# Patient Record
Sex: MALE | Race: OTHER | HISPANIC OR LATINO | URBAN - METROPOLITAN AREA
[De-identification: names, ages, dates, MRNs, and addresses within clinical notes are randomized per-mention and may not be internally consistent; named-entity substitution may affect disease eponyms.]

---

## 2019-12-20 ENCOUNTER — INPATIENT (INPATIENT)
Facility: HOSPITAL | Age: 69
LOS: 3 days | Discharge: HOME HEALTH | End: 2019-12-24
Attending: INTERNAL MEDICINE | Admitting: INTERNAL MEDICINE
Payer: MEDICARE

## 2019-12-20 VITALS
DIASTOLIC BLOOD PRESSURE: 71 MMHG | SYSTOLIC BLOOD PRESSURE: 121 MMHG | RESPIRATION RATE: 20 BRPM | TEMPERATURE: 98 F | OXYGEN SATURATION: 97 % | HEART RATE: 93 BPM

## 2019-12-20 LAB
ALBUMIN SERPL ELPH-MCNC: 4.1 G/DL — SIGNIFICANT CHANGE UP (ref 3.5–5.2)
ALP SERPL-CCNC: 714 U/L — HIGH (ref 30–115)
ALT FLD-CCNC: 134 U/L — HIGH (ref 0–41)
ANION GAP SERPL CALC-SCNC: 22 MMOL/L — HIGH (ref 7–14)
APPEARANCE UR: CLEAR — SIGNIFICANT CHANGE UP
APTT BLD: 31.1 SEC — SIGNIFICANT CHANGE UP (ref 27–39.2)
AST SERPL-CCNC: 83 U/L — HIGH (ref 0–41)
BACTERIA # UR AUTO: NEGATIVE — SIGNIFICANT CHANGE UP
BASE EXCESS BLDV CALC-SCNC: 1.3 MMOL/L — SIGNIFICANT CHANGE UP (ref -2–2)
BILIRUB SERPL-MCNC: 7 MG/DL — HIGH (ref 0.2–1.2)
BILIRUB UR-MCNC: ABNORMAL
BUN SERPL-MCNC: 92 MG/DL — CRITICAL HIGH (ref 10–20)
CA-I SERPL-SCNC: 1.05 MMOL/L — LOW (ref 1.12–1.3)
CALCIUM SERPL-MCNC: 8.8 MG/DL — SIGNIFICANT CHANGE UP (ref 8.5–10.1)
CHLORIDE SERPL-SCNC: 84 MMOL/L — LOW (ref 98–110)
CO2 SERPL-SCNC: 24 MMOL/L — SIGNIFICANT CHANGE UP (ref 17–32)
COLOR SPEC: ABNORMAL
CREAT SERPL-MCNC: 9.5 MG/DL — CRITICAL HIGH (ref 0.7–1.5)
DIFF PNL FLD: SIGNIFICANT CHANGE UP
EPI CELLS # UR: 2 /HPF — SIGNIFICANT CHANGE UP (ref 0–5)
FLU A RESULT: NEGATIVE — SIGNIFICANT CHANGE UP
FLU A RESULT: NEGATIVE — SIGNIFICANT CHANGE UP
FLUAV AG NPH QL: NEGATIVE — SIGNIFICANT CHANGE UP
FLUBV AG NPH QL: NEGATIVE — SIGNIFICANT CHANGE UP
GAS PNL BLDV: 130 MMOL/L — LOW (ref 136–145)
GAS PNL BLDV: SIGNIFICANT CHANGE UP
GLUCOSE SERPL-MCNC: 125 MG/DL — HIGH (ref 70–99)
GLUCOSE UR QL: NEGATIVE — SIGNIFICANT CHANGE UP
HCO3 BLDV-SCNC: 28 MMOL/L — SIGNIFICANT CHANGE UP (ref 22–29)
HCT VFR BLD CALC: 33.9 % — LOW (ref 42–52)
HCT VFR BLDA CALC: 36.1 % — SIGNIFICANT CHANGE UP (ref 34–44)
HGB BLD CALC-MCNC: 11.8 G/DL — LOW (ref 14–18)
HGB BLD-MCNC: 10.9 G/DL — LOW (ref 14–18)
HYALINE CASTS # UR AUTO: 3 /LPF — SIGNIFICANT CHANGE UP (ref 0–7)
INR BLD: 1.46 RATIO — HIGH (ref 0.65–1.3)
KETONES UR-MCNC: NEGATIVE — SIGNIFICANT CHANGE UP
LACTATE BLDV-MCNC: 1.9 MMOL/L — HIGH (ref 0.5–1.6)
LEUKOCYTE ESTERASE UR-ACNC: ABNORMAL
MAGNESIUM SERPL-MCNC: 2.3 MG/DL — SIGNIFICANT CHANGE UP (ref 1.8–2.4)
MCHC RBC-ENTMCNC: 28 PG — SIGNIFICANT CHANGE UP (ref 27–31)
MCHC RBC-ENTMCNC: 32.2 G/DL — SIGNIFICANT CHANGE UP (ref 32–37)
MCV RBC AUTO: 87.1 FL — SIGNIFICANT CHANGE UP (ref 80–94)
NITRITE UR-MCNC: NEGATIVE — SIGNIFICANT CHANGE UP
NRBC # BLD: 0 /100 WBCS — SIGNIFICANT CHANGE UP (ref 0–0)
NT-PROBNP SERPL-SCNC: HIGH PG/ML (ref 0–300)
PCO2 BLDV: 50 MMHG — SIGNIFICANT CHANGE UP (ref 41–51)
PH BLDV: 7.35 — SIGNIFICANT CHANGE UP (ref 7.26–7.43)
PH UR: 5.5 — SIGNIFICANT CHANGE UP (ref 5–8)
PLATELET # BLD AUTO: 127 K/UL — LOW (ref 130–400)
PO2 BLDV: 38 MMHG — SIGNIFICANT CHANGE UP (ref 20–40)
POTASSIUM BLDV-SCNC: 5.4 MMOL/L — SIGNIFICANT CHANGE UP (ref 3.3–5.6)
POTASSIUM SERPL-MCNC: 5.4 MMOL/L — HIGH (ref 3.5–5)
POTASSIUM SERPL-SCNC: 5.4 MMOL/L — HIGH (ref 3.5–5)
PROT SERPL-MCNC: 7.4 G/DL — SIGNIFICANT CHANGE UP (ref 6–8)
PROT UR-MCNC: ABNORMAL
PROTHROM AB SERPL-ACNC: 16.7 SEC — HIGH (ref 9.95–12.87)
RBC # BLD: 3.89 M/UL — LOW (ref 4.7–6.1)
RBC # FLD: 16.3 % — HIGH (ref 11.5–14.5)
RBC CASTS # UR COMP ASSIST: 4 /HPF — SIGNIFICANT CHANGE UP (ref 0–4)
RSV RESULT: NEGATIVE — SIGNIFICANT CHANGE UP
RSV RNA RESP QL NAA+PROBE: NEGATIVE — SIGNIFICANT CHANGE UP
SAO2 % BLDV: 63 % — SIGNIFICANT CHANGE UP
SODIUM SERPL-SCNC: 130 MMOL/L — LOW (ref 135–146)
SP GR SPEC: 1.02 — SIGNIFICANT CHANGE UP (ref 1.01–1.02)
TROPONIN T SERPL-MCNC: 0.14 NG/ML — CRITICAL HIGH
UROBILINOGEN FLD QL: ABNORMAL
WBC # BLD: 7.57 K/UL — SIGNIFICANT CHANGE UP (ref 4.8–10.8)
WBC # FLD AUTO: 7.57 K/UL — SIGNIFICANT CHANGE UP (ref 4.8–10.8)
WBC UR QL: 16 /HPF — HIGH (ref 0–5)

## 2019-12-20 PROCEDURE — 99285 EMERGENCY DEPT VISIT HI MDM: CPT

## 2019-12-20 PROCEDURE — 71045 X-RAY EXAM CHEST 1 VIEW: CPT | Mod: 26

## 2019-12-20 PROCEDURE — 93010 ELECTROCARDIOGRAM REPORT: CPT

## 2019-12-20 RX ORDER — MEROPENEM 1 G/30ML
500 INJECTION INTRAVENOUS ONCE
Refills: 0 | Status: COMPLETED | OUTPATIENT
Start: 2019-12-20 | End: 2019-12-20

## 2019-12-20 RX ORDER — FUROSEMIDE 40 MG
40 TABLET ORAL ONCE
Refills: 0 | Status: DISCONTINUED | OUTPATIENT
Start: 2019-12-20 | End: 2019-12-20

## 2019-12-20 RX ORDER — ASPIRIN/CALCIUM CARB/MAGNESIUM 324 MG
324 TABLET ORAL ONCE
Refills: 0 | Status: DISCONTINUED | OUTPATIENT
Start: 2019-12-20 | End: 2019-12-20

## 2019-12-20 NOTE — ED PROVIDER NOTE - PROGRESS NOTE DETAILS
Hyperkalemia - 5.4 without acute EKG changes. patient can wait till Sat for HD. Noted elevated LFTs and jaundice. CT ordered to evaluate intraabdominal pathology. patient also had low grade temp on arrival 99.7 so abx was also initiated.

## 2019-12-20 NOTE — ED PROVIDER NOTE - OBJECTIVE STATEMENT
68 yo male hx of HTN/A.fib/ DM (controlled)/ ESRD on HD (Tue, Thur, Sat last HD Tue) / ? rheumatic heart disease s/p Aortic and Mitral valve replacement present c/o fever and generalized weakness today. as per patient, he had abdominal pain and nausea yesterday so he didn't go to his HD yesterday. Started feeling fever/chill and weakness so he went to outside urgent who sent him to ED for evaluation. daughter also reported patient has been having worsening legs swelling over the past few months and sometimes MERCADO. patient denies chest pain/abd pain/n/v/d and urinary sxs (made small amount of urine daily).

## 2019-12-20 NOTE — ED ADULT TRIAGE NOTE - CHIEF COMPLAINT QUOTE
Patient BIBEMS from urgent care. Patient complains of weakness/fatigue x 2 weeks. Patient skipped dialysis yesterday due to weakness. Patient felt SOB at urgent care. Patient on 2L O2 PTA.

## 2019-12-20 NOTE — ED PROVIDER NOTE - CARE PLAN
Principal Discharge DX:	Elevated LFTs  Secondary Diagnosis:	Elevated troponin  Secondary Diagnosis:	Weakness  Secondary Diagnosis:	ESRD on hemodialysis  Secondary Diagnosis:	Hyperkalemia

## 2019-12-20 NOTE — ED PROVIDER NOTE - PSH
AV fistula  right upper arm  H/O aortic valve replacement    H/O cardiac pacemaker    H/O mitral valve replacement

## 2019-12-20 NOTE — ED PROVIDER NOTE - PHYSICAL EXAMINATION
CONSTITUTIONAL: Well-appearing; well-nourished; in no apparent distress.   EYES: PERRL; EOM intact. icterus sclera   ENT: normal nose; no rhinorrhea; normal pharynx with no tonsillar hypertrophy.   NECK: Supple; non-tender;. No JVD.   CARDIOVASCULAR: Normal S1, S2; no murmurs, rubs, or gallops.   RESPIRATORY: Normal chest excursion with respiration; breath sounds clear and equal bilaterally; no wheezes, rhonchi, or rales.  GI/: obese round and soft abdomen. Normal bowel sounds; non-tender; no palpable organomegaly.   MS: No evidence of trauma or deformity to extremities. + thrill to right upper arm AV fistula  SKIN: Normal for age and race; warm; dry; good turgor; no apparent lesions or exudate. + jaundice  NEURO/PSYCH: A & O x 4; grossly unremarkable. CONSTITUTIONAL: Well-appearing; well-nourished; in no apparent distress.   EYES: PERRL; EOM intact. icterus sclera   ENT: normal nose; no rhinorrhea; normal pharynx with no tonsillar hypertrophy.   NECK: Supple; non-tender;. No JVD.   CARDIOVASCULAR: Normal S1, S2; no murmurs, rubs, or gallops.   RESPIRATORY: Normal chest excursion with respiration; breath sounds clear and equal bilaterally; no wheezes, rhonchi, or rales.  GI/: obese round and soft abdomen. Normal bowel sounds; non-tender; no palpable organomegaly.   MS: No evidence of trauma or deformity to extremities. + thrill to right upper arm AV fistula 1+ pitting edema to b/l LE   SKIN: Normal for age and race; warm; dry; good turgor; no apparent lesions or exudate. + jaundice  NEURO/PSYCH: A & O x 4; grossly unremarkable.

## 2019-12-20 NOTE — ED PROVIDER NOTE - ATTENDING CONTRIBUTION TO CARE
I personally evaluated the patient. I reviewed the Resident’s or Physician Assistant’s note (as assigned above), and agree with the findings and plan except as documented in my note.    Pt is a 68 y/o male with hx of HTN, DM, afib, ESRD on HD (T/T/Sa, skipped one session), presents to ED for fever and weakness for 2 days, moderate, constant, worse since onset. + mild abd pain, nausea. No cough, chest pain.    Constitutional: Well developed, well nourished. NAD.  Head: Normocephalic, atraumatic.  Eyes: PERRL. EOMI.  ENT: No nasal discharge. Mucous membranes moist.  Neck: Supple. Painless ROM.  Cardiovascular: Normal S1, S2. Irregularly irregular.  Pulmonary: Normal respiratory rate and effort. Lungs clear to auscultation bilaterally. No wheezing, rales, or rhonchi.  Abdominal: Soft. Nondistended. Nontender. No rebound, guarding, rigidity.  Extremities. Pelvis stable. No lower extremity edema, symmetric calves.  Skin: No rashes, cyanosis. Mild jaundice.  Neuro: AAOx3. No focal neurological deficits.  Psych: Normal mood. Normal affect.

## 2019-12-20 NOTE — ED PROVIDER NOTE - NS ED ROS FT
Constitutional: + fever, chills and weakness, no recent weight loss, + decreased appetite  Eyes: no redness/discharge/pain/vision changes  ENT: no rhinorrhea/ear pain/sore throat  Cardiac: No chest pain, SOB or edema.  Respiratory: No cough or respiratory distress  GI: see HPI  : No dysuria, frequency, urgency or hematuria  MS: no pain to back or extremities, no loss of ROM, no weakness  Neuro: No headache or weakness. No LOC.  Skin: No skin rash.  Endocrine: No history of thyroid disease or diabetes.  Except as documented in the HPI, all other systems are negative.

## 2019-12-20 NOTE — ED PROVIDER NOTE - CLINICAL SUMMARY MEDICAL DECISION MAKING FREE TEXT BOX
Pt with multiple medical problems presented to ED for weakness, found to have elevated LFTs and bili, elevated troponin, hyperkalemia without EKG changes. Pt admitted to medicine for further eval.

## 2019-12-21 DIAGNOSIS — I77.0 ARTERIOVENOUS FISTULA, ACQUIRED: Chronic | ICD-10-CM

## 2019-12-21 DIAGNOSIS — Z95.2 PRESENCE OF PROSTHETIC HEART VALVE: Chronic | ICD-10-CM

## 2019-12-21 DIAGNOSIS — Z95.0 PRESENCE OF CARDIAC PACEMAKER: Chronic | ICD-10-CM

## 2019-12-21 LAB
-  K. PNEUMONIAE GROUP: SIGNIFICANT CHANGE UP
ALBUMIN SERPL ELPH-MCNC: 3.9 G/DL — SIGNIFICANT CHANGE UP (ref 3.5–5.2)
ALBUMIN SERPL ELPH-MCNC: 4 G/DL — SIGNIFICANT CHANGE UP (ref 3.5–5.2)
ALP SERPL-CCNC: 673 U/L — HIGH (ref 30–115)
ALP SERPL-CCNC: 708 U/L — HIGH (ref 30–115)
ALT FLD-CCNC: 105 U/L — HIGH (ref 0–41)
ALT FLD-CCNC: 106 U/L — HIGH (ref 0–41)
ANION GAP SERPL CALC-SCNC: 22 MMOL/L — HIGH (ref 7–14)
APTT BLD: 30.7 SEC — SIGNIFICANT CHANGE UP (ref 27–39.2)
AST SERPL-CCNC: 58 U/L — HIGH (ref 0–41)
AST SERPL-CCNC: 59 U/L — HIGH (ref 0–41)
BILIRUB DIRECT SERPL-MCNC: 5.4 MG/DL — HIGH (ref 0–0.2)
BILIRUB INDIRECT FLD-MCNC: 0.6 MG/DL — SIGNIFICANT CHANGE UP (ref 0.2–1.2)
BILIRUB SERPL-MCNC: 6 MG/DL — HIGH (ref 0.2–1.2)
BILIRUB SERPL-MCNC: 6.2 MG/DL — HIGH (ref 0.2–1.2)
BUN SERPL-MCNC: 97 MG/DL — CRITICAL HIGH (ref 10–20)
CALCIUM SERPL-MCNC: 8.9 MG/DL — SIGNIFICANT CHANGE UP (ref 8.5–10.1)
CERULOPLASMIN SERPL-MCNC: 40 MG/DL — HIGH (ref 15–30)
CHLORIDE SERPL-SCNC: 85 MMOL/L — LOW (ref 98–110)
CO2 SERPL-SCNC: 24 MMOL/L — SIGNIFICANT CHANGE UP (ref 17–32)
CREAT SERPL-MCNC: 10.1 MG/DL — CRITICAL HIGH (ref 0.7–1.5)
GLUCOSE BLDC GLUCOMTR-MCNC: 123 MG/DL — HIGH (ref 70–99)
GLUCOSE BLDC GLUCOMTR-MCNC: 128 MG/DL — HIGH (ref 70–99)
GLUCOSE SERPL-MCNC: 91 MG/DL — SIGNIFICANT CHANGE UP (ref 70–99)
GRAM STN FLD: SIGNIFICANT CHANGE UP
GRAM STN FLD: SIGNIFICANT CHANGE UP
HAV IGM SER-ACNC: SIGNIFICANT CHANGE UP
HBV CORE IGM SER-ACNC: SIGNIFICANT CHANGE UP
HBV SURFACE AG SER-ACNC: SIGNIFICANT CHANGE UP
HCT VFR BLD CALC: 33.1 % — LOW (ref 42–52)
HCV AB S/CO SERPL IA: 0.24 S/CO — SIGNIFICANT CHANGE UP (ref 0–0.99)
HCV AB S/CO SERPL IA: 0.25 S/CO — SIGNIFICANT CHANGE UP (ref 0–0.99)
HCV AB SERPL-IMP: SIGNIFICANT CHANGE UP
HCV AB SERPL-IMP: SIGNIFICANT CHANGE UP
HGB BLD-MCNC: 10.5 G/DL — LOW (ref 14–18)
INR BLD: 1.4 RATIO — HIGH (ref 0.65–1.3)
MAGNESIUM SERPL-MCNC: 2.3 MG/DL — SIGNIFICANT CHANGE UP (ref 1.8–2.4)
MCHC RBC-ENTMCNC: 27.8 PG — SIGNIFICANT CHANGE UP (ref 27–31)
MCHC RBC-ENTMCNC: 31.7 G/DL — LOW (ref 32–37)
MCV RBC AUTO: 87.6 FL — SIGNIFICANT CHANGE UP (ref 80–94)
METHOD TYPE: SIGNIFICANT CHANGE UP
NRBC # BLD: 0 /100 WBCS — SIGNIFICANT CHANGE UP (ref 0–0)
PLATELET # BLD AUTO: 130 K/UL — SIGNIFICANT CHANGE UP (ref 130–400)
POTASSIUM SERPL-MCNC: 5 MMOL/L — SIGNIFICANT CHANGE UP (ref 3.5–5)
POTASSIUM SERPL-SCNC: 5 MMOL/L — SIGNIFICANT CHANGE UP (ref 3.5–5)
PROT SERPL-MCNC: 7.1 G/DL — SIGNIFICANT CHANGE UP (ref 6–8)
PROT SERPL-MCNC: 7.1 G/DL — SIGNIFICANT CHANGE UP (ref 6–8)
PROTHROM AB SERPL-ACNC: 16 SEC — HIGH (ref 9.95–12.87)
RBC # BLD: 3.78 M/UL — LOW (ref 4.7–6.1)
RBC # FLD: 16.3 % — HIGH (ref 11.5–14.5)
SODIUM SERPL-SCNC: 131 MMOL/L — LOW (ref 135–146)
SPECIMEN SOURCE: SIGNIFICANT CHANGE UP
SPECIMEN SOURCE: SIGNIFICANT CHANGE UP
WBC # BLD: 7.01 K/UL — SIGNIFICANT CHANGE UP (ref 4.8–10.8)
WBC # FLD AUTO: 7.01 K/UL — SIGNIFICANT CHANGE UP (ref 4.8–10.8)

## 2019-12-21 PROCEDURE — 76705 ECHO EXAM OF ABDOMEN: CPT | Mod: 26

## 2019-12-21 PROCEDURE — 99222 1ST HOSP IP/OBS MODERATE 55: CPT

## 2019-12-21 PROCEDURE — 93306 TTE W/DOPPLER COMPLETE: CPT | Mod: 26

## 2019-12-21 PROCEDURE — 74177 CT ABD & PELVIS W/CONTRAST: CPT | Mod: 26

## 2019-12-21 RX ORDER — PANTOPRAZOLE SODIUM 20 MG/1
40 TABLET, DELAYED RELEASE ORAL
Refills: 0 | Status: DISCONTINUED | OUTPATIENT
Start: 2019-12-21 | End: 2019-12-24

## 2019-12-21 RX ORDER — INFLUENZA VIRUS VACCINE 15; 15; 15; 15 UG/.5ML; UG/.5ML; UG/.5ML; UG/.5ML
0.5 SUSPENSION INTRAMUSCULAR ONCE
Refills: 0 | Status: COMPLETED | OUTPATIENT
Start: 2019-12-21 | End: 2019-12-21

## 2019-12-21 RX ORDER — ACETAMINOPHEN 500 MG
325 TABLET ORAL ONCE
Refills: 0 | Status: COMPLETED | OUTPATIENT
Start: 2019-12-21 | End: 2019-12-21

## 2019-12-21 RX ORDER — CEFTRIAXONE 500 MG/1
1000 INJECTION, POWDER, FOR SOLUTION INTRAMUSCULAR; INTRAVENOUS EVERY 24 HOURS
Refills: 0 | Status: DISCONTINUED | OUTPATIENT
Start: 2019-12-21 | End: 2019-12-21

## 2019-12-21 RX ORDER — HEPARIN SODIUM 5000 [USP'U]/ML
5000 INJECTION INTRAVENOUS; SUBCUTANEOUS EVERY 12 HOURS
Refills: 0 | Status: DISCONTINUED | OUTPATIENT
Start: 2019-12-21 | End: 2019-12-24

## 2019-12-21 RX ORDER — FINASTERIDE 5 MG/1
5 TABLET, FILM COATED ORAL DAILY
Refills: 0 | Status: DISCONTINUED | OUTPATIENT
Start: 2019-12-21 | End: 2019-12-24

## 2019-12-21 RX ORDER — ASPIRIN/CALCIUM CARB/MAGNESIUM 324 MG
324 TABLET ORAL ONCE
Refills: 0 | Status: COMPLETED | OUTPATIENT
Start: 2019-12-21 | End: 2019-12-21

## 2019-12-21 RX ORDER — ATORVASTATIN CALCIUM 80 MG/1
20 TABLET, FILM COATED ORAL AT BEDTIME
Refills: 0 | Status: DISCONTINUED | OUTPATIENT
Start: 2019-12-21 | End: 2019-12-24

## 2019-12-21 RX ORDER — CALCIUM ACETATE 667 MG
667 TABLET ORAL
Refills: 0 | Status: DISCONTINUED | OUTPATIENT
Start: 2019-12-21 | End: 2019-12-24

## 2019-12-21 RX ORDER — MEROPENEM 1 G/30ML
500 INJECTION INTRAVENOUS EVERY 24 HOURS
Refills: 0 | Status: DISCONTINUED | OUTPATIENT
Start: 2019-12-21 | End: 2019-12-23

## 2019-12-21 RX ADMIN — PANTOPRAZOLE SODIUM 40 MILLIGRAM(S): 20 TABLET, DELAYED RELEASE ORAL at 07:28

## 2019-12-21 RX ADMIN — MEROPENEM 100 MILLIGRAM(S): 1 INJECTION INTRAVENOUS at 17:24

## 2019-12-21 RX ADMIN — ATORVASTATIN CALCIUM 20 MILLIGRAM(S): 80 TABLET, FILM COATED ORAL at 21:47

## 2019-12-21 RX ADMIN — Medication 324 MILLIGRAM(S): at 02:11

## 2019-12-21 RX ADMIN — Medication 667 MILLIGRAM(S): at 12:23

## 2019-12-21 RX ADMIN — Medication 667 MILLIGRAM(S): at 08:20

## 2019-12-21 RX ADMIN — FINASTERIDE 5 MILLIGRAM(S): 5 TABLET, FILM COATED ORAL at 12:23

## 2019-12-21 RX ADMIN — HEPARIN SODIUM 5000 UNIT(S): 5000 INJECTION INTRAVENOUS; SUBCUTANEOUS at 07:28

## 2019-12-21 RX ADMIN — CEFTRIAXONE 100 MILLIGRAM(S): 500 INJECTION, POWDER, FOR SOLUTION INTRAMUSCULAR; INTRAVENOUS at 12:23

## 2019-12-21 RX ADMIN — MEROPENEM 100 MILLIGRAM(S): 1 INJECTION INTRAVENOUS at 02:31

## 2019-12-21 RX ADMIN — Medication 325 MILLIGRAM(S): at 21:47

## 2019-12-21 RX ADMIN — Medication 667 MILLIGRAM(S): at 21:47

## 2019-12-21 NOTE — CONSULT NOTE ADULT - ASSESSMENT
ESRD on HD   - via RUE aneurysmal AVF   - TTS at [facility]   - missed last HD    - last HD on Tuesday  sepsis / fever due to bacteremia  GNR bacteremia   hyponatremia  hyperkalemia  afib not on AC due to GIB  rheumatic heart disease s/p AVR and MVR  DM  MARLI on home O2 and bipap  cholelithiasis  cirrhosis   splenomegaly / ascites  ex-etoh abuse  abnormal LFT's    plan:     HD today via AVF  ID eval  MRCP per GI  right arm precautions   renal dose meropenum  f/u GI  f/u surgery  low K+ renal diet  phoslo with meals  f/u cultures id and sensitivity  f/u urine cultures  d/w HD nursing

## 2019-12-21 NOTE — ED ADULT NURSE NOTE - CHPI ED NUR SYMPTOMS NEG
no decreased eating/drinking/no vomiting/no weakness/no chills/no pain/no tingling/no dizziness/no nausea

## 2019-12-21 NOTE — H&P ADULT - NSICDXPASTSURGICALHX_GEN_ALL_CORE_FT
PAST SURGICAL HISTORY:  AV fistula right upper arm    H/O aortic valve replacement     H/O cardiac pacemaker     H/O mitral valve replacement

## 2019-12-21 NOTE — H&P ADULT - HISTORY OF PRESENT ILLNESS
68 yo male hx of HTN/A.fib/ DM (controlled)/ ESRD on HD (Tue, Thur, Sat last HD Tue) / ? rheumatic heart disease s/p Aortic and Mitral valve replacement present c/o fever and generalized weakness today. as per patient, he had abdominal pain and nausea yesterday so he didn't go to his HD yesterday. Started feeling fever/chill and weakness so he went to outside urgent who sent him to ED for evaluation. daughter also reported patient has been having worsening legs swelling over the past few months and sometimes MERCADO. patient denies chest pain/abd pain/n/v/d and urinary sxs (made small amount of urine daily) Patient is a 70 yo Yakut speaking male with PMH of Hypertension, ESRD on HD T/Th/S, Atrial fibrillation not on AC due to GI bleed, DM, MARLI on O2 3l and BiPAP at night, rheumatic heart disease s/p Aortic and Mitral valve replacement present with chief complaint of fever and generalized weakness for 2 days. He was having abdominal pain in right upper quadrant region, intermittent, associated with severe nausea yesterday. Also c/o subjective fevers at home. He didn't go to his HD yesterday because of the symptoms. Patient also has shortness of breath on exertion at baseline and worsening lower extremity edema in the last few weeks. No c/o vomiting, diarrhea, constipation, hematemesis, hematochezia. No recent weight loss. No history of gall stones, no family history of malignancy. Patient was a smoker since age of 11, 1.5pc/day and stopped 4yrs ago. He was also a chronic alcoholic, stopped in 2015 after his valve replacement surgery. He had EGD and colonoscopy in 2015 due to GI bleeding which as per the daughter was normal. Patient was never diagnosed with Cirrhosis in the past.  In ED, BP  , HR  , CT abdomen showed Patient is a 68 yo Yoruba speaking male with PMH of Hypertension, ESRD on HD T/Th/S, Atrial fibrillation not on AC due to GI bleed, DM, MARLI on O2 3l and BiPAP at night, rheumatic heart disease s/p Aortic and Mitral valve replacement present with chief complaint of fever and generalized weakness for 2 days. He was having abdominal pain in right upper quadrant region, intermittent, associated with severe nausea yesterday. Also c/o subjective fevers at home. He didn't go to his HD yesterday because of the symptoms. Patient also has shortness of breath on exertion at baseline and worsening lower extremity edema in the last few weeks. No c/o vomiting, diarrhea, constipation, hematemesis, hematochezia. No recent weight loss. No history of gall stones, no family history of malignancy. Patient was a smoker since age of 11, 1.5pc/day and stopped 4yrs ago. He was also a chronic alcoholic, stopped in 2015 after his valve replacement surgery. He had EGD and colonoscopy in 2015 due to GI bleeding which as per the daughter was normal. Patient was never diagnosed with Cirrhosis in the past.  In ED, /68 , HR 86, Platelet 127, bilirubin 7, alkaline phosphatase 714, AST 83, , troponin 0.14, BNP 75031. CT abdomen showed Slightly nodular hepatic contour with associated splenomegaly and small volume abdominal pelvic ascites. US abdomen showed cholelithiasis with contracted gallbladder wall thickening, nodular liver.

## 2019-12-21 NOTE — H&P ADULT - ATTENDING COMMENTS
Patient seen and examined at bedside, agree with above. Volume overload: HD ASAP (missed HD due to abdominal pain). RUQ abdominal pain with elevated LFTs: Cirrhosis on CT scan with contracted gallbladder/wall thickening/cholelithiasis: GI consult/Surgical consult. Prognosis guarded

## 2019-12-21 NOTE — ED ADULT NURSE NOTE - OBJECTIVE STATEMENT
Ill appearing Cypriot speaking male accompanied by daughter presents today with complaints of weakness, fatigue, fever, shortness of breath , missed dialysis yesterday, alert and oriented x 4, respirations are even and unlabored, normal sinus rhythm with PVCs noted, abdomen is obese , soft, nontender, +2 pitting edema noted on bilateral lower extremities, right AV fistula with dialysis  on Tuesday, Thursday, Saturday, 20 gauge saline lock placed on left AC, Blood drawn and sent. Will follow up. Ill appearing, jaundiced,  Tur[place] Islander speaking male accompanied by daughter presents today with complaints of weakness, fatigue, fever, shortness of breath ,  right upper quadrant pain, missed dialysis yesterday, alert and oriented x 4, respirations are even and unlabored, normal sinus rhythm with PVCs noted, abdomen is obese , soft, nontender, +2 pitting edema noted on bilateral lower extremities, right AV fistula with dialysis  on Tuesday, Thursday, Saturday, 20 gauge saline lock placed on left AC, Blood drawn and sent. Will follow up. Ill appearing, jaundiced,  South African speaking male accompanied by daughter presents today with complaints of weakness, fatigue, fever, shortness of breath ,  right upper quadrant pain, missed dialysis yesterday, alert and oriented x 4, respirations are even and unlabored, paced rhythm, abdomen is obese , firm, nontender, +2 pitting edema noted on bilateral lower extremities, right AV fistula with dialysis  on Tuesday, Thursday, Saturday, 20 gauge saline lock placed on left AC, Blood drawn and sent. Will follow up.

## 2019-12-21 NOTE — CHART NOTE - NSCHARTNOTEFT_GEN_A_CORE
Examined the patient at bedside. Used [employer]  [id number] Tab Bangladeshi.   Patient came in for fevers, RUQ pain worse but generalized abd pain, nausea w/o vomiting. Doesn't no how much fever he has had. has SOB but is chronic from cardiac sugery a few years back. No longer drinking (quit 2015). Makes urine, denies hematuria/dysuria. Otherwise no cough, CP. Missed HD once because of malaise and not feeling well.     Doesn't think he was ever told anything wrong with his liver. Sees a GI for 'stomach problems'.   PROVIDERS: EP Dr. Donovan [phone], Cardio Dr. Walters [phone], Heart Surgeon Dr. Childress [phone], Pulm Dr. Kenrick Cox [phone], GI Dr. Davian Cox [phone]. Doesn't know his CPAP #. Hasn't seen GI in a 'while'. Most of these doctors are in [facility].     Physical exam showed morbidly obese M on O2, heart regular but body habitus made it difficult to appreciate any murmurs. Lung sounded clear to auscultation, no abd pain at this time. Pt sitting upright, abd exam limited but no RUQ tenderness noted on this exam. Mild oral/scleral jaundice. Dark urine noted at bedside. 1-2+ pitting edema of legs to knees noted.    PLAN:   - Echo  - ceftriaxone given +UA  - Nephro - HD given missed session  - consulted surgery (called in)  - consulted GI (worsening LFT and high BR)

## 2019-12-21 NOTE — CONSULT NOTE ADULT - SUBJECTIVE AND OBJECTIVE BOX
NEPHROLOGY CONSULTATION NOTE    Patient is a 69y Male whom presented to the hospital with     PAST MEDICAL & SURGICAL HISTORY:  ESRD (end stage renal disease) on dialysis  Diabetes mellitus  HTN (hypertension)  AV fistula: right upper arm  H/O cardiac pacemaker  H/O mitral valve replacement  H/O aortic valve replacement    Allergies:  No Known Allergies    Home Medications Reviewed    SOCIAL HISTORY:  Denies ETOH,Smoking,   FAMILY HISTORY:        REVIEW OF SYSTEMS:  CONSTITUTIONAL: No weakness, fevers or chills  EYES/ENT: No visual changes;  No vertigo or throat pain   NECK: No pain or stiffness  RESPIRATORY: No cough, wheezing, hemoptysis; No shortness of breath  CARDIOVASCULAR: No chest pain or palpitations.  GASTROINTESTINAL: No abdominal or epigastric pain. No nausea, vomiting, or hematemesis; No diarrhea or constipation. No melena or hematochezia.  GENITOURINARY: No dysuria, frequency, foamy urine, urinary urgency, incontinence or hematuria  NEUROLOGICAL: No numbness or weakness  SKIN: No itching, burning, rashes, or lesions   VASCULAR: No bilateral lower extremity edema.   All other review of systems is negative unless indicated above.    PHYSICAL EXAM:  Constitutional: NAD  HEENT: anicteric sclera, oropharynx clear, MMM  Neck: No JVD  Respiratory: CTAB, no wheezes, rales or rhonchi  Cardiovascular: S1, S2, RRR  Gastrointestinal: BS+, soft, NT/ND  Extremities: No cyanosis or clubbing. No peripheral edema  Neurological: A/O x 3, no focal deficits  Psychiatric: Normal mood, normal affect  : No CVA tenderness. No wolf.   Skin: No rashes  Vascular Access: NEPHROLOGY CONSULTATION NOTE    70 yo male with PMH of Hypertension, ESRD on HD T//S, Atrial fibrillation not on AC due to GI bleed, DM, MARLI on O2 3l and BiPAP at night, rheumatic heart disease s/p Aortic and Mitral valve replacement present with chief complaint of fever and generalized weakness for 2 days. He was having abdominal pain in right upper quadrant region, intermittent, associated with severe nausea yesterday. Also c/o subjective fevers at home. He didn't go to his HD yesterday because of the symptoms. Patient also has shortness of breath on exertion at baseline and worsening lower extremity edema in the last few weeks. No c/o vomiting, diarrhea, constipation, hematemesis, hematochezia. No recent weight loss. No history of gall stones, no family history of malignancy. Patient was a smoker since age of 11, 1.5pc/day and stopped 4yrs ago. He was also a chronic alcoholic, stopped in  after his valve replacement surgery. He had EGD and colonoscopy in 2015 due to GI bleeding which as per the daughter was normal. Patient was never diagnosed with Cirrhosis in the past.  In ED, /68 , HR 86, Platelet 127, bilirubin 7, alkaline phosphatase 714, AST 83, , troponin 0.14, BNP 91433. CT abdomen showed Slightly nodular hepatic contour with associated splenomegaly and small volume abdominal pelvic ascites. US abdomen showed cholelithiasis with contracted gallbladder wall thickening, nodular liver.      PAST MEDICAL & SURGICAL HISTORY:  ESRD (end stage renal disease) on dialysis  Diabetes mellitus  HTN (hypertension)  AV fistula: right upper arm  H/O cardiac pacemaker  H/O mitral valve replacement  H/O aortic valve replacement    Allergies:  No Known Allergies    Home Medications Reviewed    SOCIAL HISTORY:  no current smoking, drugs or etoh (ex- etoh abuse and ex-smoker)  lives home     FAMILY HISTORY:  no pertinent family history in relatives per patient      REVIEW OF SYSTEMS:  CONSTITUTIONAL: No weakness,  + fevers or chills  EYES/ENT: No visual changes;  No vertigo or throat pain   NECK: No pain or stiffness  RESPIRATORY: No cough, wheezing, hemoptysis; No shortness of breath  CARDIOVASCULAR: No chest pain or palpitations.  GASTROINTESTINAL: No abdominal or epigastric pain. No nausea, vomiting, or hematemesis; No diarrhea or constipation. No melena or hematochezia. + abd distention  GENITOURINARY: No dysuria, frequency, foamy urine, urinary urgency, incontinence or hematuria  NEUROLOGICAL: No numbness or weakness  SKIN: No itching, burning, rashes, or lesions   VASCULAR: No bilateral lower extremity edema.   All other review of systems is negative unless indicated above.    PHYSICAL EXAM:  Constitutional: NAD  HEENT: anicteric sclera, oropharynx clear, MMM  Neck: No JVD  Respiratory: decreased bs b/l, no wheezes, rales or rhonchi  Cardiovascular: S1, S2, irreg  Gastrointestinal: BS+, soft, NT/ + abd distention  Extremities: No cyanosis or clubbing. + peripheral edema  Neurological: A/O x 3, no focal deficits  Psychiatric: Normal mood, normal affect  : No CVA tenderness. No wolf.   Skin: No rashes  Vascular Access: [facility] Medications:   MEDICATIONS  (STANDING):  atorvastatin 20 milliGRAM(s) Oral at bedtime  calcium acetate 667 milliGRAM(s) Oral four times a day with meals  finasteride 5 milliGRAM(s) Oral daily  heparin  Injectable 5000 Unit(s) SubCutaneous every 12 hours  meropenem  IVPB 500 milliGRAM(s) IV Intermittent every 24 hours  pantoprazole    Tablet 40 milliGRAM(s) Oral before breakfast        VITALS:  T(F): 97.7 (19 @ 07:33), Max: 99.7 (19 @ 21:05)  HR: 84 (19 @ 07:33)  BP: 113/72 (19 @ 07:33)  RR: 16 (19 @ 07:33)  SpO2: 100% (19 @ 07:33)  Wt(kg): --        LABS:      131<L>  |  85<L>  |  97<HH>  ----------------------------<  91  5.0   |  24  |  10.1<HH>    Ca    8.9      21 Dec 2019 05:48  Mg     2.3         TPro  7.1  /  Alb  4.0  /  TBili  6.2<H>  /  DBili  5.4<H>  /  AST  59<H>  /  ALT  105<H>  /  AlkPhos  673<H>                            10.5   7.01  )-----------( 130      ( 21 Dec 2019 05:48 )             33.1       Urine Studies:  Urinalysis Basic - ( 20 Dec 2019 22:55 )    Color: Amy / Appearance: Clear / S.016 / pH:   Gluc:  / Ketone: Negative  / Bili: Small / Urobili: 3 mg/dL   Blood:  / Protein: 30 mg/dL / Nitrite: Negative   Leuk Esterase: Moderate / RBC: 4 /HPF / WBC 16 /HPF   Sq Epi:  / Non Sq Epi: 2 /HPF / Bacteria: Negative          RADIOLOGY & ADDITIONAL STUDIES:    abg note  blood cultures: GNR bacteremia  CT abd - reviewed  abd sono - noted  flu - neg

## 2019-12-21 NOTE — H&P ADULT - NSHPLABSRESULTS_GEN_ALL_CORE
Complete Blood Count (12.20.19 @ 21:00)    Nucleated RBC: 0 /100 WBCs    WBC Count: 7.57 K/uL    RBC Count: 3.89 M/uL    Hemoglobin: 10.9 g/dL    Hematocrit: 33.9 %    Mean Cell Volume: 87.1 fL    Mean Cell Hemoglobin: 28.0 pg    Mean Cell Hemoglobin Conc: 32.2 g/dL    Red Cell Distrib Width: 16.3 %    Platelet Count - Automated: 127 K/uL    Comprehensive Metabolic Panel (12.20.19 @ 21:00)    Sodium, Serum: 130 mmol/L    Potassium, Serum: 5.4 mmol/L    Chloride, Serum: 84 mmol/L    Carbon Dioxide, Serum: 24 mmol/L    Anion Gap, Serum: 22 mmol/L    Blood Urea Nitrogen, Serum: 92: Critical value:  called to/read back by jamison tobar @ 9:40pm 12/20/19 mg/dL    Creatinine, Serum: 9.5: Icteric. Interpret with caution  Critical value:  called to/read back by jamison tobar @ 9:40pm 12/20/19 mg/dL    Glucose, Serum: 125 mg/dL    Calcium, Total Serum: 8.8 mg/dL    Protein Total, Serum: 7.4 g/dL    Albumin, Serum: 4.1 g/dL    Bilirubin Total, Serum: 7.0 mg/dL    Alkaline Phosphatase, Serum: 714 U/L    Aspartate Aminotransferase (AST/SGOT): 83 U/L    Alanine Aminotransferase (ALT/SGPT): 134 U/L    eGFR if Non : 5: Interpretative comment  The units for eGFR are mL/min/1.73M2 (normalized body surface area). The  eGFR is calculated from a serum creatinine using the CKD-EPI equation.  Other variables required for calculation are race, age and sex. Among  patients with chronic kidney disease (CKD), the eGFR is useful in  determining the stage of disease according to KDOQI CKD classification.  All eGFR results are reported numerically with the following  interpretation.          < from: CT Abdomen and Pelvis w/ IV Cont (12.21.19 @ 01:15) >    IMPRESSION:    Slightly nodular hepatic contour. Given the associated splenomegaly and small volume abdominal pelvic ascites, cirrhosis and portal hypertension is favored.    Cholelithiasis.    Indeterminate 2 cm right upper pole hyperdensity. Given partially imaged pacer wires, recommend nonemergent renal protocol CT for further evaluation.      < end of copied text > Complete Blood Count (12.20.19 @ 21:00)    Nucleated RBC: 0 /100 WBCs    WBC Count: 7.57 K/uL    RBC Count: 3.89 M/uL    Hemoglobin: 10.9 g/dL    Hematocrit: 33.9 %    Mean Cell Volume: 87.1 fL    Mean Cell Hemoglobin: 28.0 pg    Mean Cell Hemoglobin Conc: 32.2 g/dL    Red Cell Distrib Width: 16.3 %    Platelet Count - Automated: 127 K/uL    Comprehensive Metabolic Panel (12.20.19 @ 21:00)    Sodium, Serum: 130 mmol/L    Potassium, Serum: 5.4 mmol/L    Chloride, Serum: 84 mmol/L    Carbon Dioxide, Serum: 24 mmol/L    Anion Gap, Serum: 22 mmol/L    Blood Urea Nitrogen, Serum: 92: Critical value:  called to/read back by jamison tobar @ 9:40pm 12/20/19 mg/dL    Creatinine, Serum: 9.5: Icteric. Interpret with caution  Critical value:  called to/read back by jamison tobar @ 9:40pm 12/20/19 mg/dL    Glucose, Serum: 125 mg/dL    Calcium, Total Serum: 8.8 mg/dL    Protein Total, Serum: 7.4 g/dL    Albumin, Serum: 4.1 g/dL    Bilirubin Total, Serum: 7.0 mg/dL    Alkaline Phosphatase, Serum: 714 U/L    Aspartate Aminotransferase (AST/SGOT): 83 U/L    Alanine Aminotransferase (ALT/SGPT): 134 U/L    eGFR if Non : 5: Interpretative comment  The units for eGFR are mL/min/1.73M2 (normalized body surface area). The  eGFR is calculated from a serum creatinine using the CKD-EPI equation.  Other variables required for calculation are race, age and sex. Among  patients with chronic kidney disease (CKD), the eGFR is useful in  determining the stage of disease according to KDOQI CKD classification.  All eGFR results are reported numerically with the following  interpretation.          < from: CT Abdomen and Pelvis w/ IV Cont (12.21.19 @ 01:15) >    IMPRESSION:    Slightly nodular hepatic contour. Given the associated splenomegaly and small volume abdominal pelvic ascites, cirrhosis and portal hypertension is favored.    Cholelithiasis.    Indeterminate 2 cm right upper pole hyperdensity. Given partially imaged pacer wires, recommend nonemergent renal protocol CT for further evaluation.      < end of copied text >    < from: US Abdomen Limited (12.21.19 @ 02:07) >    IMPRESSION:    1.  Hepatomegaly with a lobulated contour suggestive of cirrhosis.    2.  Cholelithiasis with contracted gallbladder wall thickening.    3.  Small volume ascites.    < end of copied text >

## 2019-12-21 NOTE — CONSULT NOTE ADULT - ASSESSMENT
Patient is a 68 yo Turkmen speaking male with PMH of Hypertension, ESRD on HD T/Th/S, Atrial fibrillation not on AC due to GI bleed, DM, MARLI on O2 3l and BiPAP at night, rheumatic heart disease s/p Aortic and Mitral valve replacement present with chief complaint of fever and generalized weakness for 2 days. Patient was found to have transaminitis . Patient presented with RUQ and epigastric pain , that is resolved now denies any abdominal pain , no nausea or vomiting, patient denies any melena, hematemesis , hematochezia . Patient has history of alcohol abuse for around 40 years , used to drink heavily over the weekends. Patient denies any history of liver disease before. Patient quit drinking in 2015. Patient denies  recent Tylenol intake , or herbal medication intake.     CT abdomen and pelvis :   Slightly nodular hepatic contour. Given the associated splenomegaly and small volume abdominal pelvic ascites, cirrhosis and portal hypertension is favored. Cholelithiasis.  US abdomen :   Hepatomegaly by length with a slightly lobulated contour suggestive of cirrhosis. Cholelithiasis with contracted gallbladder wall thickening. Small volume ascites  CBD 6 mm     #transaminitis ( mixed pattern) with imaging and laboratory workup suggestive of liver cirrhosis of indefinite etiology   ( prolonged INR, thrombocytopenia , splenomegaly , nodular liver, ascites )   No hepatic encephalopathy , no asterixis   r/o alcoholic cirrhosis ( given history ) versus other possible etiologies     REC:   MRCP   INR , LFTs twice daily  watch for signs and symptoms of encephalopathy   CLD work up : hepatitis A, B , and C serologies , HBS AG  , HBC AB , EBV, CMV serologies , BETZAIDA , ASMA,  ANTI LKM , AMA , ceruloplasmin , ferritin , transferrin saturation , [id number]   lactulose to ensure 3 bowel movements daily   check adalberto fetoprotein   abdominal paracentesis , sent fluid for cytology , cell count and differential , albumin , total protein   Avoid hepatotoxic drugs   patient will need EGD as outpatient dusty esophageal varices screening Patient is a 68 yo Yakut speaking male with PMH of Hypertension, ESRD on HD T/Th/S, Atrial fibrillation not on AC due to GI bleed, DM, MARLI on O2 3l and BiPAP at night, rheumatic heart disease s/p Aortic and Mitral valve replacement present with chief complaint of fever and generalized weakness for 2 days. Patient was found to have transaminitis . Patient presented with RUQ and epigastric pain , that is resolved now denies any abdominal pain , no nausea or vomiting, patient denies any melena, hematemesis , hematochezia . Patient has history of alcohol abuse for around 40 years , used to drink heavily over the weekends. Patient denies any history of liver disease before. Patient quit drinking in 2015. Patient denies  recent Tylenol intake , or herbal medication intake.     CT abdomen and pelvis :   Slightly nodular hepatic contour. Given the associated splenomegaly and small volume abdominal pelvic ascites, cirrhosis and portal hypertension is favored. Cholelithiasis.  US abdomen :   Hepatomegaly by length with a slightly lobulated contour suggestive of cirrhosis. Cholelithiasis with contracted gallbladder wall thickening. Small volume ascites  CBD 6 mm     #transaminitis ( mixed pattern) with imaging and laboratory workup suggestive of liver cirrhosis of indefinite etiology   ( prolonged INR, thrombocytopenia , splenomegaly , nodular liver, ascites )   No hepatic encephalopathy , no asterixis   r/o alcoholic cirrhosis ( given history ) versus other possible etiologies     REC:   duplex hepatic veins    INR , LFTs daily  watch for signs and symptoms of encephalopathy   CLD work up : hepatitis A, B , and C serologies , HBS AG  , HBC AB , EBV, CMV serologies , BETZAIDA , ASMA,  ANTI LKM , AMA , ceruloplasmin , ferritin , transferrin saturation , [id number]   lactulose to ensure 3 bowel movements daily   check adalberto fetoprotein   Avoid hepatotoxic drugs   patient will need EGD as outpatient for esophageal varices screening Patient is a 68 yo Yakut speaking male with PMH of Hypertension, ESRD on HD T/Th/S, Atrial fibrillation not on AC due to GI bleed, DM, MARLI on O2 3l and BiPAP at night, rheumatic heart disease s/p Aortic and Mitral valve replacement present with chief complaint of fever and generalized weakness for 2 days. Patient was found to have transaminitis . Patient presented with RUQ and epigastric pain , that is resolved now denies any abdominal pain , no nausea or vomiting, patient denies any melena, hematemesis , hematochezia . Patient has history of alcohol abuse for around 40 years , used to drink heavily over the weekends. Patient denies any history of liver disease before. Patient quit drinking in 2015. Patient denies  recent Tylenol intake , or herbal medication intake.     CT abdomen and pelvis :   Slightly nodular hepatic contour. Given the associated splenomegaly and small volume abdominal pelvic ascites, cirrhosis and portal hypertension is favored. Cholelithiasis.  US abdomen :   Hepatomegaly by length with a slightly lobulated contour suggestive of cirrhosis. Cholelithiasis with contracted gallbladder wall thickening. Small volume ascites  CBD 6 mm     #transaminitis ( mixed pattern) with imaging and laboratory workup suggestive of liver cirrhosis of indefinite etiology   ( prolonged INR, thrombocytopenia , splenomegaly , nodular liver, smallascites )   No hepatic encephalopathy , no asterixis   likely alcoholic cirrhosis ( given history ) versus other possible etiologies     REC:   duplex hepatic veins    INR , LFTs daily  watch for signs and symptoms of encephalopathy   CLD work up : hepatitis A, B , and C serologies , HBS AG  , HBC AB , EBV, CMV serologies , BETZAIDA , ASMA,  ANTI LKM , AMA , ceruloplasmin , ferritin , transferrin saturation , [id number]   check adalberto fetoprotein and ultrasound q 6 months  Avoid hepatotoxic drugs   patient will need EGD as outpatient for esophageal varices screening   follow up in liver clinic  will eventually need referral to transplant center

## 2019-12-21 NOTE — H&P ADULT - NSICDXPASTMEDICALHX_GEN_ALL_CORE_FT
PAST MEDICAL HISTORY:  Diabetes mellitus     ESRD (end stage renal disease) on dialysis     HTN (hypertension)

## 2019-12-21 NOTE — CONSULT NOTE ADULT - ASSESSMENT
Assessment:  70 y/o male with PMHx/PSH of HTN, ESRD on HD, DM, Afib not on AC, aortic and mitral valve replacement presents to ED for complaint of fever, nausea, and abdominal distension.  Surgery was consulted for elevated LFTs and US revealing gallstones.    Plan:  - Assessment:  70 y/o male with PMHx/PSH of HTN, ESRD on HD, DM, Afib not on AC, aortic and mitral valve replacement presents to ED for complaint of fever, nausea, and abdominal distension x 3 days, improving. Gallstones on imaging, no leukocytosis, afebrile. Assessment:  68 y/o male with PMHx/PSH of HTN, ESRD on HD, DM, Afib not on AC, aortic and mitral valve replacement presents to ED for complaint of fever, nausea, and abdominal distension x 3 days, improving. Gallstones on imaging, no leukocytosis, afebrile.    Plan:  No acute surgical intervention at this time  Gi recs appreciated - agree with plan, MRCP, Continue to trend lfts   Nephrology vs medicine for management of ckd (hyponatremia, hyperkalemia, elevated BUN    Discussed with Dr. Medrano

## 2019-12-21 NOTE — CONSULT NOTE ADULT - SUBJECTIVE AND OBJECTIVE BOX
HPI:  68 y/o Malay speaking male with PMHx/PSH of HTN, ESRD on HD, DM, Afib not on AC, aortic and mitral valve replacement presents to ED for complaint of fever, nausea, and abdominal distension at home since Wednesday.  He states that he has been feeling better since Wednesday.  He missed his Thursday HD due to these symptoms.  Surgery was consulted for gallstones on US and elevated LFTs. Patient has had endoscopy and colonoscopy in the past due to GI bleeding.  Patient denies currently smoking or alcohol usage.       PAST MEDICAL & SURGICAL HISTORY:  ESRD (end stage renal disease) on dialysis  Diabetes mellitus  HTN (hypertension)  AV fistula: right upper arm  H/O cardiac pacemaker  H/O mitral valve replacement  H/O aortic valve replacement      MEDICATIONS  (STANDING):  atorvastatin 20 milliGRAM(s) Oral at bedtime  calcium acetate 667 milliGRAM(s) Oral four times a day with meals  cefTRIAXone   IVPB 1000 milliGRAM(s) IV Intermittent every 24 hours  finasteride 5 milliGRAM(s) Oral daily  heparin  Injectable 5000 Unit(s) SubCutaneous every 12 hours  pantoprazole    Tablet 40 milliGRAM(s) Oral before breakfast    Allergies    No Known Allergies      REVIEW OF SYSTEMS    [ x] A ten-point review of systems was otherwise negative except as noted.  [ ] Due to altered mental status/intubation, subjective information were not able to be obtained from the patient. History was obtained, to the extent possible, from review of the chart and collateral sources of information.      Vital Signs Last 24 Hrs  T(C): 36.5 (21 Dec 2019 07:33), Max: 37.6 (20 Dec 2019 21:05)  T(F): 97.7 (21 Dec 2019 07:33), Max: 99.7 (20 Dec 2019 21:05)  HR: 84 (21 Dec 2019 07:33) (84 - 93)  BP: 113/72 (21 Dec 2019 07:33) (108/65 - 122/68)  RR: 16 (21 Dec 2019 07:33) (16 - 20)  SpO2: 100% (21 Dec 2019 07:33) (96% - 100%)    PHYSICAL EXAM:  GENERAL: NAD, well-appearing  CHEST/LUNG: Clear to auscultation bilaterally  HEART: Regular rate and rhythm  ABDOMEN: Soft, Nontender, + distension; mild RUQ pain on palpation   EXTREMITIES:  No clubbing, cyanosis, or edema      LABS:                    10.5   7.01  )-----------( 130      ( 21 Dec 2019 05:48 )             33.1             131<L>  |  85<L>  |  97<HH>  ----------------------------<  91  5.0   |  24  |  10.1<HH>      Calcium, Total Serum: 8.9 mg/dL (19 @ 05:48)      LFTs:             7.1  | 6.2  | 59       ------------------[673     ( 21 Dec 2019 05:48 )  4.0  | 5.4  | 105          Blood Gas Venous - Lactate: 1.9 mmoL/L (19 @ 21:17)    Coags:     16.00  ----< 1.40    ( 21 Dec 2019 05:48 )     30.7        CARDIAC MARKERS ( 20 Dec 2019 21:00 )  x     / 0.14 ng/mL / x     / x     / x          Serum Pro-Brain Natriuretic Peptide: 39246 pg/mL (19 @ 21:00)      Urinalysis Basic - ( 20 Dec 2019 22:55 )    Color: Amy / Appearance: Clear / S.016 / pH: x  Gluc: x / Ketone: Negative  / Bili: Small / Urobili: 3 mg/dL   Blood: x / Protein: 30 mg/dL / Nitrite: Negative   Leuk Esterase: Moderate / RBC: 4 /HPF / WBC 16 /HPF   Sq Epi: x / Non Sq Epi: 2 /HPF / Bacteria: Negative      RADIOLOGY & ADDITIONAL STUDIES:  < from: US Abdomen Limited (19 @ 02:07) >  IMPRESSION:    1.  Hepatomegaly by length with a slightly lobulated contour suggestive of cirrhosis.    2.  Cholelithiasis with contracted gallbladder wall thickening.    3.  Small volume ascites.    < end of copied text >    < from: CT Abdomen and Pelvis w/ IV Cont (19 @ 01:15) >  IMPRESSION:    Slightly nodular hepatic contour. Given the associated splenomegaly and small volume abdominal pelvic ascites, cirrhosis and portal hypertension is favored.    Cholelithiasis.    Indeterminate 2 cm right upper pole hyperdensity. Given partially imaged pacer wires, recommend nonemergent renal protocol CT for further evaluation.    < end of copied text >  < from: Xray Chest 1 View AP/PA (12.20.19 @ 20:56) >  Impression:    1. No radiographic evidence of acute cardiopulmonary disease.    2. Cardiomegaly.    < end of copied text > HPI:  70 y/o Swedish speaking male with PMHx/PSH of HTN, ESRD on HD, DM, Afib not on AC, MARLI on home o2, aortic and mitral valve replacement presents to ED for complaint of fever, nausea, and abdominal distension at home since Wednesday.  He states that he has been feeling better since Wednesday.  He missed his Thursday HD due to these symptoms.  Surgery was consulted for gallstones on US and elevated LFTs. Patient has had endoscopy and colonoscopy in the past due to GI bleeding. Patient denies currently smoking or alcohol usage.     (Spoke with patient via  phone ID # [phone])     PAST MEDICAL & SURGICAL HISTORY:  ESRD (end stage renal disease) on dialysis  Diabetes mellitus  HTN (hypertension)  AV fistula: right upper arm  H/O cardiac pacemaker  H/O mitral valve replacement  H/O aortic valve replacement  MARLI on home O2      MEDICATIONS  (STANDING):  atorvastatin 20 milliGRAM(s) Oral at bedtime  calcium acetate 667 milliGRAM(s) Oral four times a day with meals  cefTRIAXone   IVPB 1000 milliGRAM(s) IV Intermittent every 24 hours  finasteride 5 milliGRAM(s) Oral daily  heparin  Injectable 5000 Unit(s) SubCutaneous every 12 hours  pantoprazole    Tablet 40 milliGRAM(s) Oral before breakfast    Allergies  No Known Allergies      REVIEW OF SYSTEMS    [ x] A ten-point review of systems was otherwise negative except as noted.  [ ] Due to altered mental status/intubation, subjective information were not able to be obtained from the patient. History was obtained, to the extent possible, from review of the chart and collateral sources of information.      Vital Signs Last 24 Hrs  T(C): 36.5 (21 Dec 2019 07:33), Max: 37.6 (20 Dec 2019 21:05)  T(F): 97.7 (21 Dec 2019 07:33), Max: 99.7 (20 Dec 2019 21:05)  HR: 84 (21 Dec 2019 07:33) (84 - 93)  BP: 113/72 (21 Dec 2019 07:33) (108/65 - 122/68)  RR: 16 (21 Dec 2019 07:33) (16 - 20)  SpO2: 100% (21 Dec 2019 07:33) (96% - 100%)    PHYSICAL EXAM:  GENERAL: NAD, well-appearing  CHEST/LUNG: Clear to auscultation bilaterally  HEART: Regular rate and rhythm  ABDOMEN: Soft, obese, +distension, mild RUQ tenderness  EXTREMITIES:  No clubbing, cyanosis, or edema      LABS:                    10.5   7.01  )-----------( 130      ( 21 Dec 2019 05:48 )             33.1             131<L>  |  85<L>  |  97<HH>  ----------------------------<  91  5.0   |  24  |  10.1<HH>      Calcium, Total Serum: 8.9 mg/dL (19 @ 05:48)      LFTs:             7.1  | 6.2  | 59       ------------------[673     ( 21 Dec 2019 05:48 )  4.0  | 5.4  | 105          Blood Gas Venous - Lactate: 1.9 mmoL/L (19 @ 21:17)    Coags:     16.00  ----< 1.40    ( 21 Dec 2019 05:48 )     30.7        CARDIAC MARKERS ( 20 Dec 2019 21:00 )  x     / 0.14 ng/mL / x     / x     / x          Serum Pro-Brain Natriuretic Peptide: 23214 pg/mL (19 @ 21:00)      Urinalysis Basic - ( 20 Dec 2019 22:55 )    Color: Amy / Appearance: Clear / S.016 / pH: x  Gluc: x / Ketone: Negative  / Bili: Small / Urobili: 3 mg/dL   Blood: x / Protein: 30 mg/dL / Nitrite: Negative   Leuk Esterase: Moderate / RBC: 4 /HPF / WBC 16 /HPF   Sq Epi: x / Non Sq Epi: 2 /HPF / Bacteria: Negative      RADIOLOGY & ADDITIONAL STUDIES:    < from: US Abdomen Limited (19 @ 02:07) >  IMPRESSION:  1.  Hepatomegaly by length with a slightly lobulated contour suggestive of cirrhosis.  2.  Cholelithiasis with contracted gallbladder wall thickening.  3.  Small volume ascites.  < end of copied text >    < from: CT Abdomen and Pelvis w/ IV Cont (19 @ 01:15) >  IMPRESSION:  Slightly nodular hepatic contour. Given the associated splenomegaly and small volume abdominal pelvic ascites, cirrhosis and portal hypertension is favored.  Cholelithiasis.  Indeterminate 2 cm right upper pole hyperdensity. Given partially imaged pacer wires, recommend nonemergent renal protocol CT for further evaluation.  < end of copied text >    < from: Xray Chest 1 View AP/PA (12 @ 20:56) >  Impression:  1. No radiographic evidence of acute cardiopulmonary disease.  2. Cardiomegaly.  < end of copied text > HPI:  70 y/o Tajik speaking male with PMHx/PSH of HTN, ESRD on HD, DM, Afib not on AC, MARLI on home o2, aortic and mitral valve replacement presents to ED for complaint of fever, nausea, and abdominal distension at home since Wednesday.  He states that he has been feeling better since Wednesday.  He missed his Thursday HD due to these symptoms.  Surgery was consulted for gallstones on US and elevated LFTs. Patient has had endoscopy and colonoscopy in the past due to GI bleeding. Patient denies currently smoking or alcohol usage. Of note patient also had elevated troponins and bnp on admission w/ multiple electrolyte derangements and Tbili of 6      (Spoke with patient via  phone ID # [phone])     PAST MEDICAL & SURGICAL HISTORY:  ESRD (end stage renal disease) on dialysis  Diabetes mellitus  HTN (hypertension)  AV fistula: right upper arm  H/O cardiac pacemaker  H/O mitral valve replacement  H/O aortic valve replacement  MARLI on home O2      MEDICATIONS  (STANDING):  atorvastatin 20 milliGRAM(s) Oral at bedtime  calcium acetate 667 milliGRAM(s) Oral four times a day with meals  cefTRIAXone   IVPB 1000 milliGRAM(s) IV Intermittent every 24 hours  finasteride 5 milliGRAM(s) Oral daily  heparin  Injectable 5000 Unit(s) SubCutaneous every 12 hours  pantoprazole    Tablet 40 milliGRAM(s) Oral before breakfast    Allergies  No Known Allergies      REVIEW OF SYSTEMS    [ x] A ten-point review of systems was otherwise negative except as noted.  [ ] Due to altered mental status/intubation, subjective information were not able to be obtained from the patient. History was obtained, to the extent possible, from review of the chart and collateral sources of information.      Vital Signs Last 24 Hrs  T(C): 36.5 (21 Dec 2019 07:33), Max: 37.6 (20 Dec 2019 21:05)  T(F): 97.7 (21 Dec 2019 07:33), Max: 99.7 (20 Dec 2019 21:05)  HR: 84 (21 Dec 2019 07:33) (84 - 93)  BP: 113/72 (21 Dec 2019 07:33) (108/65 - 122/68)  RR: 16 (21 Dec 2019 07:33) (16 - 20)  SpO2: 100% (21 Dec 2019 07:33) (96% - 100%)    PHYSICAL EXAM:  GENERAL: NAD, appears sob, on NC  CHEST/LUNG: Clear to auscultation bilaterally, breathing laboured, previous sternotomy incision well healed  HEART: Regular rate and rhythm  ABDOMEN: Soft, obese, +distension, mild RUQ tenderness  EXTREMITIES:  No clubbing, cyanosis, or edema      LABS:                    10.5   7.01  )-----------( 130      ( 21 Dec 2019 05:48 )             33.1             131<L>  |  85<L>  |  97<HH>  ----------------------------<  91  5.0   |  24  |  10.1<HH>      Calcium, Total Serum: 8.9 mg/dL (19 @ 05:48)      LFTs:             7.1  | 6.2  | 59       ------------------[673     ( 21 Dec 2019 05:48 )  4.0  | 5.4  | 105          Blood Gas Venous - Lactate: 1.9 mmoL/L (19 @ 21:17)    Coags:     16.00  ----< 1.40    ( 21 Dec 2019 05:48 )     30.7        CARDIAC MARKERS ( 20 Dec 2019 21:00 )  x     / 0.14 ng/mL / x     / x     / x          Serum Pro-Brain Natriuretic Peptide: 94824 pg/mL (19 @ 21:00)      Urinalysis Basic - ( 20 Dec 2019 22:55 )    Color: Amy / Appearance: Clear / S.016 / pH: x  Gluc: x / Ketone: Negative  / Bili: Small / Urobili: 3 mg/dL   Blood: x / Protein: 30 mg/dL / Nitrite: Negative   Leuk Esterase: Moderate / RBC: 4 /HPF / WBC 16 /HPF   Sq Epi: x / Non Sq Epi: 2 /HPF / Bacteria: Negative      RADIOLOGY & ADDITIONAL STUDIES:    < from: US Abdomen Limited (19 @ 02:07) >  IMPRESSION:  1.  Hepatomegaly by length with a slightly lobulated contour suggestive of cirrhosis.  2.  Cholelithiasis with contracted gallbladder wall thickening.  3.  Small volume ascites.  < end of copied text >    < from: CT Abdomen and Pelvis w/ IV Cont (19 @ 01:15) >  IMPRESSION:  Slightly nodular hepatic contour. Given the associated splenomegaly and small volume abdominal pelvic ascites, cirrhosis and portal hypertension is favored.  Cholelithiasis.  Indeterminate 2 cm right upper pole hyperdensity. Given partially imaged pacer wires, recommend nonemergent renal protocol CT for further evaluation.  < end of copied text >    < from: Xray Chest 1 View AP/PA (12.20.19 @ 20:56) >  Impression:  1. No radiographic evidence of acute cardiopulmonary disease.  2. Cardiomegaly.  < end of copied text >

## 2019-12-21 NOTE — CONSULT NOTE ADULT - SUBJECTIVE AND OBJECTIVE BOX
Gastroenterology Consultation:    Patient is a 69y old  Male who presents with a chief complaint of Weakness (21 Dec 2019 02:23)      Admitted on: 12-21-19  HPI:  Patient is a 68 yo [place]ian speaking male with PMH of Hypertension, ESRD on HD T/Th/S, Atrial fibrillation not on AC due to GI bleed, DM, MARLI on O2 3l and BiPAP at night, rheumatic heart disease s/p Aortic and Mitral valve replacement present with chief complaint of fever and generalized weakness for 2 days. He was having abdominal pain in right upper quadrant region, intermittent, associated with severe nausea yesterday. Also c/o subjective fevers at home. He didn't go to his HD yesterday because of the symptoms. Patient also has shortness of breath on exertion at baseline and worsening lower extremity edema in the last few weeks. No c/o vomiting, diarrhea, constipation, hematemesis, hematochezia. No recent weight loss. No history of gall stones, no family history of malignancy. Patient was a smoker since age of 11, 1.5pc/day and stopped 4yrs ago. He was also a chronic alcoholic, stopped in 2015 after his valve replacement surgery. He had EGD and colonoscopy in 2015 due to GI bleeding which as per the daughter was normal. Patient was never diagnosed with Cirrhosis in the past.  In ED, /68 , HR 86, Platelet 127, bilirubin 7, alkaline phosphatase 714, AST 83, , troponin 0.14, BNP 50605. CT abdomen showed Slightly nodular hepatic contour with associated splenomegaly and small volume abdominal pelvic ascites. US abdomen showed cholelithiasis with contracted gallbladder wall thickening, nodular liver. (21 Dec 2019 02:23).     GI related history: Patient is a 68 yo [place] speaking male with PMH of Hypertension, ESRD on HD T/Th/S, Atrial fibrillation not on AC due to GI bleed, DM, MARLI on O2 3l and BiPAP at night, rheumatic heart disease s/p Aortic and Mitral valve replacement present with chief complaint of fever and generalized weakness for 2 days. Patient was found to have transaminitis . Patient presented with RUQ and epigastric pain , that is resolved now denies any abdominal pain , no nausea or vomiting, patient denies any melena, hematemesis , hematochezia . Patient has history of alcohol abuse for around 40 years , used to drink heavily over the weekends. Patient denies any history of liver disease before. Patient quit drinking in 2015. Patient denies  recent Tylenol intake , or herbal medication intake       Prior records Reviewed (Y/N): none available   History obtained from person other than patient (Y/N): Y    Prior EGD:  Prior Colonoscopy:      PAST MEDICAL & SURGICAL HISTORY:  ESRD (end stage renal disease) on dialysis  Diabetes mellitus  HTN (hypertension)  AV fistula: right upper arm  H/O cardiac pacemaker  H/O mitral valve replacement  H/O aortic valve replacement      FAMILY HISTORY: no pertinent family history       Social History:  Tobacco: X- smoker   Alcohol: history of alcohol abuse , stopped 2015  Drugs: denies     Home Medications:  calcium acetate 667 mg oral capsule: 1 cap(s) orally 3 times a day (with meals) (21 Dec 2019 03:21)  finasteride 5 mg oral tablet: 1 tab(s) orally once a day (21 Dec 2019 03:21)  pantoprazole 40 mg oral delayed release tablet: 1 tab(s) orally once a day (21 Dec 2019 03:21)  rosuvastatin 5 mg oral tablet: 1 tab(s) orally once a day (21 Dec 2019 03:21)    MEDICATIONS  (STANDING):  atorvastatin 20 milliGRAM(s) Oral at bedtime  calcium acetate 667 milliGRAM(s) Oral four times a day with meals  cefTRIAXone   IVPB 1000 milliGRAM(s) IV Intermittent every 24 hours  finasteride 5 milliGRAM(s) Oral daily  heparin  Injectable 5000 Unit(s) SubCutaneous every 12 hours  pantoprazole    Tablet 40 milliGRAM(s) Oral before breakfast    MEDICATIONS  (PRN):      Allergies  No Known Allergies      Review of Systems:   Constitutional:  No Fever, No Chills  ENT/Mouth:  No Hearing Changes,  No Difficulty Swallowing  Eyes:  No Eye Pain, No Vision Changes  Cardiovascular:  No Chest Pain, No Palpitations  Respiratory:  No Cough, No Dyspnea  Gastrointestinal:  As described in HPI  Musculoskeletal:  No Joint Swelling, No Back Pain  Skin:  No Skin Lesions, No Jaundice  Neuro:  No Syncope, No Dizziness  Heme/Lymph:  No Bruising, No Bleeding.          Physical Examination:  T(C): 36.5 (12-21-19 @ 07:33), Max: 37.6 (12-20-19 @ 21:05)  HR: 84 (12-21-19 @ 07:33) (84 - 93)  BP: 113/72 (12-21-19 @ 07:33) (108/65 - 122/68)  RR: 16 (12-21-19 @ 07:33) (16 - 20)  SpO2: 100% (12-21-19 @ 07:33) (96% - 100%)        Constitutional: No acute distress.  Eyes:. Conjunctivae are clear, Sclera is icteric.  Ears Nose and Throat: The external ears are normal appearing,  Oral mucosa is pink and moist.  Respiratory:  No signs of respiratory distress. Lung sounds are clear bilaterally.  Cardiovascular:  S1 S2, Regular rate and rhythm.  GI: Abdomen is soft, symmetric, and non-tender, + abdominal  distention. There are no visible lesions or scars. Bowel sounds are present and normoactive in all four quadrants. No masses, hepatomegaly, or splenomegaly are noted on physical exam .   Neuro: No Tremor, No involuntary movements  Skin: No rashes, + Jaundice.          Data: (reviewed by attending)                        10.5   7.01  )-----------( 130      ( 21 Dec 2019 05:48 )             33.1     Hgb Trend:  10.5  12-21-19 @ 05:48  10.9  12-20-19 @ 21:00      12-21    131<L>  |  85<L>  |  97<HH>  ----------------------------<  91  5.0   |  24  |  10.1<HH>    Ca    8.9      21 Dec 2019 05:48  Mg     2.3     12-21    TPro  7.1  /  Alb  4.0  /  TBili  6.2<H>  /  DBili  5.4<H>  /  AST  59<H>  /  ALT  105<H>  /  AlkPhos  673<H>  12-21    Liver panel trend:  TBili 6.2   /   AST 59   /      /   AlkP 673   /   Tptn 7.1   /   Alb 4.0    /   DBili 5.4      12-21  TBili 7.0   /   AST 83   /      /   AlkP 714   /   Tptn 7.4   /   Alb 4.1    /   DBili --      12-20      PT/INR - ( 21 Dec 2019 05:48 )   PT: 16.00 sec;   INR: 1.40 ratio         PTT - ( 21 Dec 2019 05:48 )  PTT:30.7 sec        Radiology:(reviewed by attending)  CT Abdomen and Pelvis w/ IV Cont:   EXAM:  CT ABDOMEN AND PELVIS IC            PROCEDURE DATE:  12/21/2019            INTERPRETATION:  CLINICAL STATEMENT: Elevated LFTs      TECHNIQUE: Contiguous CT images were obtained of the abdomen and pelvis.  Intravenous Contrast:  Optiray 320 intravenous contrast administered.    Oral contrast was not administered.      COMPARISON:  None    FINDINGS:    LOWER CHEST: Bibasilar dependent subsegmental atelectasis. Cardiomegaly with partially imaged pacer wires.    LIVER: Limited evaluation given phase of contrast, however slightly nodular contour appearance to the left hepatic lobe.    SPLEEN: Splenomegaly to 13.5 cm craniocaudad      PANCREAS: Unremarkable.    GALLBLADDER AND BILIARY TREE: Contracted gallbladder with cholelithiasis. Nonspecific gallbladder wall edema which can be seen in the setting of liver disease. No biliary ductal dilatation.    ADRENALS: Unremarkable.    KIDNEYS: Symmetric pattern of renal enhancement. No hydronephrosis. 10 cm right lower pole cyst. 4 cm left renal exophytic hypodensity likely reflects a cyst, however limited evaluation for Hounsfield measurements given adjacent streak artifact from left gantry. Indeterminate 2 cm right upper pole exophytic hyperdensity. Additional hypodensities too small to characterize.    LYMPH NODES: There are no enlarged abdominal or pelvic lymph nodes.    VASCULATURE: The abdominal aorta is normal in caliber and demonstrates atherosclerotic changes.    BOWEL: No bowel obstruction or bowel wall thickening. Unremarkable appendix    PERITONEUM/RETROPERITONEUM/MESENTERY: Small volume abdominal pelvic ascites. No pneumoperitoneum    PELVIC VISCERA: Unremarkable.    BONES AND SOFT TISSUES: Severe degenerative changes of the left hip. Additional degenerative changes ofthe right hip, bilateral sacroiliac joints and spine.     IMPRESSION:    Slightly nodular hepatic contour. Given the associated splenomegaly and small volume abdominal pelvic ascites, cirrhosis and portal hypertension is favored.    Cholelithiasis.    Indeterminate 2 cm right upper pole hyperdensity. Given partially imaged pacer wires, recommend nonemergent renal protocol CT for further evaluation.                  TISHA FISCHER M.D., ATTENDING RADIOLOGIST  This document has been electronically signed. Dec 21 2019  1:42AM             (12-21-19 @ 01:15)    US Abdomen Limited:   EXAM:  US ABDOMEN LIMITED            PROCEDURE DATE:  12/21/2019            INTERPRETATION:  CLINICAL HISTORY: Elevated liver function tests.    COMPARISON: Correlation is made with CT abdomen and pelvis from earlier the same day.    PROCEDURE: Ultrasound of the right upper quadrant was performed.    FINDINGS:    LIVER: Slightly lobulated hepatic contour with length measuring 25 cm. No focal mass.    GALLBLADDER/BILIARY TREE:  Cholelithiasis. Gallbladder wall is contracted and thickened.  Negative sonographic House's sign. No intrahepatic biliary ductal dilatation. The common bile duct measures 6 mm, which is normal.     PANCREAS:  Obscured by overlying bowel gas.    KIDNEY:  Right kidney measures 8.9 cm in length, with suggestion of mild cortical thinning which may reflect a component of atrophy. There are right renal cysts measuring up to at least 8 cm. A 2 cm likely cyst in the mid to upper pole is noted for which it is unclear if this correlates with the interpolar region cyst on concurrent CT or with the indeterminate exophytic hyperdensity. No hydronephrosis or calculus identified.    AORTA/IVC:  Visualized proximal portions unremarkable.    ASCITES: Small volume right upper quadrant ascites.      IMPRESSION:    1.  Hepatomegaly by length with a slightly lobulated contour suggestive of cirrhosis.    2.  Cholelithiasis with contracted gallbladder wall thickening.    3.  Small volume ascites.              ALISON GUSMAN M.D., RESIDENT RADIOLOGIST  This document has been electronically signed.  TISHA FISCHER M.D., ATTENDING RADIOLOGIST  This document has been electronically signed. Dec 21 2019  3:30AM             (12-21-19 @ 02:07) Gastroenterology Consultation:    Patient is a 69y old  Male who presents with a chief complaint of Weakness (21 Dec 2019 02:23)      Admitted on: 12-21-19  HPI:  Patient is a 68 yo Armenian speaking male with PMH of Hypertension, ESRD on HD T/Th/S, Atrial fibrillation not on AC due to GI bleed, DM, MARLI on O2 3l and BiPAP at night, rheumatic heart disease s/p Aortic and Mitral valve replacement present with chief complaint of fever and generalized weakness for 2 days. He was having abdominal pain in right upper quadrant region, intermittent, associated with severe nausea yesterday. Also c/o subjective fevers at home. He didn't go to his HD yesterday because of the symptoms. Patient also has shortness of breath on exertion at baseline and worsening lower extremity edema in the last few weeks. No c/o vomiting, diarrhea, constipation, hematemesis, hematochezia. No recent weight loss. No history of gall stones, no family history of malignancy. Patient was a smoker since age of 11, 1.5pc/day and stopped 4yrs ago. He was also a chronic alcoholic, stopped in 2015 after his valve replacement surgery. He had EGD and colonoscopy in 2015 due to GI bleeding which as per the daughter was normal. Patient was never diagnosed with Cirrhosis in the past.  In ED, /68 , HR 86, Platelet 127, bilirubin 7, alkaline phosphatase 714, AST 83, , troponin 0.14, BNP 83392. CT abdomen showed Slightly nodular hepatic contour with associated splenomegaly and small volume abdominal pelvic ascites. US abdomen showed cholelithiasis with contracted gallbladder wall thickening, nodular liver. (21 Dec 2019 02:23).     GI related history: Patient is a 68 yo Armenian speaking male with PMH of Hypertension, ESRD on HD T/Th/S, Atrial fibrillation not on AC due to GI bleed, DM, MARLI on O2 3l and BiPAP at night, rheumatic heart disease s/p Aortic and Mitral valve replacement present with chief complaint of fever and generalized weakness for 2 days. Patient was found to have transaminitis . Patient presented with RUQ and epigastric pain , that is resolved now denies any abdominal pain , no nausea or vomiting, patient denies any melena, hematemesis , hematochezia . Patient has history of alcohol abuse for around 40 years , used to drink heavily over the weekends. Patient denies any history of liver disease before. Patient quit drinking in 2015. Patient denies  recent Tylenol intake , or herbal medication intake       Prior records Reviewed (Y/N): none available   History obtained from person other than patient (Y/N): Y           PAST MEDICAL & SURGICAL HISTORY:  ESRD (end stage renal disease) on dialysis  Diabetes mellitus  HTN (hypertension)  AV fistula: right upper arm  H/O cardiac pacemaker  H/O mitral valve replacement  H/O aortic valve replacement      FAMILY HISTORY: no pertinent family history       Social History:  Tobacco: X- smoker   Alcohol: history of alcohol abuse , stopped 2015  Drugs: denies     Home Medications:  calcium acetate 667 mg oral capsule: 1 cap(s) orally 3 times a day (with meals) (21 Dec 2019 03:21)  finasteride 5 mg oral tablet: 1 tab(s) orally once a day (21 Dec 2019 03:21)  pantoprazole 40 mg oral delayed release tablet: 1 tab(s) orally once a day (21 Dec 2019 03:21)  rosuvastatin 5 mg oral tablet: 1 tab(s) orally once a day (21 Dec 2019 03:21)    MEDICATIONS  (STANDING):  atorvastatin 20 milliGRAM(s) Oral at bedtime  calcium acetate 667 milliGRAM(s) Oral four times a day with meals  cefTRIAXone   IVPB 1000 milliGRAM(s) IV Intermittent every 24 hours  finasteride 5 milliGRAM(s) Oral daily  heparin  Injectable 5000 Unit(s) SubCutaneous every 12 hours  pantoprazole    Tablet 40 milliGRAM(s) Oral before breakfast    MEDICATIONS  (PRN):      Allergies  No Known Allergies      Review of Systems:   Constitutional:  No Fever, No Chills  ENT/Mouth:  No Hearing Changes,  No Difficulty Swallowing  Eyes:  No Eye Pain, No Vision Changes  Cardiovascular:  No Chest Pain, No Palpitations  Respiratory:  No Cough, No Dyspnea  Gastrointestinal:  As described in HPI  Musculoskeletal:  No Joint Swelling, No Back Pain  Skin:  No Skin Lesions, No Jaundice  Neuro:  No Syncope, No Dizziness  Heme/Lymph:  No Bruising, No Bleeding.          Physical Examination:  T(C): 36.5 (12-21-19 @ 07:33), Max: 37.6 (12-20-19 @ 21:05)  HR: 84 (12-21-19 @ 07:33) (84 - 93)  BP: 113/72 (12-21-19 @ 07:33) (108/65 - 122/68)  RR: 16 (12-21-19 @ 07:33) (16 - 20)  SpO2: 100% (12-21-19 @ 07:33) (96% - 100%)        Constitutional: No acute distress.  Eyes:. Conjunctivae are clear, Sclera is icteric.  Ears Nose and Throat: The external ears are normal appearing,  Oral mucosa is pink and moist.  Respiratory:  No signs of respiratory distress. Lung sounds are clear bilaterally.  Cardiovascular:  S1 S2, Regular rate and rhythm.  GI: Abdomen is soft, symmetric, and non-tender, + abdominal  distention. There are no visible lesions or scars. Bowel sounds are present and normoactive in all four quadrants. No masses, hepatomegaly, or splenomegaly are noted on physical exam .   Neuro: No Tremor, No involuntary movements  Skin: No rashes, + Jaundice.          Data: (reviewed by attending)                        10.5   7.01  )-----------( 130      ( 21 Dec 2019 05:48 )             33.1     Hgb Trend:  10.5  12-21-19 @ 05:48  10.9  12-20-19 @ 21:00      12-21    131<L>  |  85<L>  |  97<HH>  ----------------------------<  91  5.0   |  24  |  10.1<HH>    Ca    8.9      21 Dec 2019 05:48  Mg     2.3     12-21    TPro  7.1  /  Alb  4.0  /  TBili  6.2<H>  /  DBili  5.4<H>  /  AST  59<H>  /  ALT  105<H>  /  AlkPhos  673<H>  12-21    Liver panel trend:  TBili 6.2   /   AST 59   /      /   AlkP 673   /   Tptn 7.1   /   Alb 4.0    /   DBili 5.4      12-21  TBili 7.0   /   AST 83   /      /   AlkP 714   /   Tptn 7.4   /   Alb 4.1    /   DBili --      12-20      PT/INR - ( 21 Dec 2019 05:48 )   PT: 16.00 sec;   INR: 1.40 ratio         PTT - ( 21 Dec 2019 05:48 )  PTT:30.7 sec        Radiology:(reviewed by attending)  CT Abdomen and Pelvis w/ IV Cont:   EXAM:  CT ABDOMEN AND PELVIS IC            PROCEDURE DATE:  12/21/2019            INTERPRETATION:  CLINICAL STATEMENT: Elevated LFTs      TECHNIQUE: Contiguous CT images were obtained of the abdomen and pelvis.  Intravenous Contrast:  Optiray 320 intravenous contrast administered.    Oral contrast was not administered.      COMPARISON:  None    FINDINGS:    LOWER CHEST: Bibasilar dependent subsegmental atelectasis. Cardiomegaly with partially imaged pacer wires.    LIVER: Limited evaluation given phase of contrast, however slightly nodular contour appearance to the left hepatic lobe.    SPLEEN: Splenomegaly to 13.5 cm craniocaudad      PANCREAS: Unremarkable.    GALLBLADDER AND BILIARY TREE: Contracted gallbladder with cholelithiasis. Nonspecific gallbladder wall edema which can be seen in the setting of liver disease. No biliary ductal dilatation.    ADRENALS: Unremarkable.    KIDNEYS: Symmetric pattern of renal enhancement. No hydronephrosis. 10 cm right lower pole cyst. 4 cm left renal exophytic hypodensity likely reflects a cyst, however limited evaluation for Hounsfield measurements given adjacent streak artifact from left gantry. Indeterminate 2 cm right upper pole exophytic hyperdensity. Additional hypodensities too small to characterize.    LYMPH NODES: There are no enlarged abdominal or pelvic lymph nodes.    VASCULATURE: The abdominal aorta is normal in caliber and demonstrates atherosclerotic changes.    BOWEL: No bowel obstruction or bowel wall thickening. Unremarkable appendix    PERITONEUM/RETROPERITONEUM/MESENTERY: Small volume abdominal pelvic ascites. No pneumoperitoneum    PELVIC VISCERA: Unremarkable.    BONES AND SOFT TISSUES: Severe degenerative changes of the left hip. Additional degenerative changes ofthe right hip, bilateral sacroiliac joints and spine.     IMPRESSION:    Slightly nodular hepatic contour. Given the associated splenomegaly and small volume abdominal pelvic ascites, cirrhosis and portal hypertension is favored.    Cholelithiasis.    Indeterminate 2 cm right upper pole hyperdensity. Given partially imaged pacer wires, recommend nonemergent renal protocol CT for further evaluation.                  TISHA FISCHER M.D., ATTENDING RADIOLOGIST  This document has been electronically signed. Dec 21 2019  1:42AM             (12-21-19 @ 01:15)    US Abdomen Limited:   EXAM:  US ABDOMEN LIMITED            PROCEDURE DATE:  12/21/2019            INTERPRETATION:  CLINICAL HISTORY: Elevated liver function tests.    COMPARISON: Correlation is made with CT abdomen and pelvis from earlier the same day.    PROCEDURE: Ultrasound of the right upper quadrant was performed.    FINDINGS:    LIVER: Slightly lobulated hepatic contour with length measuring 25 cm. No focal mass.    GALLBLADDER/BILIARY TREE:  Cholelithiasis. Gallbladder wall is contracted and thickened.  Negative sonographic House's sign. No intrahepatic biliary ductal dilatation. The common bile duct measures 6 mm, which is normal.     PANCREAS:  Obscured by overlying bowel gas.    KIDNEY:  Right kidney measures 8.9 cm in length, with suggestion of mild cortical thinning which may reflect a component of atrophy. There are right renal cysts measuring up to at least 8 cm. A 2 cm likely cyst in the mid to upper pole is noted for which it is unclear if this correlates with the interpolar region cyst on concurrent CT or with the indeterminate exophytic hyperdensity. No hydronephrosis or calculus identified.    AORTA/IVC:  Visualized proximal portions unremarkable.    ASCITES: Small volume right upper quadrant ascites.      IMPRESSION:    1.  Hepatomegaly by length with a slightly lobulated contour suggestive of cirrhosis.    2.  Cholelithiasis with contracted gallbladder wall thickening.    3.  Small volume ascites.              ALISON GUSMAN M.D., RESIDENT RADIOLOGIST  This document has been electronically signed.  TISHA FISCHER M.D., ATTENDING RADIOLOGIST  This document has been electronically signed. Dec 21 2019  3:30AM             (12-21-19 @ 02:07)

## 2019-12-21 NOTE — CONSULT NOTE ADULT - ATTENDING COMMENTS
69 male with history ETOH (last drink 2015) with likely cirrhosis, small ascites, no encephalopathy  - CLD w/up  - Ultrasound with duplex  - outpatient follow up in liver clinic for EGD, HCC screening
Patient seen and examined with surgery resident in [place] on rounds and discussed management plans. Patient obese built with distended abdomen and palpable enlarged liver with well healed sternal scar and drain scars upper abdomen from 2001 and 2015 surgery at [facility] as per patient presents with intermittent abd pain No vomiting hungry. Clinically jaundiced sono CT and labs reviewed. Clinically does not have acute cholecystitis, High surgical risk elevated enzymes probably related to liver needs GI primarily carole need ERCP as patient can not have MRCP

## 2019-12-22 LAB
ALBUMIN SERPL ELPH-MCNC: 3.7 G/DL — SIGNIFICANT CHANGE UP (ref 3.5–5.2)
ALP SERPL-CCNC: 745 U/L — HIGH (ref 30–115)
ALT FLD-CCNC: 76 U/L — HIGH (ref 0–41)
ANION GAP SERPL CALC-SCNC: 20 MMOL/L — HIGH (ref 7–14)
APTT BLD: 29.6 SEC — SIGNIFICANT CHANGE UP (ref 27–39.2)
AST SERPL-CCNC: 35 U/L — SIGNIFICANT CHANGE UP (ref 0–41)
BASOPHILS # BLD AUTO: 0.02 K/UL — SIGNIFICANT CHANGE UP (ref 0–0.2)
BASOPHILS NFR BLD AUTO: 0.3 % — SIGNIFICANT CHANGE UP (ref 0–1)
BILIRUB DIRECT SERPL-MCNC: 3 MG/DL — HIGH (ref 0–0.2)
BILIRUB INDIRECT FLD-MCNC: 0.7 MG/DL — SIGNIFICANT CHANGE UP (ref 0.2–1.2)
BILIRUB SERPL-MCNC: 3.7 MG/DL — HIGH (ref 0.2–1.2)
BLD GP AB SCN SERPL QL: SIGNIFICANT CHANGE UP
BUN SERPL-MCNC: 77 MG/DL — CRITICAL HIGH (ref 10–20)
CALCIUM SERPL-MCNC: 8.5 MG/DL — SIGNIFICANT CHANGE UP (ref 8.5–10.1)
CHLORIDE SERPL-SCNC: 89 MMOL/L — LOW (ref 98–110)
CO2 SERPL-SCNC: 25 MMOL/L — SIGNIFICANT CHANGE UP (ref 17–32)
CREAT SERPL-MCNC: 8.7 MG/DL — CRITICAL HIGH (ref 0.7–1.5)
CULTURE RESULTS: SIGNIFICANT CHANGE UP
EOSINOPHIL # BLD AUTO: 0.09 K/UL — SIGNIFICANT CHANGE UP (ref 0–0.7)
EOSINOPHIL NFR BLD AUTO: 1.6 % — SIGNIFICANT CHANGE UP (ref 0–8)
GLUCOSE BLDC GLUCOMTR-MCNC: 122 MG/DL — HIGH (ref 70–99)
GLUCOSE BLDC GLUCOMTR-MCNC: 123 MG/DL — HIGH (ref 70–99)
GLUCOSE BLDC GLUCOMTR-MCNC: 126 MG/DL — HIGH (ref 70–99)
GLUCOSE SERPL-MCNC: 123 MG/DL — HIGH (ref 70–99)
HCT VFR BLD CALC: 32.3 % — LOW (ref 42–52)
HGB BLD-MCNC: 10.1 G/DL — LOW (ref 14–18)
IMM GRANULOCYTES NFR BLD AUTO: 0.5 % — HIGH (ref 0.1–0.3)
INR BLD: 1.28 RATIO — SIGNIFICANT CHANGE UP (ref 0.65–1.3)
LYMPHOCYTES # BLD AUTO: 0.44 K/UL — LOW (ref 1.2–3.4)
LYMPHOCYTES # BLD AUTO: 7.6 % — LOW (ref 20.5–51.1)
MAGNESIUM SERPL-MCNC: 2.4 MG/DL — SIGNIFICANT CHANGE UP (ref 1.8–2.4)
MCHC RBC-ENTMCNC: 27.5 PG — SIGNIFICANT CHANGE UP (ref 27–31)
MCHC RBC-ENTMCNC: 31.3 G/DL — LOW (ref 32–37)
MCV RBC AUTO: 88 FL — SIGNIFICANT CHANGE UP (ref 80–94)
MONOCYTES # BLD AUTO: 0.74 K/UL — HIGH (ref 0.1–0.6)
MONOCYTES NFR BLD AUTO: 12.8 % — HIGH (ref 1.7–9.3)
NEUTROPHILS # BLD AUTO: 4.48 K/UL — SIGNIFICANT CHANGE UP (ref 1.4–6.5)
NEUTROPHILS NFR BLD AUTO: 77.2 % — HIGH (ref 42.2–75.2)
NRBC # BLD: 0 /100 WBCS — SIGNIFICANT CHANGE UP (ref 0–0)
PHOSPHATE SERPL-MCNC: 6.7 MG/DL — HIGH (ref 2.1–4.9)
PLATELET # BLD AUTO: 120 K/UL — LOW (ref 130–400)
POTASSIUM SERPL-MCNC: 4.7 MMOL/L — SIGNIFICANT CHANGE UP (ref 3.5–5)
POTASSIUM SERPL-SCNC: 4.7 MMOL/L — SIGNIFICANT CHANGE UP (ref 3.5–5)
PROT SERPL-MCNC: 7.1 G/DL — SIGNIFICANT CHANGE UP (ref 6–8)
PROTHROM AB SERPL-ACNC: 14.7 SEC — HIGH (ref 9.95–12.87)
RBC # BLD: 3.67 M/UL — LOW (ref 4.7–6.1)
RBC # FLD: 16.6 % — HIGH (ref 11.5–14.5)
SODIUM SERPL-SCNC: 134 MMOL/L — LOW (ref 135–146)
SPECIMEN SOURCE: SIGNIFICANT CHANGE UP
TRANSFERRIN SERPL-MCNC: 170 MG/DL — LOW (ref 200–360)
TROPONIN T SERPL-MCNC: 0.13 NG/ML — CRITICAL HIGH
WBC # BLD: 5.8 K/UL — SIGNIFICANT CHANGE UP (ref 4.8–10.8)
WBC # FLD AUTO: 5.8 K/UL — SIGNIFICANT CHANGE UP (ref 4.8–10.8)

## 2019-12-22 PROCEDURE — 99222 1ST HOSP IP/OBS MODERATE 55: CPT

## 2019-12-22 RX ORDER — METOCLOPRAMIDE HCL 10 MG
2.5 TABLET ORAL ONCE
Refills: 0 | Status: COMPLETED | OUTPATIENT
Start: 2019-12-22 | End: 2019-12-22

## 2019-12-22 RX ORDER — METOCLOPRAMIDE HCL 10 MG
2.5 TABLET ORAL ONCE
Refills: 0 | Status: DISCONTINUED | OUTPATIENT
Start: 2019-12-22 | End: 2019-12-22

## 2019-12-22 RX ADMIN — PANTOPRAZOLE SODIUM 40 MILLIGRAM(S): 20 TABLET, DELAYED RELEASE ORAL at 06:26

## 2019-12-22 RX ADMIN — HEPARIN SODIUM 5000 UNIT(S): 5000 INJECTION INTRAVENOUS; SUBCUTANEOUS at 17:43

## 2019-12-22 RX ADMIN — ATORVASTATIN CALCIUM 20 MILLIGRAM(S): 80 TABLET, FILM COATED ORAL at 21:23

## 2019-12-22 RX ADMIN — HEPARIN SODIUM 5000 UNIT(S): 5000 INJECTION INTRAVENOUS; SUBCUTANEOUS at 06:26

## 2019-12-22 RX ADMIN — MEROPENEM 100 MILLIGRAM(S): 1 INJECTION INTRAVENOUS at 17:43

## 2019-12-22 RX ADMIN — Medication 667 MILLIGRAM(S): at 08:29

## 2019-12-22 RX ADMIN — Medication 667 MILLIGRAM(S): at 17:43

## 2019-12-22 RX ADMIN — Medication 667 MILLIGRAM(S): at 21:23

## 2019-12-22 RX ADMIN — FINASTERIDE 5 MILLIGRAM(S): 5 TABLET, FILM COATED ORAL at 11:11

## 2019-12-22 RX ADMIN — Medication 2.5 MILLIGRAM(S): at 06:26

## 2019-12-22 RX ADMIN — Medication 667 MILLIGRAM(S): at 14:46

## 2019-12-22 NOTE — CONSULT NOTE ADULT - SUBJECTIVE AND OBJECTIVE BOX
JOEY MARILYN  69y, Male  Allergy: No Known Allergies      CHIEF COMPLAINT: Weakness (22 Dec 2019 10:06)      HPI:  Patient is a 68 yo [place]ian speaking male with PMH of Hypertension, ESRD on HD T//S, Atrial fibrillation not on AC due to GI bleed, DM, MARLI on O2 3l and BiPAP at night, rheumatic heart disease s/p Aortic and Mitral valve replacement present with chief complaint of fever and generalized weakness for 2 days. He was having abdominal pain in right upper quadrant region, intermittent, associated with severe nausea yesterday. Also c/o subjective fevers at home. He didn't go to his HD yesterday because of the symptoms. Patient also has shortness of breath on exertion at baseline and worsening lower extremity edema in the last few weeks. No c/o vomiting, diarrhea, constipation, hematemesis, hematochezia. No recent weight loss. No history of gall stones, no family history of malignancy. Patient was a smoker since age of 11, 1.5pc/day and stopped 4yrs ago. He was also a chronic alcoholic, stopped in  after his valve replacement surgery. He had EGD and colonoscopy in 2015 due to GI bleeding which as per the daughter was normal. Patient was never diagnosed with Cirrhosis in the past.  In ED, /68 , HR 86, Platelet 127, bilirubin 7, alkaline phosphatase 714, AST 83, , troponin 0.14, BNP 04365. CT abdomen showed Slightly nodular hepatic contour with associated splenomegaly and small volume abdominal pelvic ascites. US abdomen showed cholelithiasis with contracted gallbladder wall thickening, nodular liver. (21 Dec 2019 02:23)      INFECTIOUS DISEASE HISTORY:   bcx kleb   PAST MEDICAL & SURGICAL HISTORY:  ESRD (end stage renal disease) on dialysis  Diabetes mellitus  HTN (hypertension)  AV fistula: right upper arm  H/O cardiac pacemaker  H/O mitral valve replacement  H/O aortic valve replacement      FAMILY HISTORY  non-contributory     SOCIAL HISTORY    Substance Use (x  ) never used  (  ) IVDU (  ) Other:  Tobacco Usage:  (   ) never smoked   (   ) former smoker   (   ) current smoker   Alcohol Usage: (   ) social  ( x  ) previous  daily use (   ) denies  Sexual History:       ROS  General: Denies rigors, nightsweats  HEENT: Denies headache, rhinorrhea, sore throat, eye pain  CV: Denies CP, palpitations  PULM: Denies wheezing, hemoptysis  GI: Denies hematemesis, hematochezia, melena  : Denies discharge, hematuria  MSK: Denies arthralgias, myalgias  SKIN: Denies rash, lesions  NEURO: Denies paresthesias, weakness  PSYCH: Denies depression, anxiety    VITALS:  T(F): 97.7, Max: 97.8 (-19 @ 20:48)  HR: 70  BP: 132/65  RR: 18Vital Signs Last 24 Hrs  T(C): 36.5 (22 Dec 2019 06:12), Max: 36.6 (21 Dec 2019 20:48)  T(F): 97.7 (22 Dec 2019 06:12), Max: 97.8 (21 Dec 2019 20:48)  HR: 70 (22 Dec 2019 07:30) (67 - 92)  BP: 132/65 (22 Dec 2019 06:12) (103/60 - 137/59)  BP(mean): --  RR: 18 (22 Dec 2019 06:12) (18 - 18)  SpO2: 97% (22 Dec 2019 07:30) (97% - 100%)    PHYSICAL EXAM:  Gen: NAD, resting in bed  HEENT: Normocephalic, atraumatic  Neck: supple, no lymphadenopathy  CV: Regular rate & regular rhythm  Lungs: decreased BS at bases, no fremitus  Abdomen: Soft, BS present  Ext: Warm, well perfused  Neuro: non focal, awake  Skin: no rash, no erythema  Lines: no phlebitis    TESTS & MEASUREMENTS:                        10.1   5.80  )-----------( 120      ( 22 Dec 2019 07:13 )             32.3         134<L>  |  89<L>  |  77<HH>  ----------------------------<  123<H>  4.7   |  25  |  8.7<HH>    Ca    8.5      22 Dec 2019 07:13  Phos  6.7       Mg     2.4         TPro  7.1  /  Alb  3.7  /  TBili  3.7<H>  /  DBili  3.0<H>  /  AST  35  /  ALT  76<H>  /  AlkPhos  745<H>      eGFR if Non African American: 6 mL/min/1.73M2 (19 @ 07:13)  eGFR if African American: 6 mL/min/1.73M2 (19 @ 07:13)    LIVER FUNCTIONS - ( 22 Dec 2019 07:13 )  Alb: 3.7 g/dL / Pro: 7.1 g/dL / ALK PHOS: 745 U/L / ALT: 76 U/L / AST: 35 U/L / GGT: x           Urinalysis Basic - ( 20 Dec 2019 22:55 )    Color: Amy / Appearance: Clear / S.016 / pH: x  Gluc: x / Ketone: Negative  / Bili: Small / Urobili: 3 mg/dL   Blood: x / Protein: 30 mg/dL / Nitrite: Negative   Leuk Esterase: Moderate / RBC: 4 /HPF / WBC 16 /HPF   Sq Epi: x / Non Sq Epi: 2 /HPF / Bacteria: Negative        Culture - Blood (collected 19 @ 21:00)  Source: .Blood Blood  Gram Stain (19 @ 14:18):    Growth in anaerobic bottle: Gram Negative Rods  Preliminary Report (19 @ 14:18):    Growth in anaerobic bottle: Gram Negative Rods    Culture - Blood (collected 19 @ 21:00)  Source: .Blood Blood  Gram Stain (19 @ 13:33):    Growth in anaerobic bottle: Gram Negative Rods  Preliminary Report (19 @ 13:33):    Growth in anaerobic bottle: Gram Negative Rods    "Due to technical problems, Proteus sp. will Not be reported as part of    the BCID panel until further notice"    ***Blood Panel PCR results on this specimen are available    approximately 3 hours after the Gram stain result.***    Gram stain, PCR, and/or culture results may not always    correspond due to difference in methodologies.    ************************************************************    This PCR assay was performed using [employer].    The following targets are tested for: Enterococcus,    vancomycin resistant enterococci, Listeria monocytogenes,    coagulase negative staphylococci, S. aureus,    methicillin resistant S. aureus, Streptococcus agalactiae    (Group B), S. pneumoniae, S. pyogenes (Group A),    Acinetobacter baumannii, Enterobacter cloacae, E. coli,    Klebsiella oxytoca, K. pneumoniae, Proteus sp.,    Serratia marcescens, Haemophilus influenzae,    Neisseria meningitidis, Pseudomonas aeruginosa, Candida    albicans, C. glabrata, C krusei, C parapsilosis,    C. tropicalis and the KPC resistance gene.  Organism: Blood Culture PCR (19 @ 15:34)  Organism: Blood Culture PCR (19 @ 15:34)      -  Klebsiella pneumoniae: Detec      Method Type: PCR        Blood Gas Venous - Lactate: 1.9 mmoL/L (19 @ 21:17)      INFECTIOUS DISEASES TESTING  Hepatitis C Virus Interpretation: Nonreact (19 @ 05:48)  Hepatitis B Surface Antigen: Nonreact (19 @ 05:48)  Hepatitis C Virus Interpretation: Nonreact (19 @ 05:48)      RADIOLOGY & ADDITIONAL TESTS:  I have personally reviewed the last Chest xray  CXR  Xray Chest 1 View AP/PA:   EXAM:  XR CHEST FRONTAL 1V            PROCEDURE DATE:  2019            INTERPRETATION:  Clinical History / Reason for exam: Chest pain.    Comparison : None.    Technique/Positioning: Frontal view of the chest.    Findings:    Support devices: Left-sided pacer with leads projecting over the right atrium and right ventricle.    Cardiac/mediastinum/hilum: Cardiomegaly. Post median sternotomy and CABG.    Lung parenchyma/Pleura: Within normal limits.    Skeleton/soft tissues: Surgical clips overlying the right scapula.    Impression:    1. No radiographic evidence of acute cardiopulmonary disease.    2. Cardiomegaly.                  ANTONIO CORNEJO M.D., ATTENDING RADIOLOGIST  This document has been electronically signed. Dec 21 2019 11:53AM             (19 @ 20:56)      CT  CT Abdomen and Pelvis w/ IV Cont:   EXAM:  CT ABDOMEN AND PELVIS IC            PROCEDURE DATE:  2019            INTERPRETATION:  CLINICAL STATEMENT: Elevated LFTs      TECHNIQUE: Contiguous CT images were obtained of the abdomen and pelvis.  Intravenous Contrast:  Optiray 320 intravenous contrast administered.    Oral contrast was not administered.      COMPARISON:  None    FINDINGS:    LOWER CHEST: Bibasilar dependent subsegmental atelectasis. Cardiomegaly with partially imaged pacer wires.    LIVER: Limited evaluation given phase of contrast, however slightly nodular contour appearance to the left hepatic lobe.    SPLEEN: Splenomegaly to 13.5 cm craniocaudad      PANCREAS: Unremarkable.    GALLBLADDER AND BILIARY TREE: Contracted gallbladder with cholelithiasis. Nonspecific gallbladder wall edema which can be seen in the setting of liver disease. No biliary ductal dilatation.    ADRENALS: Unremarkable.    KIDNEYS: Symmetric pattern of renal enhancement. No hydronephrosis. 10 cm right lower pole cyst. 4 cm left renal exophytic hypodensity likely reflects a cyst, however limited evaluation for Hounsfield measurements given adjacent streak artifact from left gantry. Indeterminate 2 cm right upper pole exophytic hyperdensity. Additional hypodensities too small to characterize.    LYMPH NODES: There are no enlarged abdominal or pelvic lymph nodes.    VASCULATURE: The abdominal aorta is normal in caliber and demonstrates atherosclerotic changes.    BOWEL: No bowel obstruction or bowel wall thickening. Unremarkable appendix    PERITONEUM/RETROPERITONEUM/MESENTERY: Small volume abdominal pelvic ascites. No pneumoperitoneum    PELVIC VISCERA: Unremarkable.    BONES AND SOFT TISSUES: Severe degenerative changes of the left hip. Additional degenerative changes ofthe right hip, bilateral sacroiliac joints and spine.     IMPRESSION:    Slightly nodular hepatic contour. Given the associated splenomegaly and small volume abdominal pelvic ascites, cirrhosis and portal hypertension is favored.    Cholelithiasis.    Indeterminate 2 cm right upper pole hyperdensity. Given partially imaged pacer wires, recommend nonemergent renal protocol CT for further evaluation.                  TISHA FISCHER M.D., ATTENDING RADIOLOGIST  This document has been electronically signed. Dec 21 2019  1:42AM             (19 @ 01:15)      CARDIOLOGY TESTING  Transthoracic Echocardiogram:    EXAM:  2-D ECHO (TTE) COMPLETE        PROCEDURE DATE:  2019      INTERPRETATION:  REPORT:    TRANSTHORACIC ECHOCARDIOGRAM REPORT         Patient Name:   MARILYN COOK Accession #: [id number]  Medical Rec #:  [id number]           Height:   70.0 in 177.8 cm  YOB: 1950           Weight:      280.0 lb 127.01 kg  Patient Age:    69 years            BSA:         2.41 m²  Patient Gender: M                   BP:          116/65 mmHg       Date of Exam:        2019 2:01:55 PM  Referring Physician: MUSTAPHA DAMON  Sonographer:         Pastora Barry    Procedure:     2D Echo/Doppler/Color Doppler Complete.  Indications:   I42.9 - Cardiomyopathy, unspecified  Diagnosis:     Cardiomyopathy, unspecified - I42.9  Study Details: Technically suboptimal study. Study quality was adversely                 affected due to body habitus, patient obesity, arrhythmia and                 lung interference.         Summary:   1. Left ventricular ejection fraction, byvisual estimation, is <20%.   2. Technically suboptimal study.   3. Severely decreased global left ventricular systolic function.   4. Moderately enlarged right ventricle.   5. Mildly dilated left atrium.   6. Mildly enlarged right atrium.   7. Severe mitral annular calcification.   8. There appears to be a bioprosthetic valve in mitral position, probably normal function.   9. AV is not visualized. Peak PG 24 mmHg, c/w probably mild AS.    PHYSICIAN INTERPRETATION:  Left Ventricle: The left ventricular internal cavity size is moderately increased. Left ventricular wall thickness is normal. Global LV systolic function was severely decreased. Left ventricular ejection fraction, by visual estimation, is <20%.  Right Ventricle: The right ventricular size is moderately enlarged. RV systolic function is mildly reduced.  Left Atrium: Mildly dilated left atrium.  Right Atrium: Mildly enlarged right atrium.  Pericardium: There is no evidence of pericardial effusion.  Mitral Valve: There is severe mitral annular calcification. No evidence of mitral valve regurgitation is seen. There appears to be a bioprosthetic valve in mitral position, probably normal function.  Tricuspid Valve: The tricuspid valve is normal in structure. Mild tricuspid regurgitation is visualized.  Aortic Valve: No evidence of aortic valve regurgitation is seen. AV is not visualized. Peak PG 24 mmHg, c/w probably mild AS.  Aorta: The aorta was not well visualized.  Pulmonary Artery: The pulmonary artery is not well seen.  Additional Comments: A pacer wire is visualized in the right atrium and right ventricle.       2D AND M-MODE MEASUREMENTS (normal ranges within parentheses):  Left                  Normal   Aorta/Left             Normal  Ventricle:  Atrium:  IVSd (2D):  1.53 cm  (0.7-1.1) AoV Cusp       1.72  (1.5-2.6)  LVPWd (2D): 1.25 cm  (0.7-1.1) Separation:     cm  LVIDd (2D): 6.98 cm  (3.4-5.7) Left Atrium    5.44  (1.9-4.0)  LVIDs (2D): 6.50 cm            (Mmode):        cm  LV FS (2D): 6.9 %    (>25%)   LA Volume      34.0  IVSd        1.15 cm  (0.7-1.1) Index         ml/m²  (Mmode):                       Right  LVPWd       1.68 cm  (0.7-1.1) Ventricle:  (Mmode):                       RVd (2D):      3.89 cm  LVIDd       6.69 cm  (3.4-5.7)  (Mmode):  LVIDs       6.02 cm  (Mmode):  LV FS        9.9 %    (>25%)  (Mmode):  Relative      0.36    (<0.42)  Wall  Thickness  Rel. Wall     0.50    (<0.42)  Thickness  Mm  LV Mass      195.3  Index:        g/m²  Mmode    SPECTRAL DOPPLER ANALYSIS:  LV DIASTOLIC FUNCTION:  MV Peak E: 1.70 m/s Decel Time: 244 msec  MV Peak A: 1.30 m/s  E/A Ratio: 1.30    Aortic Valve:  AoV VMax:    2.46 m/s  AoV Area, Vmax: 1.42 cm² Vmax Indx: 0.59 cm²/m²  AoV Pk Grad: 24.2 mmHg    LVOT Vmax: 1.06 m/s  LVOT VTI:  0.28 m  LVOT Diam: 2.05 cm    Mitral Valve:  MV P1/2 Time: 70.69 msec  MV Area, PHT: 3.11 cm²    Tricuspid Valve and PA/RV Systolic Pressure: TR Max Velocity: 2.42 m/s RA Pressure: 8 mmHg RVSP/PASP: 31.5 mmHg    Pulmonic Valve:  PV Max Velocity: 0.97 m/s PV Max PG: 3.8 mmHg PV Mean PG:       [id number] Meaghan Ricci M.D., Electronically signed on 2019 at 4:54:20 PM              *** Final ***                    MEAGHAN RICCI M.D., ATTENDING CARDIOLOGIST  This document has been electronically signed. Dec 21 2019  2:01PM             (19 @ 14:01)  12 Lead ECG:   Ventricular Rate 91 BPM    Atrial Rate 91 BPM    QRS Duration 168 ms    Q-T Interval 434 ms    QTC Calculation(Bezet) 533 ms    R Axis -2 degrees    T Axis 98 degrees    Diagnosis Line Idioventricular rhythm  Non-specific intra-ventricular conduction block  Inferior infarct , age undetermined  Abnormal ECG    Confirmed by LAURENCE NAPIER MD ([id number]) on 2019 11:06:01 PM (19 @ 21:39)      MEDICATIONS  atorvastatin 20  calcium acetate 667  finasteride 5  heparin  Injectable 5000  influenza   Vaccine 0.5  meropenem  IVPB 500  pantoprazole    Tablet 40      ANTIBIOTICS:  meropenem  IVPB 500 milliGRAM(s) IV Intermittent every 24 hours      ALLERGIES:  No Known Allergies JOEY MARILYN  69y, Male  Allergy: No Known Allergies      CHIEF COMPLAINT: Weakness (22 Dec 2019 10:06)      HPI:  Patient is a 68 yo [place] speaking male with PMH of Hypertension, ESRD on HD T//S, Atrial fibrillation not on AC due to GI bleed, DM, MARLI on O2 3l and BiPAP at night, rheumatic heart disease s/p Aortic and Mitral valve replacement present with chief complaint of fever and generalized weakness for 2 days. He was having abdominal pain in right upper quadrant region, intermittent, associated with severe nausea yesterday. Also c/o subjective fevers at home. He didn't go to his HD yesterday because of the symptoms. Patient also has shortness of breath on exertion at baseline and worsening lower extremity edema in the last few weeks. No c/o vomiting, diarrhea, constipation, hematemesis, hematochezia. No recent weight loss. No history of gall stones, no family history of malignancy. Patient was a smoker since age of 11, 1.5pc/day and stopped 4yrs ago. He was also a chronic alcoholic, stopped in  after his valve replacement surgery. He had EGD and colonoscopy in 2015 due to GI bleeding which as per the daughter was normal. Patient was never diagnosed with Cirrhosis in the past.  In ED, /68 , HR 86, Platelet 127, bilirubin 7, alkaline phosphatase 714, AST 83, , troponin 0.14, BNP 30607. CT abdomen showed Slightly nodular hepatic contour with associated splenomegaly and small volume abdominal pelvic ascites. US abdomen showed cholelithiasis with contracted gallbladder wall thickening, nodular liver. (21 Dec 2019 02:23)      INFECTIOUS DISEASE HISTORY:   bcx kleb   PAST MEDICAL & SURGICAL HISTORY:  ESRD (end stage renal disease) on dialysis  Diabetes mellitus  HTN (hypertension)  AV fistula: right upper arm  H/O cardiac pacemaker  H/O mitral valve replacement  H/O aortic valve replacement      FAMILY HISTORY  non-contributory     SOCIAL HISTORY    Substance Use (x  ) never used  (  ) IVDU (  ) Other:  Tobacco Usage:  (   ) never smoked   (   ) former smoker   (   ) current smoker   Alcohol Usage: (   ) social  ( x  ) previous  daily use (   ) denies  Sexual History:       ROS  General: Denies rigors, nightsweats  HEENT: Denies headache, rhinorrhea, sore throat, eye pain  CV: Denies CP, palpitations  PULM: Denies wheezing, hemoptysis  GI: Denies hematemesis, hematochezia, melena  : Denies discharge, hematuria  MSK: Denies arthralgias, myalgias  SKIN: Denies rash, lesions  NEURO: Denies paresthesias, weakness  PSYCH: Denies depression, anxiety    VITALS:  T(F): 97.7, Max: 97.8 (-19 @ 20:48)  HR: 70  BP: 132/65  RR: 18Vital Signs Last 24 Hrs  T(C): 36.5 (22 Dec 2019 06:12), Max: 36.6 (21 Dec 2019 20:48)  T(F): 97.7 (22 Dec 2019 06:12), Max: 97.8 (21 Dec 2019 20:48)  HR: 70 (22 Dec 2019 07:30) (67 - 92)  BP: 132/65 (22 Dec 2019 06:12) (103/60 - 137/59)  BP(mean): --  RR: 18 (22 Dec 2019 06:12) (18 - 18)  SpO2: 97% (22 Dec 2019 07:30) (97% - 100%)    PHYSICAL EXAM:  Gen: NAD, resting in bed  HEENT: Normocephalic, atraumatic  Neck: supple, no lymphadenopathy  CV: Regular rate & regular rhythm  Lungs: decreased BS at bases, no fremitus  Abdomen: Distended, no RUQ TTP  Ext: Warm, well perfused, 1+ edema, RUE AVF  Neuro: non focal, awake  Skin: no rash, no erythema  Lines: no phlebitis    TESTS & MEASUREMENTS:                        10.1   5.80  )-----------( 120      ( 22 Dec 2019 07:13 )             32.3         134<L>  |  89<L>  |  77<HH>  ----------------------------<  123<H>  4.7   |  25  |  8.7<HH>    Ca    8.5      22 Dec 2019 07:13  Phos  6.7       Mg     2.4         TPro  7.1  /  Alb  3.7  /  TBili  3.7<H>  /  DBili  3.0<H>  /  AST  35  /  ALT  76<H>  /  AlkPhos  745<H>      eGFR if Non African American: 6 mL/min/1.73M2 (19 @ 07:13)  eGFR if African American: 6 mL/min/1.73M2 (19 @ 07:13)    LIVER FUNCTIONS - ( 22 Dec 2019 07:13 )  Alb: 3.7 g/dL / Pro: 7.1 g/dL / ALK PHOS: 745 U/L / ALT: 76 U/L / AST: 35 U/L / GGT: x           Urinalysis Basic - ( 20 Dec 2019 22:55 )    Color: Amy / Appearance: Clear / S.016 / pH: x  Gluc: x / Ketone: Negative  / Bili: Small / Urobili: 3 mg/dL   Blood: x / Protein: 30 mg/dL / Nitrite: Negative   Leuk Esterase: Moderate / RBC: 4 /HPF / WBC 16 /HPF   Sq Epi: x / Non Sq Epi: 2 /HPF / Bacteria: Negative        Culture - Blood (collected 19 @ 21:00)  Source: .Blood Blood  Gram Stain (19 @ 14:18):    Growth in anaerobic bottle: Gram Negative Rods  Preliminary Report (19 @ 14:18):    Growth in anaerobic bottle: Gram Negative Rods    Culture - Blood (collected 19 @ 21:00)  Source: .Blood Blood  Gram Stain (19 @ 13:33):    Growth in anaerobic bottle: Gram Negative Rods  Preliminary Report (19 @ 13:33):    Growth in anaerobic bottle: Gram Negative Rods    "Due to technical problems, Proteus sp. will Not be reported as part of    the BCID panel until further notice"    ***Blood Panel PCR results on this specimen are available    approximately 3 hours after the Gram stain result.***    Gram stain, PCR, and/or culture results may not always    correspond due to difference in methodologies.    ************************************************************    This PCR assay was performed using [employer].    The following targets are tested for: Enterococcus,    vancomycin resistant enterococci, Listeria monocytogenes,    coagulase negative staphylococci, S. aureus,    methicillin resistant S. aureus, Streptococcus agalactiae    (Group B), S. pneumoniae, S. pyogenes (Group A),    Acinetobacter baumannii, Enterobacter cloacae, E. coli,    Klebsiella oxytoca, K. pneumoniae, Proteus sp.,    Serratia marcescens, Haemophilus influenzae,    Neisseria meningitidis, Pseudomonas aeruginosa, Candida    albicans, C. glabrata, C krusei, C parapsilosis,    C. tropicalis and the KPC resistance gene.  Organism: Blood Culture PCR (19 @ 15:34)  Organism: Blood Culture PCR (19 @ 15:34)      -  Klebsiella pneumoniae: Detec      Method Type: PCR        Blood Gas Venous - Lactate: 1.9 mmoL/L (19 @ 21:17)      INFECTIOUS DISEASES TESTING  Hepatitis C Virus Interpretation: Nonreact (19 @ 05:48)  Hepatitis B Surface Antigen: Nonreact (19 @ 05:48)  Hepatitis C Virus Interpretation: Nonreact (19 @ 05:48)      RADIOLOGY & ADDITIONAL TESTS:  I have personally reviewed the last Chest xray  CXR  Xray Chest 1 View AP/PA:   EXAM:  XR CHEST FRONTAL 1V            PROCEDURE DATE:  2019            INTERPRETATION:  Clinical History / Reason for exam: Chest pain.    Comparison : None.    Technique/Positioning: Frontal view of the chest.    Findings:    Support devices: Left-sided pacer with leads projecting over the right atrium and right ventricle.    Cardiac/mediastinum/hilum: Cardiomegaly. Post median sternotomy and CABG.    Lung parenchyma/Pleura: Within normal limits.    Skeleton/soft tissues: Surgical clips overlying the right scapula.    Impression:    1. No radiographic evidence of acute cardiopulmonary disease.    2. Cardiomegaly.                  ANTONIO CORNEJO M.D., ATTENDING RADIOLOGIST  This document has been electronically signed. Dec 21 2019 11:53AM             (19 @ 20:56)      CT  CT Abdomen and Pelvis w/ IV Cont:   EXAM:  CT ABDOMEN AND PELVIS IC            PROCEDURE DATE:  2019            INTERPRETATION:  CLINICAL STATEMENT: Elevated LFTs      TECHNIQUE: Contiguous CT images were obtained of the abdomen and pelvis.  Intravenous Contrast:  Optiray 320 intravenous contrast administered.    Oral contrast was not administered.      COMPARISON:  None    FINDINGS:    LOWER CHEST: Bibasilar dependent subsegmental atelectasis. Cardiomegaly with partially imaged pacer wires.    LIVER: Limited evaluation given phase of contrast, however slightly nodular contour appearance to the left hepatic lobe.    SPLEEN: Splenomegaly to 13.5 cm craniocaudad      PANCREAS: Unremarkable.    GALLBLADDER AND BILIARY TREE: Contracted gallbladder with cholelithiasis. Nonspecific gallbladder wall edema which can be seen in the setting of liver disease. No biliary ductal dilatation.    ADRENALS: Unremarkable.    KIDNEYS: Symmetric pattern of renal enhancement. No hydronephrosis. 10 cm right lower pole cyst. 4 cm left renal exophytic hypodensity likely reflects a cyst, however limited evaluation for Hounsfield measurements given adjacent streak artifact from left gantry. Indeterminate 2 cm right upper pole exophytic hyperdensity. Additional hypodensities too small to characterize.    LYMPH NODES: There are no enlarged abdominal or pelvic lymph nodes.    VASCULATURE: The abdominal aorta is normal in caliber and demonstrates atherosclerotic changes.    BOWEL: No bowel obstruction or bowel wall thickening. Unremarkable appendix    PERITONEUM/RETROPERITONEUM/MESENTERY: Small volume abdominal pelvic ascites. No pneumoperitoneum    PELVIC VISCERA: Unremarkable.    BONES AND SOFT TISSUES: Severe degenerative changes of the left hip. Additional degenerative changes ofthe right hip, bilateral sacroiliac joints and spine.     IMPRESSION:    Slightly nodular hepatic contour. Given the associated splenomegaly and small volume abdominal pelvic ascites, cirrhosis and portal hypertension is favored.    Cholelithiasis.    Indeterminate 2 cm right upper pole hyperdensity. Given partially imaged pacer wires, recommend nonemergent renal protocol CT for further evaluation.                  TISHA FISCHER M.D., ATTENDING RADIOLOGIST  This document has been electronically signed. Dec 21 2019  1:42AM             (19 @ 01:15)      CARDIOLOGY TESTING  Transthoracic Echocardiogram:    EXAM:  2-D ECHO (TTE) COMPLETE        PROCEDURE DATE:  2019      INTERPRETATION:  REPORT:    TRANSTHORACIC ECHOCARDIOGRAM REPORT         Patient Name:   MARILYN COOK Accession #: [id number]  Medical Rec #:  [id number]           Height:   70.0 in 177.8 cm  YOB: 1950           Weight:      280.0 lb 127.01 kg  Patient Age:    69 years            BSA:         2.41 m²  Patient Gender: M                   BP:          116/65 mmHg       Date of Exam:        2019 2:01:55 PM  Referring Physician: [id number] DAMON  Sonographer:         Pastora Barry    Procedure:     2D Echo/Doppler/Color Doppler Complete.  Indications:   I42.9 - Cardiomyopathy, unspecified  Diagnosis:     Cardiomyopathy, unspecified - I42.9  Study Details: Technically suboptimal study. Study quality was adversely                 affected due to body habitus, patient obesity, arrhythmia and                 lung interference.         Summary:   1. Left ventricular ejection fraction, byvisual estimation, is <20%.   2. Technically suboptimal study.   3. Severely decreased global left ventricular systolic function.   4. Moderately enlarged right ventricle.   5. Mildly dilated left atrium.   6. Mildly enlarged right atrium.   7. Severe mitral annular calcification.   8. There appears to be a bioprosthetic valve in mitral position, probably normal function.   9. AV is not visualized. Peak PG 24 mmHg, c/w probably mild AS.    PHYSICIAN INTERPRETATION:  Left Ventricle: The left ventricular internal cavity size is moderately increased. Left ventricular wall thickness is normal. Global LV systolic function was severely decreased. Left ventricular ejection fraction, by visual estimation, is <20%.  Right Ventricle: The right ventricular size is moderately enlarged. RV systolic function is mildly reduced.  Left Atrium: Mildly dilated left atrium.  Right Atrium: Mildly enlarged right atrium.  Pericardium: There is no evidence of pericardial effusion.  Mitral Valve: There is severe mitral annular calcification. No evidence of mitral valve regurgitation is seen. There appears to be a bioprosthetic valve in mitral position, probably normal function.  Tricuspid Valve: The tricuspid valve is normal in structure. Mild tricuspid regurgitation is visualized.  Aortic Valve: No evidence of aortic valve regurgitation is seen. AV is not visualized. Peak PG 24 mmHg, c/w probably mild AS.  Aorta: The aorta was not well visualized.  Pulmonary Artery: The pulmonary artery is not well seen.  Additional Comments: A pacer wire is visualized in the right atrium and right ventricle.       2D AND M-MODE MEASUREMENTS (normal ranges within parentheses):  Left                  Normal   Aorta/Left             Normal  Ventricle:  Atrium:  IVSd (2D):  1.53 cm  (0.7-1.1) AoV Cusp       1.72  (1.5-2.6)  LVPWd (2D): 1.25 cm  (0.7-1.1) Separation:     cm  LVIDd (2D): 6.98 cm  (3.4-5.7) Left Atrium    5.44  (1.9-4.0)  LVIDs (2D): 6.50 cm            (Mmode):        cm  LV FS (2D): 6.9 %    (>25%)   LA Volume      34.0  IVSd        1.15 cm  (0.7-1.1) Index         ml/m²  (Mmode):                       Right  LVPWd       1.68 cm  (0.7-1.1) Ventricle:  (Mmode):                       RVd (2D):      3.89 cm  LVIDd       6.69 cm  (3.4-5.7)  (Mmode):  LVIDs       6.02 cm  (Mmode):  LV FS        9.9 %    (>25%)  (Mmode):  Relative      0.36    (<0.42)  Wall  Thickness  Rel. Wall     0.50    (<0.42)  Thickness  Mm  LV Mass      195.3  Index:        g/m²  Mmode    SPECTRAL DOPPLER ANALYSIS:  LV DIASTOLIC FUNCTION:  MV Peak E: 1.70 m/s Decel Time: 244 msec  MV Peak A: 1.30 m/s  E/A Ratio: 1.30    Aortic Valve:  AoV VMax:    2.46 m/s  AoV Area, Vmax: 1.42 cm² Vmax Indx: 0.59 cm²/m²  AoV Pk Grad: 24.2 mmHg    LVOT Vmax: 1.06 m/s  LVOT VTI:  0.28 m  LVOT Diam: 2.05 cm    Mitral Valve:  MV P1/2 Time: 70.69 msec  MV Area, PHT: 3.11 cm²    Tricuspid Valve and PA/RV Systolic Pressure: TR Max Velocity: 2.42 m/s RA Pressure: 8 mmHg RVSP/PASP: 31.5 mmHg    Pulmonic Valve:  PV Max Velocity: 0.97 m/s PV Max PG: 3.8 mmHg PV Mean PG:       [id number] Meaghan Ricci M.D., Electronically signed on 2019 at 4:54:20 PM              *** Final ***                    MEAGHAN RICCI M.D., ATTENDING CARDIOLOGIST  This document has been electronically signed. Dec 21 2019  2:01PM             (19 @ 14:01)  12 Lead ECG:   Ventricular Rate 91 BPM    Atrial Rate 91 BPM    QRS Duration 168 ms    Q-T Interval 434 ms    QTC Calculation(Bezet) 533 ms    R Axis -2 degrees    T Axis 98 degrees    Diagnosis Line Idioventricular rhythm  Non-specific intra-ventricular conduction block  Inferior infarct , age undetermined  Abnormal ECG    Confirmed by LAURENCE NAPIER MD ([id number]) on 2019 11:06:01 PM (19 @ 21:39)      MEDICATIONS  atorvastatin 20  calcium acetate 667  finasteride 5  heparin  Injectable 5000  influenza   Vaccine 0.5  meropenem  IVPB 500  pantoprazole    Tablet 40      ANTIBIOTICS:  meropenem  IVPB 500 milliGRAM(s) IV Intermittent every 24 hours      ALLERGIES:  No Known Allergies

## 2019-12-22 NOTE — PROGRESS NOTE ADULT - SUBJECTIVE AND OBJECTIVE BOX
SUBJECTIVE:    Patient is a 69y old Male who presents with a chief complaint of Weakness (22 Dec 2019 09:56)    Currently admitted to medicine with the primary diagnosis of Elevated LFTs     Today is hospital day 1d. This morning he is resting comfortably in bed and reports no new issues or overnight events.     INTERVAL EVENTS: Likely ERCp by GI, HD tomorrow, Gram negative rods in blood, no active complaints no chest pain pr shortness of breath no abdominal complaints    PAST MEDICAL & SURGICAL HISTORY  ESRD (end stage renal disease) on dialysis  Diabetes mellitus  HTN (hypertension)  AV fistula: right upper arm  H/O cardiac pacemaker  H/O mitral valve replacement  H/O aortic valve replacement      ALLERGIES:  No Known Allergies    MEDICATIONS:  STANDING MEDICATIONS  atorvastatin 20 milliGRAM(s) Oral at bedtime  calcium acetate 667 milliGRAM(s) Oral four times a day with meals  finasteride 5 milliGRAM(s) Oral daily  heparin  Injectable 5000 Unit(s) SubCutaneous every 12 hours  influenza   Vaccine 0.5 milliLiter(s) IntraMuscular once  meropenem  IVPB 500 milliGRAM(s) IV Intermittent every 24 hours  pantoprazole    Tablet 40 milliGRAM(s) Oral before breakfast    PRN MEDICATIONS    VITALS:   T(F): 97.7  HR: 70  BP: 132/65  RR: 18  SpO2: 97%    LABS:                        10.1   5.80  )-----------( 120      ( 22 Dec 2019 07:13 )             32.3         134<L>  |  89<L>  |  77<HH>  ----------------------------<  123<H>  4.7   |  25  |  8.7<HH>    Ca    8.5      22 Dec 2019 07:13  Phos  6.7       Mg     2.4         TPro  7.1  /  Alb  3.7  /  TBili  3.7<H>  /  DBili  3.0<H>  /  AST  35  /  ALT  76<H>  /  AlkPhos  745<H>      PT/INR - ( 22 Dec 2019 07:13 )   PT: 14.70 sec;   INR: 1.28 ratio         PTT - ( 22 Dec 2019 07:13 )  PTT:29.6 sec  Urinalysis Basic - ( 20 Dec 2019 22:55 )    Color: Amy / Appearance: Clear / S.016 / pH: x  Gluc: x / Ketone: Negative  / Bili: Small / Urobili: 3 mg/dL   Blood: x / Protein: 30 mg/dL / Nitrite: Negative   Leuk Esterase: Moderate / RBC: 4 /HPF / WBC 16 /HPF   Sq Epi: x / Non Sq Epi: 2 /HPF / Bacteria: Negative        Troponin T, Serum: 0.13 ng/mL <HH> (19 @ 07:13)      Culture - Blood (collected 20 Dec 2019 21:00)  Source: .Blood Blood  Gram Stain (21 Dec 2019 14:18):    Growth in anaerobic bottle: Gram Negative Rods  Preliminary Report (21 Dec 2019 14:18):    Growth in anaerobic bottle: Gram Negative Rods    Culture - Blood (collected 20 Dec 2019 21:00)  Source: .Blood Blood  Gram Stain (21 Dec 2019 13:33):    Growth in anaerobic bottle: Gram Negative Rods  Preliminary Report (21 Dec 2019 13:33):    Growth in anaerobic bottle: Gram Negative Rods    "Due to technical problems, Proteus sp. will Not be reported as part of    the BCID panel until further notice"    ***Blood Panel PCR results on this specimen are available    approximately 3 hours after the Gram stain result.***    Gram stain, PCR, and/or culture results may not always    correspond due to difference in methodologies.    ************************************************************    This PCR assay was performed using [employer].    The following targets are tested for: Enterococcus,    vancomycin resistant enterococci, Listeria monocytogenes,    coagulase negative staphylococci, S. aureus,    methicillin resistant S. aureus, Streptococcus agalactiae    (Group B), S. pneumoniae, S. pyogenes (Group A),    Acinetobacter baumannii, Enterobacter cloacae, E. coli,    Klebsiella oxytoca, K. pneumoniae, Proteus sp.,    Serratia marcescens, Haemophilus influenzae,    Neisseria meningitidis, Pseudomonas aeruginosa, Candida    albicans, C. glabrata, C krusei, C parapsilosis,    C. tropicalis and the KPC resistance gene.  Organism: Blood Culture PCR (21 Dec 2019 15:34)  Organism: Blood Culture PCR (21 Dec 2019 15:34)      CARDIAC MARKERS ( 22 Dec 2019 07:13 )  x     / 0.13 ng/mL / x     / x     / x      CARDIAC MARKERS ( 20 Dec 2019 21:00 )  x     / 0.14 ng/mL / x     / x     / x          RADIOLOGY:    PHYSICAL EXAM:  GEN: No acute distress  PULM/CHEST: Clear to auscultation bilaterally, no rales, rhonchi or wheezes   CVS: Regular rate and rhythm, S1-S2, no murmurs  ABD: Soft, non-tender, non-distended, +BS  EXT: No edema  NEURO: AAOx3    Nuñez Catheter: SUBJECTIVE:    Patient is a 69y old Male who presents with a chief complaint of Weakness (22 Dec 2019 09:56)    Currently admitted to medicine with the primary diagnosis of Elevated LFTs     Today is hospital day 1d. This morning he is resting comfortably in bed and reports no new issues or overnight events.     INTERVAL EVENTS: Likely ERCp by GI, HD tomorrow, Gram negative rods in blood, no active complaints no chest pain pr shortness of breath no abdominal complaints    PAST MEDICAL & SURGICAL HISTORY  ESRD (end stage renal disease) on dialysis  Diabetes mellitus  HTN (hypertension)  AV fistula: right upper arm  H/O cardiac pacemaker  H/O mitral valve replacement  H/O aortic valve replacement      ALLERGIES:  No Known Allergies    MEDICATIONS:  STANDING MEDICATIONS  atorvastatin 20 milliGRAM(s) Oral at bedtime  calcium acetate 667 milliGRAM(s) Oral four times a day with meals  finasteride 5 milliGRAM(s) Oral daily  heparin  Injectable 5000 Unit(s) SubCutaneous every 12 hours  influenza   Vaccine 0.5 milliLiter(s) IntraMuscular once  meropenem  IVPB 500 milliGRAM(s) IV Intermittent every 24 hours  pantoprazole    Tablet 40 milliGRAM(s) Oral before breakfast    PRN MEDICATIONS    VITALS:   T(F): 97.7  HR: 70  BP: 132/65  RR: 18  SpO2: 97%    LABS:                        10.1   5.80  )-----------( 120      ( 22 Dec 2019 07:13 )             32.3         134<L>  |  89<L>  |  77<HH>  ----------------------------<  123<H>  4.7   |  25  |  8.7<HH>    Ca    8.5      22 Dec 2019 07:13  Phos  6.7       Mg     2.4         TPro  7.1  /  Alb  3.7  /  TBili  3.7<H>  /  DBili  3.0<H>  /  AST  35  /  ALT  76<H>  /  AlkPhos  745<H>      PT/INR - ( 22 Dec 2019 07:13 )   PT: 14.70 sec;   INR: 1.28 ratio         PTT - ( 22 Dec 2019 07:13 )  PTT:29.6 sec  Urinalysis Basic - ( 20 Dec 2019 22:55 )    Color: Amy / Appearance: Clear / S.016 / pH: x  Gluc: x / Ketone: Negative  / Bili: Small / Urobili: 3 mg/dL   Blood: x / Protein: 30 mg/dL / Nitrite: Negative   Leuk Esterase: Moderate / RBC: 4 /HPF / WBC 16 /HPF   Sq Epi: x / Non Sq Epi: 2 /HPF / Bacteria: Negative        Troponin T, Serum: 0.13 ng/mL <HH> (19 @ 07:13)      Culture - Blood (collected 20 Dec 2019 21:00)  Source: .Blood Blood  Gram Stain (21 Dec 2019 14:18):    Growth in anaerobic bottle: Gram Negative Rods  Preliminary Report (21 Dec 2019 14:18):    Growth in anaerobic bottle: Gram Negative Rods    Culture - Blood (collected 20 Dec 2019 21:00)  Source: .Blood Blood  Gram Stain (21 Dec 2019 13:33):    Growth in anaerobic bottle: Gram Negative Rods  Preliminary Report (21 Dec 2019 13:33):    Growth in anaerobic bottle: Gram Negative Rods    "Due to technical problems, Proteus sp. will Not be reported as part of    the BCID panel until further notice"    ***Blood Panel PCR results on this specimen are available    approximately 3 hours after the Gram stain result.***    Gram stain, PCR, and/or culture results may not always    correspond due to difference in methodologies.    ************************************************************    This PCR assay was performed using [employer].    The following targets are tested for: Enterococcus,    vancomycin resistant enterococci, Listeria monocytogenes,    coagulase negative staphylococci, S. aureus,    methicillin resistant S. aureus, Streptococcus agalactiae    (Group B), S. pneumoniae, S. pyogenes (Group A),    Acinetobacter baumannii, Enterobacter cloacae, E. coli,    Klebsiella oxytoca, K. pneumoniae, Proteus sp.,    Serratia marcescens, Haemophilus influenzae,    Neisseria meningitidis, Pseudomonas aeruginosa, Candida    albicans, C. glabrata, C krusei, C parapsilosis,    C. tropicalis and the KPC resistance gene.  Organism: Blood Culture PCR (21 Dec 2019 15:34)  Organism: Blood Culture PCR (21 Dec 2019 15:34)      CARDIAC MARKERS ( 22 Dec 2019 07:13 )  x     / 0.13 ng/mL / x     / x     / x      CARDIAC MARKERS ( 20 Dec 2019 21:00 )  x     / 0.14 ng/mL / x     / x     / x          RADIOLOGY:    PHYSICAL EXAM:  GEN: No acute distress/ obese  PULM/CHEST: Clear to auscultation bilaterally, no rales, rhonchi or wheezes   CVS: Regular rate and rhythm, S1-S2, no murmurs  ABD: sternal scar from previous surgery, Soft, mild tenderness right upper quadrant  EXT: skin tags on neck, bilateral pitting edema lower extremities  NEURO: AAOx3    Nuñez Catheter:

## 2019-12-22 NOTE — PROGRESS NOTE ADULT - ASSESSMENT
ESRD on HD   - s/p Hd Sat   - via RUE aneurysmal AVF   - TTS at [facility]   - missed last HD    - last HD on Tuesday  sepsis / fever due to bacteremia  GNR bacteremia   hyponatremia  hyperkalemia  afib not on AC due to GIB  rheumatic heart disease s/p AVR and MVR - bio  DM  MARLI on home O2 and bipap  cholelithiasis  cirrhosis   splenomegaly / ascites  ex-etoh abuse  abnormal LFT's    plan:     next HD tomorrow (holiday schedule)  ID eval pending  MRCP per GI  right arm precautions   renal dose meropenum  f/u GI  f/u surgery  low K+ renal diet  phoslo with meals  f/u cultures id and sensitivity  f/u urine cultures  full code  d/w son at bedside

## 2019-12-22 NOTE — PROGRESS NOTE ADULT - ASSESSMENT
1. RUQ abdominal pain: Resolved  2. Transaminitis: Likely due to alcoholic liver cirrhosis. CLD workup in progress, viral hepatitis panel negative. GI/Surgery notes  appreciated. No intervention warranted at this time  3. Gram negative bacteremia: IV antibiotics per ID. Follow up cultures and sensitivity  4. ESRD: HD per Renal  5. PAF: Not candidate for anticoagulation due to bleeding risk

## 2019-12-22 NOTE — PROGRESS NOTE ADULT - ASSESSMENT
Patient is a 70 yo Maori speaking male with PMH of Hypertension, ESRD on HD T/Th/S, Atrial fibrillation not on AC due to GI bleed, DM, MARLI on O2 3l and BiPAP at night, rheumatic heart disease s/p Aortic and Mitral valve replacement present with chief complaint of fever and generalized weakness for 2 days. Platelet 127, bilirubin 7, alkaline phosphatase 714, AST 83, , troponin 0.14, BNP 84211. CT abdomen showed Slightly nodular hepatic contour with associated splenomegaly and small volume abdominal pelvic ascites.     Interval pt feels weel has no more abdominal pain  ECho EF less than 20%    #Cholelithiasis  -Abdominal, currently resolved, bilirubin 7, alkaline phosphatase 714  -US abdomen showed cholelithiasis with normal CBD   -Normal WBC count, no fevers  -Trend LFTs alk phos 745, ast/alt downtrended now less than 100  -Consulted surgery no intervention at this time -"pt doesnt have cholycystitis" ERCp per GI  Transaminitis: Likely due to alcoholic liver cirrhosis  GI consult- ultrasound duplex heaptic viens   -GI -will need out pt follow up for EGD      # Cirrhosis of liver  -CT abdomen showed Slightly nodular hepatic contour. Given the associated splenomegaly and small volume abdominal pelvic ascites, cirrhosis and portal hypertension is favored  -Patient was never diagnosed with cirrhosis   -MELD score 33  -EGD in 2015 for GI bleed showed no varices  -No ascites, change in mental status  viral hepatitis panel negative      Gram negative bacteremia: IV antibiotics per ID getting meropenum now. Follow up cultures and sensitivity    #DM  -Monitor FS and start on insulin if FS >180    #ESRD on HD   -Started on HD in 2015  -Started on T/Th/S  -Hd tomorrow, renal dose meropenum  -low k, renal diet    #MARLI  -on 3l NC and BiPAP overnight    #Diet: DASH/TLC/Carb consistent/Renal diet  #DVT ppx: heparin sq  #GI ppx: protonix  #Dispo: Acute

## 2019-12-22 NOTE — PROGRESS NOTE ADULT - SUBJECTIVE AND OBJECTIVE BOX
NEPHROLOGY FOLLOW UP NOTE    70 yo male with PMH of Hypertension, ESRD on HD T//S, Atrial fibrillation not on AC due to GI bleed, DM, MARLI on O2 3l and BiPAP at night, rheumatic heart disease s/p Aortic and Mitral valve replacement present with chief complaint of fever and generalized weakness for 2 days. He was having abdominal pain in right upper quadrant region, intermittent, associated with severe nausea yesterday. Also c/o subjective fevers at home. He didn't go to his HD yesterday because of the symptoms. Patient also has shortness of breath on exertion at baseline and worsening lower extremity edema in the last few weeks. No c/o vomiting, diarrhea, constipation, hematemesis, hematochezia. No recent weight loss. No history of gall stones, no family history of malignancy. Patient was a smoker since age of 11, 1.5pc/day and stopped 4yrs ago. He was also a chronic alcoholic, stopped in  after his valve replacement surgery. He had EGD and colonoscopy in 2015 due to GI bleeding which as per the daughter was normal. Patient was never diagnosed with Cirrhosis in the past.  In ED, /68 , HR 86, Platelet 127, bilirubin 7, alkaline phosphatase 714, AST 83, , troponin 0.14, BNP 20332. CT abdomen showed Slightly nodular hepatic contour with associated splenomegaly and small volume abdominal pelvic ascites. US abdomen showed cholelithiasis with contracted gallbladder wall thickening, nodular liver.    Pt had hd yesterday.  BP's stable.  No further fevers.  + blood cultures.  C/o abd pain and rumbling as well as cough with phlegm.  Eating ok.  No dyspnea.  Less swollen.  spoke to son at bedside.        advance care planning and advance care directives reviewed and discussed      PAST MEDICAL & SURGICAL HISTORY:  ESRD (end stage renal disease) on dialysis  Diabetes mellitus  HTN (hypertension)  AV fistula: right upper arm  H/O cardiac pacemaker  H/O mitral valve replacement  H/O aortic valve replacement    Allergies:  No Known Allergies    Home Medications Reviewed    SOCIAL HISTORY:  no current smoking, drugs or etoh (ex- etoh abuse and ex-smoker)  lives home     FAMILY HISTORY:  no pertinent family history in relatives per patient      REVIEW OF SYSTEMS:  CONSTITUTIONAL: No weakness,  + fevers or chills  EYES/ENT: No visual changes;  No vertigo or throat pain   NECK: No pain or stiffness  RESPIRATORY: No cough, wheezing, hemoptysis; No shortness of breath  CARDIOVASCULAR: No chest pain or palpitations.  GASTROINTESTINAL: No abdominal or epigastric pain. No nausea, vomiting, or hematemesis; No diarrhea or constipation. No melena or hematochezia. + abd distention  GENITOURINARY: No dysuria, frequency, foamy urine, urinary urgency, incontinence or hematuria  NEUROLOGICAL: No numbness or weakness  SKIN: No itching, burning, rashes, or lesions   VASCULAR: No bilateral lower extremity edema.   All other review of systems is negative unless indicated above.    PHYSICAL EXAM:  Constitutional: NAD  HEENT: anicteric sclera, oropharynx clear, MMM  Neck: No JVD  Respiratory: decreased bs b/l, no wheezes, rales or rhonchi  Cardiovascular: S1, S2, irreg  Gastrointestinal: BS+, soft, NT/ + abd distention  Extremities: No cyanosis or clubbing. + peripheral edema  Neurological: A/O x 3, no focal deficits  Psychiatric: Normal mood, normal affect  : No CVA tenderness. No wolf.   Skin: No rashes  [facility]ascular Access: [facility] Medications:   MEDICATIONS  (STANDING):  atorvastatin 20 milliGRAM(s) Oral at bedtime  calcium acetate 667 milliGRAM(s) Oral four times a day with meals  finasteride 5 milliGRAM(s) Oral daily  heparin  Injectable 5000 Unit(s) SubCutaneous every 12 hours  influenza   Vaccine 0.5 milliLiter(s) IntraMuscular once  meropenem  IVPB 500 milliGRAM(s) IV Intermittent every 24 hours  pantoprazole    Tablet 40 milliGRAM(s) Oral before breakfast        VITALS:  T(F): 97.7 (19 @ 06:12), Max: 97.8 (19 @ 20:48)  HR: 70 (19 @ 07:30)  BP: 132/65 (19 @ 06:12)  RR: 18 (19 @ 06:12)  SpO2: 97% (19 @ 07:30)  Wt(kg): --     @ 07:01  -   @ 07:00  --------------------------------------------------------  IN: 0 mL / OUT: 3000 mL / NET: -3000 mL      Height (cm): 182.9 ( @ 18:25)  Weight (kg): 122.8 ( @ 18:25)  BMI (kg/m2): 36.7 ( @ 18:25)  BSA (m2): 2.42 ( @ 18:25)    LABS:      134<L>  |  89<L>  |  77<HH>  ----------------------------<  123<H>  4.7   |  25  |  8.7<HH>    Ca    8.5      22 Dec 2019 07:13  Phos  6.7       Mg     2.4         TPro  7.1  /  Alb  3.7  /  TBili  3.7<H>  /  DBili  3.0<H>  /  AST  35  /  ALT  76<H>  /  AlkPhos  745<H>                            10.1   5.80  )-----------( 120      ( 22 Dec 2019 07:13 )             32.3       Urine Studies:  Urinalysis Basic - ( 20 Dec 2019 22:55 )    Color: Amy / Appearance: Clear / S.016 / pH:   Gluc:  / Ketone: Negative  / Bili: Small / Urobili: 3 mg/dL   Blood:  / Protein: 30 mg/dL / Nitrite: Negative   Leuk Esterase: Moderate / RBC: 4 /HPF / WBC 16 /HPF   Sq Epi:  / Non Sq Epi: 2 /HPF / Bacteria: Negative        RADIOLOGY & ADDITIONAL STUDIES:    abg note  blood cultures: GNR bacteremia  CT abd - reviewed  abd sono - noted  flu - neg

## 2019-12-22 NOTE — PROGRESS NOTE ADULT - ASSESSMENT
70 y/o male with PMHx/PSH of HTN, ESRD on HD, DM, Afib not on AC, aortic and mitral valve replacement presents to ED for complaint of fever, nausea, and abdominal distension x 3 days, improving. Gallstones on imaging, no leukocytosis, afebrile.    Plan:   - trend LFTs  - GI recs appreicated  - f/u nephro

## 2019-12-22 NOTE — PROGRESS NOTE ADULT - SUBJECTIVE AND OBJECTIVE BOX
GENERAL SURGERY PROGRESS NOTE     MARILYN COOK  69y  Male  Hospital day :1d  POD:  Procedure:   OVERNIGHT EVENTS: no acute events. S/p HD.     T(F): 97.8 (19 @ 20:48), Max: 97.8 (19 @ 20:48)  HR: 67 (19 @ 20:48) (67 - 88)  BP: 137/59 (19 @ 20:48) (103/60 - 137/59)  ABP: --  ABP(mean): --  RR: 18 (19 @ 20:48) (16 - 20)  SpO2: 100% (19 @ 20:20) (96% - 100%)    DIET/FLUIDS: calcium acetate 667 milliGRAM(s) Oral four times a day with meals      GI proph:  pantoprazole    Tablet 40 milliGRAM(s) Oral before breakfast    AC/ proph: heparin  Injectable 5000 Unit(s) SubCutaneous every 12 hours    ABx: meropenem  IVPB 500 milliGRAM(s) IV Intermittent every 24 hours      PHYSICAL EXAM:  GENERAL: NAD, well-appearing  CHEST/LUNG: Clear to auscultation bilaterally  HEART: Regular rate and rhythm  ABDOMEN: Soft, Nontender, Nondistended;   EXTREMITIES:  No clubbing, cyanosis, or edema      LABS  Labs:  CAPILLARY BLOOD GLUCOSE      POCT Blood Glucose.: 123 mg/dL (21 Dec 2019 21:45)  POCT Blood Glucose.: 128 mg/dL (21 Dec 2019 12:18)                          10.5   7.01  )-----------( 130      ( 21 Dec 2019 05:48 )             33.1             131<L>  |  85<L>  |  97<HH>  ----------------------------<  91  5.0   |  24  |  10.1<HH>      Calcium, Total Serum: 8.9 mg/dL (19 @ 05:48)      LFTs:             7.1  | 6.2  | 59       ------------------[673     ( 21 Dec 2019 05:48 )  4.0  | 5.4  | 105         Lipase:x      Amylase:x         Blood Gas Venous - Lactate: 1.9 mmoL/L (19 @ 21:17)      Coags:     16.00  ----< 1.40    ( 21 Dec 2019 05:48 )     30.7        CARDIAC MARKERS ( 20 Dec 2019 21:00 )  x     / 0.14 ng/mL / x     / x     / x          Serum Pro-Brain Natriuretic Peptide: 37434 pg/mL (19 @ 21:00)      Urinalysis Basic - ( 20 Dec 2019 22:55 )    Color: Amy / Appearance: Clear / S.016 / pH: x  Gluc: x / Ketone: Negative  / Bili: Small / Urobili: 3 mg/dL   Blood: x / Protein: 30 mg/dL / Nitrite: Negative   Leuk Esterase: Moderate / RBC: 4 /HPF / WBC 16 /HPF   Sq Epi: x / Non Sq Epi: 2 /HPF / Bacteria: Negative        Culture - Blood (collected 20 Dec 2019 21:00)  Source: .Blood Blood  Gram Stain (21 Dec 2019 14:18):    Growth in anaerobic bottle: Gram Negative Rods  Preliminary Report (21 Dec 2019 14:18):    Growth in anaerobic bottle: Gram Negative Rods    Culture - Blood (collected 20 Dec 2019 21:00)  Source: .Blood Blood  Gram Stain (21 Dec 2019 13:33):    Growth in anaerobic bottle: Gram Negative Rods  Preliminary Report (21 Dec 2019 13:33):    Growth in anaerobic bottle: Gram Negative Rods    "Due to technical problems, Proteus sp. will Not be reported as part of    the BCID panel until further notice"    ***Blood Panel PCR results on this specimen are available    approximately 3 hours after the Gram stain result.***    Gram stain, PCR, and/or culture results may not always    correspond due to difference in methodologies.    ************************************************************    This PCR assay was performed using [employer].    The following targets are tested for: Enterococcus,    vancomycin resistant enterococci, Listeria monocytogenes,    coagulase negative staphylococci, S. aureus,    methicillin resistant S. aureus, Streptococcus agalactiae    (Group B), S. pneumoniae, S. pyogenes (Group A),    Acinetobacter baumannii, Enterobacter cloacae, E. coli,    Klebsiella oxytoca, K. pneumoniae, Proteus sp.,    Serratia marcescens, Haemophilus influenzae,    Neisseria meningitidis, Pseudomonas aeruginosa, Candida    albicans, C. glabrata, C krusei, C parapsilosis,    C. tropicalis and the KPC resistance gene.  Organism: Blood Culture PCR (21 Dec 2019 15:34)  Organism: Blood Culture PCR (21 Dec 2019 15:34)          RADIOLOGY & ADDITIONAL TESTS:  < from: US Abdomen Limited (19 @ 02:07) >  IMPRESSION:    1.  Hepatomegaly by length with a slightly lobulated contour suggestive of cirrhosis.    2.  Cholelithiasis with contracted gallbladder wall thickening.    3.  Small volume ascites.    < end of copied text >    < from: CT Abdomen and Pelvis w/ IV Cont (19 @ 01:15) >    IMPRESSION:    Slightly nodular hepatic contour. Given the associated splenomegaly and small volume abdominal pelvic ascites, cirrhosis and portal hypertension is favored.    Cholelithiasis.    Indeterminate 2 cm right upper pole hyperdensity. Given partially imaged pacer wires, recommend nonemergent renal protocol CT for further evaluation.    < end of copied text >

## 2019-12-22 NOTE — CONSULT NOTE ADULT - ASSESSMENT
ASSESSMENT  70 yo Croatian speaking male with PMH of Hypertension, ESRD on HD T/Th/S, Atrial fibrillation not on AC due to GI bleed, DM, MARLI on O2 3l and BiPAP at night, rheumatic heart disease s/p Aortic and Mitral valve replacement, likely ETOH cirrhosis with portal HTN presents with chief complaint of fever and generalized weakness for 2 days.    IMPRESSION  #Klebsiella bacteremia suspect GI source, transaminitis and high tbili/Alk ph (resolving)?passed stone    CTAP cirrhosis    RUQ Cholelithiasis with contracted gallbladder wall thickening. small ascites. neg mason, CBD wnl.     12/20 BCX NG    UA WBC 16   #Transaminitis, cholestatic, resolving   #Atelectasis on CT  #ETOH cirrhosis with portal HTN    Hep B/C neg  #Morbid Obesity BMI (kg/m2): 36.7  #Elev trop/bnp    RECOMMENDATIONS  - F/u blood cultures, kleb sensitivities   - Continue sara 500mg q24h for now   - Check HIV ab/ag  - RUQ US with small ascites, likely no pocket on US but can assess for paracentesis    Spectra [id number] ASSESSMENT  68 yo Arabic speaking male with PMH of Hypertension, ESRD on HD T/Th/S, Atrial fibrillation not on AC due to GI bleed, DM, MARLI on O2 3l and BiPAP at night, rheumatic heart disease s/p Aortic and Mitral valve replacement, likely ETOH cirrhosis with portal HTN presents with chief complaint of fever and generalized weakness for 2 days.    IMPRESSION  #Klebsiella bacteremia suspect GI source, transaminitis and high tbili/Alk ph (resolving)?passed stone, cannot r/o SBP    CTAP cirrhosis    RUQ Cholelithiasis with contracted gallbladder wall thickening. small ascites. neg mason, CBD wnl.     12/20 BCX NG    UA WBC 16   #Transaminitis, cholestatic, resolving   #Atelectasis on CT  #ETOH cirrhosis with portal HTN    Hep B/C neg  #Morbid Obesity BMI (kg/m2): 36.7  #Elev trop/bnp  #ESRD on HD via AVF, clean    RECOMMENDATIONS  - F/u blood cultures, kleb sensitivities   - Continue sara 500mg q24h for now   - Check HIV ab/ag  - RUQ US with small ascites, likely no pocket on Bedside US would assess for paracentesis to r/o SBP    Spectra [phone]

## 2019-12-22 NOTE — PROGRESS NOTE ADULT - SUBJECTIVE AND OBJECTIVE BOX
MARILYN COOK  69y  Male      Patient is a 69y old  Male who presents with a chief complaint of Weakness (22 Dec 2019 09:04)      INTERVAL HPI/OVERNIGHT EVENTS: Feeling well, no further episodes of abdominal pain, afebrile      REVIEW OF SYSTEMS:  CONSTITUTIONAL: No fever, weight loss, or fatigue  EYES: No eye pain, visual disturbances, or discharge  ENMT:  No difficulty hearing, tinnitus, vertigo; No sinus or throat pain  NECK: No pain or stiffness  BREASTS: No pain, masses, or nipple discharge  RESPIRATORY: No cough, wheezing, chills or hemoptysis; No shortness of breath  CARDIOVASCULAR: No chest pain, palpitations, dizziness, or leg swelling  GASTROINTESTINAL: No abdominal or epigastric pain. No nausea, vomiting, or hematemesis; No diarrhea or constipation. No melena or hematochezia.  GENITOURINARY: No dysuria, frequency, hematuria, or incontinence  NEUROLOGICAL: No headaches, memory loss, loss of strength, numbness, or tremors  SKIN: No itching, burning, rashes, or lesions   LYMPH NODES: No enlarged glands  ENDOCRINE: No heat or cold intolerance; No hair loss  MUSCULOSKELETAL: No joint pain or swelling; No muscle, back, or extremity pain  PSYCHIATRIC: No depression, anxiety, mood swings, or difficulty sleeping  HEME/LYMPH: No easy bruising, or bleeding gums  ALLERY AND IMMUNOLOGIC: No hives or eczema    T(C): 36.5 (12-22-19 @ 06:12), Max: 36.6 (12-21-19 @ 20:48)  HR: 70 (12-22-19 @ 07:30) (67 - 92)  BP: 132/65 (12-22-19 @ 06:12) (103/60 - 137/59)  RR: 18 (12-22-19 @ 06:12) (18 - 18)  SpO2: 97% (12-22-19 @ 07:30) (97% - 100%)  Wt(kg): --Vital Signs Last 24 Hrs  T(C): 36.5 (22 Dec 2019 06:12), Max: 36.6 (21 Dec 2019 20:48)  T(F): 97.7 (22 Dec 2019 06:12), Max: 97.8 (21 Dec 2019 20:48)  HR: 70 (22 Dec 2019 07:30) (67 - 92)  BP: 132/65 (22 Dec 2019 06:12) (103/60 - 137/59)  BP(mean): --  RR: 18 (22 Dec 2019 06:12) (18 - 18)  SpO2: 97% (22 Dec 2019 07:30) (97% - 100%)    PHYSICAL EXAM:  GENERAL: NAD, well-groomed, well-developed  HEAD:  Atraumatic, Normocephalic  EYES: EOMI, PERRLA, conjunctiva and sclera clear  ENMT: No tonsillar erythema, exudates, or enlargement; Moist mucous membranes, Good dentition, No lesions  NECK: Supple, No JVD, Normal thyroid  NERVOUS SYSTEM:  Alert & Oriented X3, Good concentration; Motor Strength 5/5 B/L upper and lower extremities; DTRs 2+ intact and symmetric  CHEST/LUNG: Clear to percussion bilaterally; No rales, rhonchi, wheezing, or rubs  HEART: Regular rate and rhythm; No murmurs, rubs, or gallops  ABDOMEN: Soft, Nontender, Nondistended; Bowel sounds present  EXTREMITIES:  2+ Peripheral Pulses, No clubbing, cyanosis, improved edema lower extremities  LYMPH: No lymphadenopathy noted  SKIN: No rashes or lesions    Consultant(s) Notes Reviewed:  [x ] YES  [ ] NO    Discussed with Consultants/Other Providers [ x] YES     LABS                         10.1   5.80  )-----------( 120      ( 22 Dec 2019 07:13 )             32.3   12-22    134<L>  |  89<L>  |  77<HH>  ----------------------------<  123<H>  4.7   |  25  |  8.7<HH>    Ca    8.5      22 Dec 2019 07:13  Phos  6.7     12-22  Mg     2.4     12-22    TPro  7.1  /  Alb  3.7  /  TBili  3.7<H>  /  DBili  3.0<H>  /  AST  35  /  ALT  76<H>  /  AlkPhos  745<H>  12-22        RADIOLOGY & ADDITIONAL TESTS:    Imaging Personally Reviewed:  [ ] YES  [ ] NO    MEDICATIONS  (STANDING):  atorvastatin 20 milliGRAM(s) Oral at bedtime  calcium acetate 667 milliGRAM(s) Oral four times a day with meals  finasteride 5 milliGRAM(s) Oral daily  heparin  Injectable 5000 Unit(s) SubCutaneous every 12 hours  influenza   Vaccine 0.5 milliLiter(s) IntraMuscular once  meropenem  IVPB 500 milliGRAM(s) IV Intermittent every 24 hours  pantoprazole    Tablet 40 milliGRAM(s) Oral before breakfast    MEDICATIONS  (PRN):  HEALTH ISSUES - PROBLEM Dx:

## 2019-12-23 LAB
-  AMIKACIN: SIGNIFICANT CHANGE UP
-  AMPICILLIN/SULBACTAM: SIGNIFICANT CHANGE UP
-  AMPICILLIN: SIGNIFICANT CHANGE UP
-  AZTREONAM: SIGNIFICANT CHANGE UP
-  CEFAZOLIN: SIGNIFICANT CHANGE UP
-  CEFEPIME: SIGNIFICANT CHANGE UP
-  CEFOXITIN: SIGNIFICANT CHANGE UP
-  CEFTRIAXONE: SIGNIFICANT CHANGE UP
-  CIPROFLOXACIN: SIGNIFICANT CHANGE UP
-  ERTAPENEM: SIGNIFICANT CHANGE UP
-  GENTAMICIN: SIGNIFICANT CHANGE UP
-  IMIPENEM: SIGNIFICANT CHANGE UP
-  LEVOFLOXACIN: SIGNIFICANT CHANGE UP
-  MEROPENEM: SIGNIFICANT CHANGE UP
-  PIPERACILLIN/TAZOBACTAM: SIGNIFICANT CHANGE UP
-  TOBRAMYCIN: SIGNIFICANT CHANGE UP
-  TRIMETHOPRIM/SULFAMETHOXAZOLE: SIGNIFICANT CHANGE UP
A1AT SERPL-MCNC: 209 MG/DL — HIGH (ref 90–200)
AFP-TM SERPL-MCNC: 3.6 NG/ML — SIGNIFICANT CHANGE UP
ALBUMIN SERPL ELPH-MCNC: 3.8 G/DL — SIGNIFICANT CHANGE UP (ref 3.5–5.2)
ALP SERPL-CCNC: 855 U/L — HIGH (ref 30–115)
ALT FLD-CCNC: 60 U/L — HIGH (ref 0–41)
ANION GAP SERPL CALC-SCNC: 21 MMOL/L — HIGH (ref 7–14)
APTT BLD: 28.2 SEC — SIGNIFICANT CHANGE UP (ref 27–39.2)
AST SERPL-CCNC: 29 U/L — SIGNIFICANT CHANGE UP (ref 0–41)
BASOPHILS # BLD AUTO: 0.05 K/UL — SIGNIFICANT CHANGE UP (ref 0–0.2)
BASOPHILS NFR BLD AUTO: 1 % — SIGNIFICANT CHANGE UP (ref 0–1)
BILIRUB DIRECT SERPL-MCNC: 2.2 MG/DL — HIGH (ref 0–0.2)
BILIRUB INDIRECT FLD-MCNC: 0.6 MG/DL — SIGNIFICANT CHANGE UP (ref 0.2–1.2)
BILIRUB SERPL-MCNC: 2.8 MG/DL — HIGH (ref 0.2–1.2)
BUN SERPL-MCNC: 94 MG/DL — CRITICAL HIGH (ref 10–20)
CALCIUM SERPL-MCNC: 8.5 MG/DL — SIGNIFICANT CHANGE UP (ref 8.5–10.1)
CERULOPLASMIN SERPL-MCNC: 43 MG/DL — HIGH (ref 15–30)
CHLORIDE SERPL-SCNC: 88 MMOL/L — LOW (ref 98–110)
CMV IGG FLD QL: >10 U/ML — HIGH
CMV IGG SERPL-IMP: POSITIVE
CMV IGM FLD-ACNC: <8 AU/ML — SIGNIFICANT CHANGE UP
CMV IGM SERPL QL: NEGATIVE — SIGNIFICANT CHANGE UP
CO2 SERPL-SCNC: 24 MMOL/L — SIGNIFICANT CHANGE UP (ref 17–32)
CREAT SERPL-MCNC: 9.9 MG/DL — CRITICAL HIGH (ref 0.7–1.5)
CULTURE RESULTS: SIGNIFICANT CHANGE UP
CULTURE RESULTS: SIGNIFICANT CHANGE UP
EBV EA AB SER IA-ACNC: <5 U/ML — SIGNIFICANT CHANGE UP
EBV EA AB TITR SER IF: POSITIVE
EBV EA IGG SER-ACNC: NEGATIVE — SIGNIFICANT CHANGE UP
EBV NA IGG SER IA-ACNC: >600 U/ML — HIGH
EBV PATRN SPEC IB-IMP: SIGNIFICANT CHANGE UP
EBV VCA IGG AVIDITY SER QL IA: POSITIVE
EBV VCA IGM SER IA-ACNC: 269 U/ML — HIGH
EBV VCA IGM SER IA-ACNC: <10 U/ML — SIGNIFICANT CHANGE UP
EBV VCA IGM TITR FLD: NEGATIVE — SIGNIFICANT CHANGE UP
EOSINOPHIL # BLD AUTO: 0.13 K/UL — SIGNIFICANT CHANGE UP (ref 0–0.7)
EOSINOPHIL NFR BLD AUTO: 2.7 % — SIGNIFICANT CHANGE UP (ref 0–8)
FERRITIN SERPL-MCNC: 1057 NG/ML — HIGH (ref 30–400)
GLUCOSE BLDC GLUCOMTR-MCNC: 104 MG/DL — HIGH (ref 70–99)
GLUCOSE BLDC GLUCOMTR-MCNC: 106 MG/DL — HIGH (ref 70–99)
GLUCOSE BLDC GLUCOMTR-MCNC: 116 MG/DL — HIGH (ref 70–99)
GLUCOSE BLDC GLUCOMTR-MCNC: 118 MG/DL — HIGH (ref 70–99)
GLUCOSE BLDC GLUCOMTR-MCNC: 121 MG/DL — HIGH (ref 70–99)
GLUCOSE SERPL-MCNC: 108 MG/DL — HIGH (ref 70–99)
HAV IGM SER-ACNC: SIGNIFICANT CHANGE UP
HBV CORE IGM SER-ACNC: SIGNIFICANT CHANGE UP
HBV SURFACE AG SER-ACNC: SIGNIFICANT CHANGE UP
HCT VFR BLD CALC: 32.1 % — LOW (ref 42–52)
HCV AB S/CO SERPL IA: 0.24 S/CO — SIGNIFICANT CHANGE UP (ref 0–0.99)
HCV AB SERPL-IMP: SIGNIFICANT CHANGE UP
HGB BLD-MCNC: 10.1 G/DL — LOW (ref 14–18)
IMM GRANULOCYTES NFR BLD AUTO: 0.6 % — HIGH (ref 0.1–0.3)
INR BLD: 1.11 RATIO — SIGNIFICANT CHANGE UP (ref 0.65–1.3)
LYMPHOCYTES # BLD AUTO: 0.73 K/UL — LOW (ref 1.2–3.4)
LYMPHOCYTES # BLD AUTO: 15.2 % — LOW (ref 20.5–51.1)
MAGNESIUM SERPL-MCNC: 2.4 MG/DL — SIGNIFICANT CHANGE UP (ref 1.8–2.4)
MCHC RBC-ENTMCNC: 27.5 PG — SIGNIFICANT CHANGE UP (ref 27–31)
MCHC RBC-ENTMCNC: 31.5 G/DL — LOW (ref 32–37)
MCV RBC AUTO: 87.5 FL — SIGNIFICANT CHANGE UP (ref 80–94)
METHOD TYPE: SIGNIFICANT CHANGE UP
MITOCHONDRIA AB SER-ACNC: SIGNIFICANT CHANGE UP
MONOCYTES # BLD AUTO: 0.78 K/UL — HIGH (ref 0.1–0.6)
MONOCYTES NFR BLD AUTO: 16.3 % — HIGH (ref 1.7–9.3)
NEUTROPHILS # BLD AUTO: 3.07 K/UL — SIGNIFICANT CHANGE UP (ref 1.4–6.5)
NEUTROPHILS NFR BLD AUTO: 64.2 % — SIGNIFICANT CHANGE UP (ref 42.2–75.2)
NRBC # BLD: 0 /100 WBCS — SIGNIFICANT CHANGE UP (ref 0–0)
ORGANISM # SPEC MICROSCOPIC CNT: SIGNIFICANT CHANGE UP
PHOSPHATE SERPL-MCNC: 8.1 MG/DL — HIGH (ref 2.1–4.9)
PLATELET # BLD AUTO: 143 K/UL — SIGNIFICANT CHANGE UP (ref 130–400)
POTASSIUM SERPL-MCNC: 4.5 MMOL/L — SIGNIFICANT CHANGE UP (ref 3.5–5)
POTASSIUM SERPL-SCNC: 4.5 MMOL/L — SIGNIFICANT CHANGE UP (ref 3.5–5)
PROT SERPL-MCNC: 7.1 G/DL — SIGNIFICANT CHANGE UP (ref 6–8)
PROTHROM AB SERPL-ACNC: 12.7 SEC — SIGNIFICANT CHANGE UP (ref 9.95–12.87)
RBC # BLD: 3.67 M/UL — LOW (ref 4.7–6.1)
RBC # FLD: 16.7 % — HIGH (ref 11.5–14.5)
SMOOTH MUSCLE AB SER-ACNC: SIGNIFICANT CHANGE UP
SODIUM SERPL-SCNC: 133 MMOL/L — LOW (ref 135–146)
SPECIMEN SOURCE: SIGNIFICANT CHANGE UP
SPECIMEN SOURCE: SIGNIFICANT CHANGE UP
WBC # BLD: 4.79 K/UL — LOW (ref 4.8–10.8)
WBC # FLD AUTO: 4.79 K/UL — LOW (ref 4.8–10.8)

## 2019-12-23 PROCEDURE — 76705 ECHO EXAM OF ABDOMEN: CPT | Mod: 26

## 2019-12-23 RX ORDER — CEFTRIAXONE 500 MG/1
2000 INJECTION, POWDER, FOR SOLUTION INTRAMUSCULAR; INTRAVENOUS EVERY 24 HOURS
Refills: 0 | Status: DISCONTINUED | OUTPATIENT
Start: 2019-12-23 | End: 2019-12-24

## 2019-12-23 RX ADMIN — PANTOPRAZOLE SODIUM 40 MILLIGRAM(S): 20 TABLET, DELAYED RELEASE ORAL at 06:02

## 2019-12-23 RX ADMIN — Medication 667 MILLIGRAM(S): at 21:48

## 2019-12-23 RX ADMIN — Medication 667 MILLIGRAM(S): at 11:20

## 2019-12-23 RX ADMIN — Medication 667 MILLIGRAM(S): at 17:30

## 2019-12-23 RX ADMIN — Medication 667 MILLIGRAM(S): at 08:30

## 2019-12-23 RX ADMIN — Medication 325 MILLIGRAM(S): at 07:00

## 2019-12-23 RX ADMIN — HEPARIN SODIUM 5000 UNIT(S): 5000 INJECTION INTRAVENOUS; SUBCUTANEOUS at 17:30

## 2019-12-23 RX ADMIN — ATORVASTATIN CALCIUM 20 MILLIGRAM(S): 80 TABLET, FILM COATED ORAL at 21:49

## 2019-12-23 RX ADMIN — CEFTRIAXONE 100 MILLIGRAM(S): 500 INJECTION, POWDER, FOR SOLUTION INTRAMUSCULAR; INTRAVENOUS at 17:26

## 2019-12-23 RX ADMIN — FINASTERIDE 5 MILLIGRAM(S): 5 TABLET, FILM COATED ORAL at 15:00

## 2019-12-23 RX ADMIN — HEPARIN SODIUM 5000 UNIT(S): 5000 INJECTION INTRAVENOUS; SUBCUTANEOUS at 06:02

## 2019-12-23 NOTE — PROGRESS NOTE ADULT - SUBJECTIVE AND OBJECTIVE BOX
[facility] FOLLOW UP NOTE  --------------------------------------------------------------------------------  24 hour events/subjective: Patient examined. Appears comfortable.    PAST HISTORY  --------------------------------------------------------------------------------  No significant changes to PMH, PSH, FHx, SHx, unless otherwise noted    ALLERGIES & MEDICATIONS  --------------------------------------------------------------------------------  Allergies    No Known Allergies    Standing Inpatient Medications  atorvastatin 20 milliGRAM(s) Oral at bedtime  calcium acetate 667 milliGRAM(s) Oral four times a day with meals  finasteride 5 milliGRAM(s) Oral daily  heparin  Injectable 5000 Unit(s) SubCutaneous every 12 hours  influenza   Vaccine 0.5 milliLiter(s) IntraMuscular once  meropenem  IVPB 500 milliGRAM(s) IV Intermittent every 24 hours  pantoprazole    Tablet 40 milliGRAM(s) Oral before breakfast    VITALS/PHYSICAL EXAM  --------------------------------------------------------------------------------  T(C): 36.5 (12-23-19 @ 06:05), Max: 36.5 (12-23-19 @ 06:05)  HR: 89 (12-23-19 @ 06:05) (82 - 89)  BP: 97/55 (12-23-19 @ 06:05) (97/55 - 131/71)  RR: 18 (12-23-19 @ 06:05) (18 - 18)  SpO2: 98% (12-22-19 @ 19:30) (98% - 98%)  Height (cm): 182.9 (12-21-19 @ 18:25)  Weight (kg): 122.8 (12-21-19 @ 18:25)  BMI (kg/m2): 36.7 (12-21-19 @ 18:25)  BSA (m2): 2.42 (12-21-19 @ 18:25)    12-22-19 @ 07:01  -  12-23-19 @ 07:00  --------------------------------------------------------  IN: 520 mL / OUT: 200 mL / NET: 320 mL    Physical Exam:  	Gen: NAD  	Pulm: CTA B/L  	CV: RRR, S1S2  	Abd: +BS, soft, nontender/nondistended  	: No suprapubic tenderness  	LE: Warm,  edema  	Vascular access: AVF with good thrill/bruit    LABS/STUDIES  --------------------------------------------------------------------------------              10.1   4.79  >-----------<  143      [12-23-19 @ 06:22]              32.1     133  |  88  |  94  ----------------------------<  108      [12-23-19 @ 06:22]  4.5   |  24  |  9.9        Ca     8.5     [12-23-19 @ 06:22]      Mg     2.4     [12-23-19 @ 06:22]      Phos  8.1     [12-23-19 @ 06:22]    TPro  7.1  /  Alb  3.8  /  TBili  2.8  /  DBili  2.2  /  AST  29  /  ALT  60  /  AlkPhos  855  [12-23-19 @ 06:22]    PT/INR: PT 12.70, INR 1.11       [12-23-19 @ 06:22]  PTT: 28.2       [12-23-19 @ 06:22]    Troponin 0.13      [12-22-19 @ 07:13]    Creatinine Trend:  SCr 9.9 [12-23 @ 06:22]  SCr 8.7 [12-22 @ 07:13]  SCr 10.1 [12-21 @ 05:48]  SCr 9.5 [12-20 @ 21:00]    Urinalysis - [12-20-19 @ 22:55]      Color Amy / Appearance Clear / SG 1.016 / pH 5.5      Gluc Negative / Ketone Negative  / Bili Small / Urobili 3 mg/dL       Blood Trace / Protein 30 mg/dL / Leuk Est Moderate / Nitrite Negative      RBC 4 / WBC 16 / Hyaline 3 / Gran  / Sq Epi  / Non Sq Epi 2 / Bacteria Negative    HBsAg Nonreact      [12-21-19 @ 05:48]  HCV 0.24, Nonreact      [12-21-19 @ 05:48]

## 2019-12-23 NOTE — PROGRESS NOTE ADULT - ASSESSMENT
ESRD on HD TTS  sepsis / fever due to pan-sensitive Kleb pneummoniae bacteremia   hyponatremia  hyperkalemia  afib not on AC due to GIB  rheumatic heart disease s/p AVR and MVR - bio  DM  MARLI on home O2 and bipap  cholelithiasis  cirrhosis   splenomegaly / ascites  ex-etoh abuse  abnormal LFT's    plan:     next HD today (holiday schedule)  ID f/u  right arm precautions   renal dose meropenem  f/u GI  f/u surgery  low K+ renal diet  phoslo with meals

## 2019-12-23 NOTE — DIETITIAN INITIAL EVALUATION ADULT. - RD TO REMAIN AVAILABLE
yes/Nutrition Intervention: Meals & Snacks, Medical Food Supplements. Monitor: Energy intake, glucose profile, renal profile, nutrition focused physical findings, body composition.

## 2019-12-23 NOTE — PROGRESS NOTE ADULT - SUBJECTIVE AND OBJECTIVE BOX
JOEY, MARILYN  69y, Male    All available historical data reviewed    OVERNIGHT EVENTS:  none    ROS:  General: Denies rigors, nightsweats  HEENT: Denies headache, rhinorrhea, sore throat, eye pain  CV: Denies CP, palpitations  PULM: Denies wheezing, hemoptysis  GI: Denies hematemesis, hematochezia, melena  : Denies discharge, hematuria  MSK: Denies arthralgias, myalgias  SKIN: Denies rash, lesions  NEURO: Denies paresthesias, weakness  PSYCH: Denies depression, anxiety    VITALS:  T(F): 97.8, Max: 97.8 (12-23-19 @ 15:08)  HR: 85  BP: 97/54  RR: 18Vital Signs Last 24 Hrs  T(C): 36.6 (23 Dec 2019 15:08), Max: 36.6 (23 Dec 2019 15:08)  T(F): 97.8 (23 Dec 2019 15:08), Max: 97.8 (23 Dec 2019 15:08)  HR: 85 (23 Dec 2019 15:08) (76 - 89)  BP: 97/54 (23 Dec 2019 15:08) (94/71 - 131/71)  BP(mean): --  RR: 18 (23 Dec 2019 14:30) (18 - 18)  SpO2: 98% (22 Dec 2019 19:30) (98% - 98%)    TESTS & MEASUREMENTS:                        10.1   4.79  )-----------( 143      ( 23 Dec 2019 06:22 )             32.1     12-23    133<L>  |  88<L>  |  94<HH>  ----------------------------<  108<H>  4.5   |  24  |  9.9<HH>    Ca    8.5      23 Dec 2019 06:22  Phos  8.1     12-23  Mg     2.4     12-23    TPro  7.1  /  Alb  3.8  /  TBili  2.8<H>  /  DBili  2.2<H>  /  AST  29  /  ALT  60<H>  /  AlkPhos  855<H>  12-23    LIVER FUNCTIONS - ( 23 Dec 2019 06:22 )  Alb: 3.8 g/dL / Pro: 7.1 g/dL / ALK PHOS: 855 U/L / ALT: 60 U/L / AST: 29 U/L / GGT: x             Culture - Urine (collected 12-20-19 @ 22:55)  Source: .Urine Clean Catch (Midstream)  Final Report (12-22-19 @ 15:31):    >=3 organisms. Probable collection contamination.    Culture - Blood (collected 12-20-19 @ 21:00)  Source: .Blood Blood  Gram Stain (12-21-19 @ 14:18):    Growth in anaerobic bottle: Gram Negative Rods  Final Report (12-23-19 @ 09:41):    Growth in anaerobic bottle: Klebsiella pneumoniae    See previous culture [id number]    Culture - Blood (collected 12-20-19 @ 21:00)  Source: .Blood Blood  Gram Stain (12-21-19 @ 13:33):    Growth in anaerobic bottle: Gram Negative Rods  Final Report (12-23-19 @ 09:33):    Growth in anaerobic bottle: Klebsiella pneumoniae    "Due to technical problems, Proteus sp. will Not be reported as part of    the BCID panel until further notice"    ***Blood Panel PCR results on this specimen are available    approximately 3 hours afterthe Gram stain result.***    Gram stain, PCR, and/or culture results may not always    correspond due to difference in methodologies.    ************************************************************    This PCR assay was performed using [employer].    The following targets are tested for: Enterococcus,    vancomycin resistant enterococci, Listeria monocytogenes,    coagulase negative staphylococci, S. aureus,    methicillin resistant S. aureus, Streptococcus agalactiae    (Group B), S. pneumoniae, S. pyogenes (Group A),    Acinetobacter baumannii, Enterobacter cloacae, E. coli,    Klebsiella oxytoca, K. pneumoniae, Proteus sp.,    Serratia marcescens, Haemophilus influenzae,    Neisseria meningitidis, Pseudomonas aeruginosa, Candida    albicans, C. glabrata, C krusei, C parapsilosis,    C. tropicalis and the KPC resistance gene.  Organism: Blood Culture PCR  Klebsiella pneumoniae (12-23-19 @ 09:33)  Organism: Klebsiella pneumoniae (12-23-19 @ 09:33)      -  Amikacin: S <=16      -  Ampicillin: R 16 These ampicillin results predict results for amoxicillin      -  Ampicillin/Sulbactam: S <=4/2 Enterobacter, Citrobacter, and Serratia may develop resistance during prolonged therapy (3-4 days)      -  Aztreonam: S <=4      -  Cefazolin: S <=2 Enterobacter, Citrobacter, and Serratia may develop resistance during prolonged therapy (3-4 days)      -  Cefepime: S <=2      -  Cefoxitin: S <=8      -  Ceftriaxone: S <=1 Enterobacter, Citrobacter, and Serratia may develop resistance during prolonged therapy      -  Ciprofloxacin: S <=1      -  Ertapenem: S <=0.5      -  Gentamicin: S <=2      -  Imipenem: S <=1      -  Levofloxacin: S <=2      -  Meropenem: S <=1      -  Piperacillin/Tazobactam: S <=8      -  Tobramycin: S <=2      -  Trimethoprim/Sulfamethoxazole: S <=2/38      Method Type: RUBÉN  Organism: Blood Culture PCR (12-23-19 @ 09:33)      -  Klebsiella pneumoniae: Detec      Method Type: PCR            RADIOLOGY & ADDITIONAL TESTS:  Personal review of radiological diagnostics performed  Echo and EKG results noted when applicable.     ANTIBIOTICS:  meropenem  IVPB 500 milliGRAM(s) IV Intermittent every 24 hours

## 2019-12-23 NOTE — PROGRESS NOTE ADULT - ASSESSMENT
ASSESSMENT  68 yo Belarusian speaking male with PMH of Hypertension, ESRD on HD T/Th/S, Atrial fibrillation not on AC due to GI bleed, DM, MARLI on O2 3l and BiPAP at night, rheumatic heart disease s/p Aortic and Mitral valve replacement, likely ETOH cirrhosis with portal HTN presents with chief complaint of fever and generalized weakness for 2 days.    IMPRESSION  #Klebsiella bacteremia suspect GI source, transaminitis and high tbili/Alk ph (resolving)?passed stone, cannot r/o SBP    CTAP cirrhosis    RUQ Cholelithiasis with contracted gallbladder wall thickening. small ascites. neg mason, CBD wnl.     12/20 BCX NG    UA WBC 16   #Transaminitis, cholestatic, resolving   #Atelectasis on CT  #ETOH cirrhosis with portal HTN    Hep B/C neg  #Morbid Obesity BMI (kg/m2): 36.7  #Elev trop/bnp  #ESRD on HD via AVF, clean    RECOMMENDATIONS  -rocephin 2 gm iv q24h  - d/c meropenem   - Check HIV ab/ag  - RUQ US with small ascites, likely no pocket on Bedside US would assess for paracentesis to r/o SBP

## 2019-12-23 NOTE — PROGRESS NOTE ADULT - SUBJECTIVE AND OBJECTIVE BOX
Patient is a 69y old  Male who presents with a chief complaint of Weakness (23 Dec 2019 07:05)      OVERNIGHT EVENTS: remained stable     SUBJECTIVE / INTERVAL HPI: Patient seen and examined at bedside.     VITAL SIGNS:  Vital Signs Last 24 Hrs  T(C): 36.5 (23 Dec 2019 06:05), Max: 36.5 (23 Dec 2019 06:05)  T(F): 97.7 (23 Dec 2019 06:05), Max: 97.7 (23 Dec 2019 06:05)  HR: 89 (23 Dec 2019 06:05) (82 - 89)  BP: 97/55 (23 Dec 2019 06:05) (97/55 - 131/71)  BP(mean): --  RR: 18 (23 Dec 2019 06:05) (18 - 18)  SpO2: 98% (22 Dec 2019 19:30) (98% - 98%)    PHYSICAL EXAM:    General: WDWN  HEENT: NC/AT; PERRL, clear conjunctiva  Neck: supple  Cardiovascular: +S1/S2; RRR  Respiratory: CTA b/l; no W/R/R  Gastrointestinal: soft, NT/ND; +BSx4  Extremities: WWP; 2+ peripheral pulses; no edema   Neurological: AAOx3; no focal deficits    MEDICATIONS:  MEDICATIONS  (STANDING):  atorvastatin 20 milliGRAM(s) Oral at bedtime  calcium acetate 667 milliGRAM(s) Oral four times a day with meals  finasteride 5 milliGRAM(s) Oral daily  heparin  Injectable 5000 Unit(s) SubCutaneous every 12 hours  influenza   Vaccine 0.5 milliLiter(s) IntraMuscular once  meropenem  IVPB 500 milliGRAM(s) IV Intermittent every 24 hours  pantoprazole    Tablet 40 milliGRAM(s) Oral before breakfast    MEDICATIONS  (PRN):      ALLERGIES:  Allergies    No Known Allergies    Intolerances        LABS:                        10.1   4.79  )-----------( 143      ( 23 Dec 2019 06:22 )             32.1     12-23    133<L>  |  88<L>  |  94<HH>  ----------------------------<  108<H>  4.5   |  24  |  9.9<HH>    Ca    8.5      23 Dec 2019 06:22  Phos  8.1     12-23  Mg     2.4     12-23    TPro  7.1  /  Alb  3.8  /  TBili  2.8<H>  /  DBili  2.2<H>  /  AST  29  /  ALT  60<H>  /  AlkPhos  855<H>  12-23    PT/INR - ( 23 Dec 2019 06:22 )   PT: 12.70 sec;   INR: 1.11 ratio         PTT - ( 23 Dec 2019 06:22 )  PTT:28.2 sec    CAPILLARY BLOOD GLUCOSE      POCT Blood Glucose.: 121 mg/dL (23 Dec 2019 11:22)      Culture - Urine (12.20.19 @ 22:55)    Specimen Source: .Urine Clean Catch (Midstream)    Culture Results:   >=3 organisms. Probable collection contamination.    Culture - Blood (12.20.19 @ 21:00)    Gram Stain:   Growth in anaerobic bottle: Gram Negative Rods    Specimen Source: .Blood Blood    Culture Results:   Growth in anaerobic bottle: Klebsiella pneumoniae  See previous culture [id number]    < from: US Abdomen Limited (12.21.19 @ 02:07) >  IMPRESSION:    1.  Hepatomegaly by length with a slightly lobulated contour suggestive of cirrhosis.    2.  Cholelithiasis with contracted gallbladder wall thickening.    3.  Small volume ascites.    < end of copied text >  < from: CT Abdomen and Pelvis w/ IV Cont (12.21.19 @ 01:15) >  Slightly nodular hepatic contour. Given the associated splenomegaly and small volume abdominal pelvic ascites, cirrhosis and portal hypertension is favored.    Cholelithiasis.    Indeterminate 2 cm right upper pole hyperdensity. Given partially imaged pacer wires, recommend nonemergent renal protocol CT for further evaluation.    < end of copied text >

## 2019-12-23 NOTE — PROGRESS NOTE ADULT - ASSESSMENT
1. RUQ abdominal pain (resolved), transaminitis (trending down), liver cirrhosis likely  secondary to H/O ETOH abuse. Gallstone with contracted gallbladder, no cholecystitis. GI/Surgery following. No acute intervention for now  2. Klebsiella bacteremia: GI source? Meropenem IV per ID. Follow up sensitivities. Consider bedside abdominal paracentesis for ascitic fluid analysis  3. Volume overload/ESRD: HD per Renal  4. OOB to chair  5. Fall, aspiration, decubitus precautions, GI/DVT prophylaxis  6. AFIB: Not candidate for anticoagulation due to bleeding risk

## 2019-12-23 NOTE — PROGRESS NOTE ADULT - SUBJECTIVE AND OBJECTIVE BOX
MARILYN COOK  69y  Male      Patient is a 69y old  Male who presents with a chief complaint of Weakness (22 Dec 2019 11:48)      INTERVAL HPI/OVERNIGHT EVENTS: No new issues      REVIEW OF SYSTEMS:  CONSTITUTIONAL: No fever, weight loss, or fatigue  EYES: No eye pain, visual disturbances, or discharge  ENMT:  No difficulty hearing, tinnitus, vertigo; No sinus or throat pain  NECK: No pain or stiffness  BREASTS: No pain, masses, or nipple discharge  RESPIRATORY: No cough, wheezing, chills or hemoptysis; No shortness of breath  CARDIOVASCULAR: No chest pain, palpitations, dizziness, or leg swelling  GASTROINTESTINAL: No abdominal or epigastric pain. No nausea, vomiting, or hematemesis; No diarrhea or constipation. No melena or hematochezia.  GENITOURINARY: No dysuria, frequency, hematuria, or incontinence  NEUROLOGICAL: No headaches, memory loss, loss of strength, numbness, or tremors  SKIN: No itching, burning, rashes, or lesions   LYMPH NODES: No enlarged glands  ENDOCRINE: No heat or cold intolerance; No hair loss  MUSCULOSKELETAL: No joint pain or swelling; No muscle, back, or extremity pain  PSYCHIATRIC: No depression, anxiety, mood swings, or difficulty sleeping  HEME/LYMPH: No easy bruising, or bleeding gums  ALLERY AND IMMUNOLOGIC: No hives or eczema    T(C): 36.5 (12-23-19 @ 06:05), Max: 36.5 (12-23-19 @ 06:05)  HR: 89 (12-23-19 @ 06:05) (70 - 89)  BP: 97/55 (12-23-19 @ 06:05) (97/55 - 131/71)  RR: 18 (12-23-19 @ 06:05) (18 - 18)  SpO2: 98% (12-22-19 @ 19:30) (97% - 98%)  Wt(kg): --Vital Signs Last 24 Hrs  T(C): 36.5 (23 Dec 2019 06:05), Max: 36.5 (23 Dec 2019 06:05)  T(F): 97.7 (23 Dec 2019 06:05), Max: 97.7 (23 Dec 2019 06:05)  HR: 89 (23 Dec 2019 06:05) (70 - 89)  BP: 97/55 (23 Dec 2019 06:05) (97/55 - 131/71)  BP(mean): --  RR: 18 (23 Dec 2019 06:05) (18 - 18)  SpO2: 98% (22 Dec 2019 19:30) (97% - 98%)    PHYSICAL EXAM:  GENERAL: NAD, well-groomed, well-developed  HEAD:  Atraumatic, Normocephalic  EYES: EOMI, PERRLA, conjunctiva and sclera clear  ENMT: No tonsillar erythema, exudates, or enlargement; Moist mucous membranes, Good dentition, No lesions  NECK: Supple, No JVD, Normal thyroid  NERVOUS SYSTEM:  Alert & Oriented X3, Good concentration; Motor Strength 5/5 B/L upper and lower extremities; DTRs 2+ intact and symmetric  CHEST/LUNG: Clear to percussion bilaterally; No rales, rhonchi, wheezing, or rubs  HEART: Regular rate and rhythm; No murmurs, rubs, or gallops  ABDOMEN: Soft, Nontender, Nondistended; Bowel sounds present  EXTREMITIES:  2+ Peripheral Pulses, No clubbing, cyanosis,  edema improved  LYMPH: No lymphadenopathy noted  SKIN: No rashes or lesions    Consultant(s) Notes Reviewed:  [x ] YES  [ ] NO    Discussed with Consultants/Other Providers [ x] YES     LABS                         10.1   5.80  )-----------( 120      ( 22 Dec 2019 07:13 )             32.3   12-22    134<L>  |  89<L>  |  77<HH>  ----------------------------<  123<H>  4.7   |  25  |  8.7<HH>    Ca    8.5      22 Dec 2019 07:13  Phos  6.7     12-22  Mg     2.4     12-22    TPro  7.1  /  Alb  3.7  /  TBili  3.7<H>  /  DBili  3.0<H>  /  AST  35  /  ALT  76<H>  /  AlkPhos  745<H>  12-22        RADIOLOGY & ADDITIONAL TESTS:    Imaging Personally Reviewed:  [ ] YES  [ ] NO    MEDICATIONS  (STANDING):  atorvastatin 20 milliGRAM(s) Oral at bedtime  calcium acetate 667 milliGRAM(s) Oral four times a day with meals  finasteride 5 milliGRAM(s) Oral daily  heparin  Injectable 5000 Unit(s) SubCutaneous every 12 hours  influenza   Vaccine 0.5 milliLiter(s) IntraMuscular once  meropenem  IVPB 500 milliGRAM(s) IV Intermittent every 24 hours  pantoprazole    Tablet 40 milliGRAM(s) Oral before breakfast    MEDICATIONS  (PRN):  HEALTH ISSUES - PROBLEM Dx:

## 2019-12-23 NOTE — DIETITIAN INITIAL EVALUATION ADULT. - DIET TYPE
DASH/TLC + Consistent Carbohydrate diet (evening snack) + Renal Replacement (no protein restriction). Appetite reportedly decreased this admit d/t nausea & abd discomfort this admit however improved at this time. Consuming 50% of meals at this time.

## 2019-12-23 NOTE — CONSULT NOTE ADULT - SUBJECTIVE AND OBJECTIVE BOX
INTERVENTIONAL RADIOLOGY CONSULT:     Procedure Requested: image guided paracentesis     HPI:  Patient is a 68 yo [place]inean speaking male with PMH of Hypertension, ESRD on HD T/Th/S, Atrial fibrillation not on AC due to GI bleed, DM, MARLI on O2 3l and BiPAP at night, rheumatic heart disease s/p Aortic and Mitral valve replacement present with chief complaint of fever and generalized weakness for 2 days. He was having abdominal pain in right upper quadrant region, intermittent, associated with severe nausea yesterday. Also c/o subjective fevers at home. He didn't go to his HD yesterday because of the symptoms. Patient also has shortness of breath on exertion at baseline and worsening lower extremity edema in the last few weeks. No c/o vomiting, diarrhea, constipation, hematemesis, hematochezia. No recent weight loss. No history of gall stones, no family history of malignancy. Patient was a smoker since age of 11, 1.5pc/day and stopped 4yrs ago. He was also a chronic alcoholic, stopped in 2015 after his valve replacement surgery. He had EGD and colonoscopy in 2015 due to GI bleeding which as per the daughter was normal. Patient was never diagnosed with Cirrhosis in the past.  In ED, /68 , HR 86, Platelet 127, bilirubin 7, alkaline phosphatase 714, AST 83, , troponin 0.14, BNP 73803. CT abdomen showed Slightly nodular hepatic contour with associated splenomegaly and small volume abdominal pelvic ascites. US abdomen showed cholelithiasis with contracted gallbladder wall thickening, nodular liver. (21 Dec 2019 02:23)      PAST MEDICAL & SURGICAL HISTORY:  ESRD (end stage renal disease) on dialysis  Diabetes mellitus  HTN (hypertension)  AV fistula: right upper arm  H/O cardiac pacemaker  H/O mitral valve replacement  H/O aortic valve replacement      MEDICATIONS  (STANDING):  atorvastatin 20 milliGRAM(s) Oral at bedtime  calcium acetate 667 milliGRAM(s) Oral four times a day with meals  finasteride 5 milliGRAM(s) Oral daily  heparin  Injectable 5000 Unit(s) SubCutaneous every 12 hours  influenza   Vaccine 0.5 milliLiter(s) IntraMuscular once  meropenem  IVPB 500 milliGRAM(s) IV Intermittent every 24 hours  pantoprazole    Tablet 40 milliGRAM(s) Oral before breakfast    MEDICATIONS  (PRN):      Allergies    No Known Allergies    Intolerances      FAMILY HISTORY:      Physical Exam:   Vital Signs Last 24 Hrs  T(C): 36.6 (23 Dec 2019 15:08), Max: 36.6 (23 Dec 2019 15:08)  T(F): 97.8 (23 Dec 2019 15:08), Max: 97.8 (23 Dec 2019 15:08)  HR: 85 (23 Dec 2019 15:08) (76 - 89)  BP: 97/54 (23 Dec 2019 15:08) (94/71 - 131/71)  BP(mean): --  RR: 18 (23 Dec 2019 14:30) (18 - 18)  SpO2: 98% (22 Dec 2019 19:30) (98% - 98%)      Labs:                         10.1   4.79  )-----------( 143      ( 23 Dec 2019 06:22 )             32.1     12-23    133<L>  |  88<L>  |  94<HH>  ----------------------------<  108<H>  4.5   |  24  |  9.9<HH>    Ca    8.5      23 Dec 2019 06:22  Phos  8.1     12-23  Mg     2.4     12-23    TPro  7.1  /  Alb  3.8  /  TBili  2.8<H>  /  DBili  2.2<H>  /  AST  29  /  ALT  60<H>  /  AlkPhos  855<H>  12-23    PT/INR - ( 23 Dec 2019 06:22 )   PT: 12.70 sec;   INR: 1.11 ratio         PTT - ( 23 Dec 2019 06:22 )  PTT:28.2 sec    Pertinent labs:                      10.1   4.79  )-----------( 143      ( 23 Dec 2019 06:22 )             32.1       12-23    133<L>  |  88<L>  |  94<HH>  ----------------------------<  108<H>  4.5   |  24  |  9.9<HH>    Ca    8.5      23 Dec 2019 06:22  Phos  8.1     12-23  Mg     2.4     12-23    TPro  7.1  /  Alb  3.8  /  TBili  2.8<H>  /  DBili  2.2<H>  /  AST  29  /  ALT  60<H>  /  AlkPhos  855<H>  12-23      PT/INR - ( 23 Dec 2019 06:22 )   PT: 12.70 sec;   INR: 1.11 ratio         PTT - ( 23 Dec 2019 06:22 )  PTT:28.2 sec    Radiology & Additional Studies:     < from: US Abdomen Limited (12.21.19 @ 02:07) >  IMPRESSION:    1.  Hepatomegaly by length with a slightly lobulated contour suggestive of cirrhosis.    2.  Cholelithiasis with contracted gallbladder wall thickening.    3.  Small volume ascites.    < end of copied text >      Radiology imaging reviewed.       ASSESSMENT/ PLAN:   Patient is a 68 yo [place]inean speaking male with PMH of Hypertension, ESRD on HD T/Th/S, Atrial fibrillation not on AC due to GI bleed, DM, MARLI on O2 3l and BiPAP at night, rheumatic heart disease s/p Aortic and Mitral valve replacement present with chief complaint of fever and generalized weakness for 2 days. Patient was a chronic alcoholic, stopped in 2015 after his valve replacement surgery. He had EGD and colonoscopy in 2015 due to GI bleeding which as per the daughter was normal. Patient was never diagnosed with Cirrhosis in the past.  - consulted for image guided paracentesis - r/o SBP  - US abdomen reviewed - small volume ascites - insufficient window for drainage at this time  - no IR intervention warranted at this time  - can re-evaluate fluid accumulation later in the week     Thank you for the courtesy of this consult, please call [phone] with any further questions.

## 2019-12-23 NOTE — PROGRESS NOTE ADULT - ASSESSMENT
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     # RUQ pain (resolved),   -cholelithiasis: no acute cholecystitis no intervention per surgery and GI at this time     #2cm of hyperdense at R upper kidney pole  -needs CT renal protocol as outpt    #afib: rate controlled no AC, risk of bleeding from cirrhosis liver    #ESRD:  -f/u with nephro jorge    #MARLI  -on 3l NC and BiPAP overnight    #Diet: DASH/TLC/Carb consistent/Renal diet  #DVT ppx: hep sq  #GI ppx: protonix  #Dispo: Acute 70 yo [place] speaking male with PMH of Hypertension, ESRD on HD T/Th/S, Atrial fibrillation not on AC due to GI bleed, DM, MARLI on O2 3l and BiPAP at night, rheumatic heart disease s/p Aortic and Mitral valve replacement, 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     # RUQ pain (resolved),   -cholelithiasis: no acute cholecystitis no intervention per surgery and GI at this time     #2cm of hyperdense at R upper kidney pole  -needs CT renal protocol as outpt    #afib: rate controlled no AC, risk of bleeding from cirrhosis liver    #ESRD:  -f/u with nephro jorge    #MARLI  -on 3l NC and BiPAP overnight    #Diet: DASH/TLC/Carb consistent/Renal diet  #DVT ppx: hep sq  #GI ppx: protonix  #Dispo: Acute

## 2019-12-23 NOTE — DIETITIAN INITIAL EVALUATION ADULT. - PERTINENT LABORATORY DATA
12/23: RBC-3.67, H/H-10.1/32.1, Na-133, Cl-88, BUN-94, creat-9.9, gluc-108, PO4-8.1, POCT gluc-118 (16:38) vs. 121 (11:22) vs. 106 (8:12)

## 2019-12-23 NOTE — DIETITIAN INITIAL EVALUATION ADULT. - ENERGY NEEDS
Energy: 9453-8832 kcal/day (30-35 kcal/kg IBW) - aim towards lower range d/t obese BMI; increased needs 2/2 ESRD on HD    Protein:  g/day (1.2-1.3 g/kg IBW) - as above    Fluids: 1 L + urine output or per LIP

## 2019-12-23 NOTE — DIETITIAN INITIAL EVALUATION ADULT. - PHYSICAL APPEARANCE
BMI: 36.7. 2+ edema (B/L ankle, B/L leg, B/L foot). Alert. Last BM 12/22. Abd noted distended. No chewing/swallowing difficulty reported. Skin noted intact.

## 2019-12-23 NOTE — DIETITIAN INITIAL EVALUATION ADULT. - OTHER INFO
Pertinent Medical Information: p/w weakness. Fever and generalized weakness for 2 days noted. Klebsiella bacteremia noted. Sepsis noted. Liver cirrhosis noted. RUQ pain - noted resolved. ESRD - on HD.    Pertinent Subjective Information: Per family: Fair appetite & po intake until 1 week PTP d/t abd discomfort & nausea. Low salt & low sugar diet PTP. Limited knowledge of renal, heart healthy & DM nutrition concepts. Does not demonstrate readiness for nutrition education at this time. No h/o unintentional wt loss reported at this time. NKFA. No supplements. No preferences reported.

## 2019-12-24 ENCOUNTER — TRANSCRIPTION ENCOUNTER (OUTPATIENT)
Age: 69
End: 2019-12-24

## 2019-12-24 VITALS
RESPIRATION RATE: 18 BRPM | DIASTOLIC BLOOD PRESSURE: 56 MMHG | HEART RATE: 80 BPM | TEMPERATURE: 98 F | SYSTOLIC BLOOD PRESSURE: 116 MMHG

## 2019-12-24 LAB
ALBUMIN SERPL ELPH-MCNC: 3.7 G/DL — SIGNIFICANT CHANGE UP (ref 3.5–5.2)
ALP SERPL-CCNC: 900 U/L — HIGH (ref 30–115)
ALT FLD-CCNC: 47 U/L — HIGH (ref 0–41)
ANA TITR SER: NEGATIVE — SIGNIFICANT CHANGE UP
ANION GAP SERPL CALC-SCNC: 18 MMOL/L — HIGH (ref 7–14)
APTT BLD: 29.6 SEC — SIGNIFICANT CHANGE UP (ref 27–39.2)
AST SERPL-CCNC: 28 U/L — SIGNIFICANT CHANGE UP (ref 0–41)
BASOPHILS # BLD AUTO: 0.05 K/UL — SIGNIFICANT CHANGE UP (ref 0–0.2)
BASOPHILS NFR BLD AUTO: 1 % — SIGNIFICANT CHANGE UP (ref 0–1)
BILIRUB DIRECT SERPL-MCNC: 1.7 MG/DL — HIGH (ref 0–0.2)
BILIRUB INDIRECT FLD-MCNC: 0.5 MG/DL — SIGNIFICANT CHANGE UP (ref 0.2–1.2)
BILIRUB SERPL-MCNC: 2.2 MG/DL — HIGH (ref 0.2–1.2)
BUN SERPL-MCNC: 78 MG/DL — CRITICAL HIGH (ref 10–20)
CALCIUM SERPL-MCNC: 8.6 MG/DL — SIGNIFICANT CHANGE UP (ref 8.5–10.1)
CHLORIDE SERPL-SCNC: 90 MMOL/L — LOW (ref 98–110)
CO2 SERPL-SCNC: 26 MMOL/L — SIGNIFICANT CHANGE UP (ref 17–32)
CREAT SERPL-MCNC: 8.6 MG/DL — CRITICAL HIGH (ref 0.7–1.5)
EOSINOPHIL # BLD AUTO: 0.15 K/UL — SIGNIFICANT CHANGE UP (ref 0–0.7)
EOSINOPHIL NFR BLD AUTO: 2.9 % — SIGNIFICANT CHANGE UP (ref 0–8)
GLUCOSE BLDC GLUCOMTR-MCNC: 102 MG/DL — HIGH (ref 70–99)
GLUCOSE BLDC GLUCOMTR-MCNC: 114 MG/DL — HIGH (ref 70–99)
GLUCOSE SERPL-MCNC: 109 MG/DL — HIGH (ref 70–99)
HCT VFR BLD CALC: 30.8 % — LOW (ref 42–52)
HGB BLD-MCNC: 9.7 G/DL — LOW (ref 14–18)
IMM GRANULOCYTES NFR BLD AUTO: 0.6 % — HIGH (ref 0.1–0.3)
INR BLD: 1.13 RATIO — SIGNIFICANT CHANGE UP (ref 0.65–1.3)
LKM AB SER-ACNC: <20.1 UNITS — SIGNIFICANT CHANGE UP (ref 0–20)
LYMPHOCYTES # BLD AUTO: 0.66 K/UL — LOW (ref 1.2–3.4)
LYMPHOCYTES # BLD AUTO: 12.8 % — LOW (ref 20.5–51.1)
MAGNESIUM SERPL-MCNC: 2.3 MG/DL — SIGNIFICANT CHANGE UP (ref 1.8–2.4)
MCHC RBC-ENTMCNC: 27.5 PG — SIGNIFICANT CHANGE UP (ref 27–31)
MCHC RBC-ENTMCNC: 31.5 G/DL — LOW (ref 32–37)
MCV RBC AUTO: 87.3 FL — SIGNIFICANT CHANGE UP (ref 80–94)
MITOCHONDRIA AB SER-ACNC: SIGNIFICANT CHANGE UP
MONOCYTES # BLD AUTO: 0.75 K/UL — HIGH (ref 0.1–0.6)
MONOCYTES NFR BLD AUTO: 14.6 % — HIGH (ref 1.7–9.3)
NEUTROPHILS # BLD AUTO: 3.5 K/UL — SIGNIFICANT CHANGE UP (ref 1.4–6.5)
NEUTROPHILS NFR BLD AUTO: 68.1 % — SIGNIFICANT CHANGE UP (ref 42.2–75.2)
NRBC # BLD: 0 /100 WBCS — SIGNIFICANT CHANGE UP (ref 0–0)
PHOSPHATE SERPL-MCNC: 7.6 MG/DL — HIGH (ref 2.1–4.9)
PLATELET # BLD AUTO: 143 K/UL — SIGNIFICANT CHANGE UP (ref 130–400)
POTASSIUM SERPL-MCNC: 4.6 MMOL/L — SIGNIFICANT CHANGE UP (ref 3.5–5)
POTASSIUM SERPL-SCNC: 4.6 MMOL/L — SIGNIFICANT CHANGE UP (ref 3.5–5)
PROT SERPL-MCNC: 6.8 G/DL — SIGNIFICANT CHANGE UP (ref 6–8)
PROTHROM AB SERPL-ACNC: 13 SEC — HIGH (ref 9.95–12.87)
RBC # BLD: 3.53 M/UL — LOW (ref 4.7–6.1)
RBC # FLD: 16.8 % — HIGH (ref 11.5–14.5)
SMOOTH MUSCLE AB SER-ACNC: SIGNIFICANT CHANGE UP
SODIUM SERPL-SCNC: 134 MMOL/L — LOW (ref 135–146)
WBC # BLD: 5.14 K/UL — SIGNIFICANT CHANGE UP (ref 4.8–10.8)
WBC # FLD AUTO: 5.14 K/UL — SIGNIFICANT CHANGE UP (ref 4.8–10.8)

## 2019-12-24 PROCEDURE — 93010 ELECTROCARDIOGRAM REPORT: CPT

## 2019-12-24 RX ORDER — CEFPODOXIME PROXETIL 100 MG
1 TABLET ORAL
Qty: 4 | Refills: 0
Start: 2019-12-24 | End: 2019-12-27

## 2019-12-24 RX ORDER — ROSUVASTATIN CALCIUM 5 MG/1
1 TABLET ORAL
Qty: 0 | Refills: 0 | DISCHARGE

## 2019-12-24 RX ADMIN — PANTOPRAZOLE SODIUM 40 MILLIGRAM(S): 20 TABLET, DELAYED RELEASE ORAL at 06:21

## 2019-12-24 RX ADMIN — Medication 667 MILLIGRAM(S): at 09:02

## 2019-12-24 RX ADMIN — HEPARIN SODIUM 5000 UNIT(S): 5000 INJECTION INTRAVENOUS; SUBCUTANEOUS at 06:20

## 2019-12-24 RX ADMIN — FINASTERIDE 5 MILLIGRAM(S): 5 TABLET, FILM COATED ORAL at 11:16

## 2019-12-24 RX ADMIN — Medication 667 MILLIGRAM(S): at 12:54

## 2019-12-24 NOTE — DISCHARGE NOTE PROVIDER - NSDCCPCAREPLAN_GEN_ALL_CORE_FT
PRINCIPAL DISCHARGE DIAGNOSIS  Diagnosis: Liver cirrhosis  Assessment and Plan of Treatment: your labs for the liver were abnormal and consistent with liver cirhosis, although the level are relatively stable you need to follow up with the liver clinic as outpatient, and to follow up the peding labs for further evaluation      SECONDARY DISCHARGE DIAGNOSES  Diagnosis: SBP (spontaneous bacterial peritonitis)  Assessment and Plan of Treatment: you was found to have bacteria (Klebsellia in the blood0 and the source is likley from fluid in the abdomen, the amount of the fluid is very small to be drained, you was started on antibiotics and the bactereia is cleared, you should continue the antibiotics as instructed, one tablet after hemodialysis, total of 4 doses

## 2019-12-24 NOTE — PROGRESS NOTE ADULT - SUBJECTIVE AND OBJECTIVE BOX
[place] [facility] FOLLOW UP NOTE  --------------------------------------------------------------------------------  24 hour events/subjective: Patient examined. Appears comfortable.    PAST HISTORY  --------------------------------------------------------------------------------  No significant changes to PMH, PSH, FHx, SHx, unless otherwise noted    ALLERGIES & MEDICATIONS  --------------------------------------------------------------------------------  Allergies    No Known Allergies    Standing Inpatient Medications  atorvastatin 20 milliGRAM(s) Oral at bedtime  calcium acetate 667 milliGRAM(s) Oral four times a day with meals  cefTRIAXone   IVPB 2000 milliGRAM(s) IV Intermittent every 24 hours  finasteride 5 milliGRAM(s) Oral daily  heparin  Injectable 5000 Unit(s) SubCutaneous every 12 hours  influenza   Vaccine 0.5 milliLiter(s) IntraMuscular once  pantoprazole    Tablet 40 milliGRAM(s) Oral before breakfast    VITALS/PHYSICAL EXAM  --------------------------------------------------------------------------------  T(C): 37 (12-24-19 @ 06:21), Max: 37 (12-24-19 @ 06:21)  HR: 75 (12-24-19 @ 06:21) (75 - 85)  BP: 106/68 (12-24-19 @ 06:21) (94/71 - 114/64)  RR: 18 (12-24-19 @ 06:21) (18 - 18)  SpO2: 94% (12-24-19 @ 06:55) (94% - 98%)    12-23-19 @ 07:01  -  12-24-19 @ 07:00  --------------------------------------------------------  IN: 290 mL / OUT: 3075 mL / NET: -2785 mL    Physical Exam:  	Gen: NAD  	Pulm: CTA B/L  	CV: RRR, S1S2  	Abd: +BS, soft, nontender/nondistended  	: No suprapubic tenderness  	LE: Warm, no edema  	Vascular access: AVF    LABS/STUDIES  --------------------------------------------------------------------------------              9.7    5.14  >-----------<  143      [12-24-19 @ 06:10]              30.8     134  |  90  |  78  ----------------------------<  109      [12-24-19 @ 06:10]  4.6   |  26  |  8.6        Ca     8.6     [12-24-19 @ 06:10]      Mg     2.3     [12-24-19 @ 06:10]      Phos  7.6     [12-24-19 @ 06:10]    TPro  6.8  /  Alb  3.7  /  TBili  2.2  /  DBili  1.7  /  AST  28  /  ALT  47  /  AlkPhos  900  [12-24-19 @ 06:10]    PT/INR: PT 13.00, INR 1.13       [12-24-19 @ 06:10]  PTT: 29.6       [12-24-19 @ 06:10]    Creatinine Trend:  SCr 8.6 [12-24 @ 06:10]  SCr 9.9 [12-23 @ 06:22]  SCr 8.7 [12-22 @ 07:13]  SCr 10.1 [12-21 @ 05:48]  SCr 9.5 [12-20 @ 21:00]    Urinalysis - [12-20-19 @ 22:55]      Color Amy / Appearance Clear / SG 1.016 / pH 5.5      Gluc Negative / Ketone Negative  / Bili Small / Urobili 3 mg/dL       Blood Trace / Protein 30 mg/dL / Leuk Est Moderate / Nitrite Negative      RBC 4 / WBC 16 / Hyaline 3 / Gran  / Sq Epi  / Non Sq Epi 2 / Bacteria Negative      Ferritin 1057      [12-22-19 @ 07:13]    HBsAg Nonreact      [12-22-19 @ 07:13]  HCV 0.24, Nonreact      [12-22-19 @ 07:13]

## 2019-12-24 NOTE — DISCHARGE NOTE PROVIDER - HOSPITAL COURSE
Patient is a 68 yo Thai speaking male with PMH of Hypertension, ESRD on HD T/Th/S, Atrial fibrillation not on AC due to GI bleed, DM, MARLI on O2 3l and BiPAP at night, rheumatic heart disease s/p Aortic and Mitral valve replacement present with chief complaint of fever and generalized weakness for 2 days. He was having abdominal pain in right upper quadrant region, intermittent, associated with severe nausea yesterday. Also c/o subjective fevers at home. pt was found to have new onset cirhosis with klebselia bacteremia, has small ascites so was not able to be drained, diagnosis of SBP was made, pt blood culture cleared and is medically stable for discharge.

## 2019-12-24 NOTE — PROGRESS NOTE ADULT - ASSESSMENT
1. RUQ abdominal pain (resolved). Transaminitis (improving). Gallstone (stable, no intervention)  2. Klebsiella bacteremia: Ceftriaxone per ID. Repeat blood cultures  3. ESRD: HD per Renal  4. OOB to chair. PT for ambulation  5. Rheumatic heart disease: Updated 2DECHO to assess valvular function in view of bacteremia  6. Aspiration, decubitus precautions. GI/DVT prophylaxis  7. PAF: Not candidate for anticoagulation, bleeding risk

## 2019-12-24 NOTE — PROGRESS NOTE ADULT - ASSESSMENT
ESRD on HD TTS  sepsis / fever due to pan-sensitive Kleb pneummoniae bacteremia   hyponatremia  hyperkalemia  afib not on AC due to GIB  rheumatic heart disease s/p AVR and MVR - bio  DM  MARLI on home O2 and bipap  cholelithiasis  cirrhosis   splenomegaly / ascites  ex-etoh abuse  abnormal LFT's    plan:     next HD thursday (holiday schedule)  ID f/u  right arm precautions   on ceftriaxone  f/u GI  f/u surgery  low K+ renal diet  phoslo with meals

## 2019-12-24 NOTE — PROGRESS NOTE ADULT - ASSESSMENT
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     # RUQ pain (resolved),   -cholelithiasis: no acute cholecystitis no intervention per surgery and GI at this time     #2cm of hyperdense at R upper kidney pole  -needs CT renal protocol as outpt    #afib: rate controlled no AC, risk of bleeding from cirrhosis liver    #ESRD:  -f/u with nephro jorge    #MARLI  -on 3l NC and BiPAP overnight    #Diet: DASH/TLC/Carb consistent/Renal diet  #DVT ppx: hep sq  #GI ppx: protonix  #Dispo: discharge today or tomorrow, pending PT 68 yo Dutch speaking male with PMH of Hypertension, ESRD on HD T/Th/S, Atrial fibrillation not on AC due to GI bleed, DM, MARLI on O2 3l and BiPAP at night, rheumatic heart disease s/p Aortic and Mitral valve replacement, 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     # RUQ pain (resolved),   -cholelithiasis: no acute cholecystitis no intervention per surgery and GI at this time     #2cm of hyperdense at R upper kidney pole  -needs CT renal protocol as outpt    #afib: rate controlled no AC, risk of bleeding from cirrhosis liver    #ESRD:  -f/u with nephro jorge    #MARLI  -on 3l NC and BiPAP overnight    #Diet: DASH/TLC/Carb consistent/Renal diet  #DVT ppx: hep sq  #GI ppx: protonix  #Dispo: discharge today or tomorrow, pending PT

## 2019-12-24 NOTE — PROGRESS NOTE ADULT - SUBJECTIVE AND OBJECTIVE BOX
Patient is a 69y old  Male who presents with a chief complaint of Weakness (24 Dec 2019 10:45)      OVERNIGHT EVENTS: remained stable    SUBJECTIVE / INTERVAL HPI: Patient seen and examined at bedside.     VITAL SIGNS:  Vital Signs Last 24 Hrs  T(C): 37 (24 Dec 2019 06:21), Max: 37 (24 Dec 2019 06:21)  T(F): 98.6 (24 Dec 2019 06:21), Max: 98.6 (24 Dec 2019 06:21)  HR: 75 (24 Dec 2019 06:21) (75 - 85)  BP: 106/68 (24 Dec 2019 06:21) (94/71 - 114/64)  BP(mean): --  RR: 18 (24 Dec 2019 06:21) (18 - 18)  SpO2: 94% (24 Dec 2019 06:55) (94% - 98%)    PHYSICAL EXAM:    General: WDWN  HEENT: NC/AT; PERRL, clear conjunctiva  Neck: supple  Cardiovascular: +S1/S2; RRR  Respiratory: CTA b/l; no W/R/R  Gastrointestinal: soft, NT/ND; +BSx4  Extremities: WWP; 2+ peripheral pulses; no edema   Neurological: AAOx3; no focal deficits    MEDICATIONS:  MEDICATIONS  (STANDING):  atorvastatin 20 milliGRAM(s) Oral at bedtime  calcium acetate 667 milliGRAM(s) Oral four times a day with meals  cefTRIAXone   IVPB 2000 milliGRAM(s) IV Intermittent every 24 hours  finasteride 5 milliGRAM(s) Oral daily  heparin  Injectable 5000 Unit(s) SubCutaneous every 12 hours  influenza   Vaccine 0.5 milliLiter(s) IntraMuscular once  pantoprazole    Tablet 40 milliGRAM(s) Oral before breakfast    MEDICATIONS  (PRN):      ALLERGIES:  Allergies    No Known Allergies    Intolerances        LABS:                        9.7    5.14  )-----------( 143      ( 24 Dec 2019 06:10 )             30.8     12-24    134<L>  |  90<L>  |  78<HH>  ----------------------------<  109<H>  4.6   |  26  |  8.6<HH>    Ca    8.6      24 Dec 2019 06:10  Phos  7.6     12-24  Mg     2.3     12-24    TPro  6.8  /  Alb  3.7  /  TBili  2.2<H>  /  DBili  1.7<H>  /  AST  28  /  ALT  47<H>  /  AlkPhos  900<H>  12-24    PT/INR - ( 24 Dec 2019 06:10 )   PT: 13.00 sec;   INR: 1.13 ratio         PTT - ( 24 Dec 2019 06:10 )  PTT:29.6 sec    CAPILLARY BLOOD GLUCOSE      POCT Blood Glucose.: 102 mg/dL (24 Dec 2019 08:14)    < from: CT Abdomen and Pelvis w/ IV Cont (12.21.19 @ 01:15) >  IMPRESSION:    Slightly nodular hepatic contour. Given the associated splenomegaly and small volume abdominal pelvic ascites, cirrhosis and portal hypertension is favored.    Cholelithiasis.    Indeterminate 2 cm right upper pole hyperdensity. Given partially imaged pacer wires, recommend nonemergent renal protocol CT for further evaluation.    < end of copied text >  Culture - Blood in AM (12.22.19 @ 07:13)    Specimen Source: .Blood None    Culture Results:   No growth to date.    Culture - Blood in AM (12.22.19 @ 07:13)    Specimen Source: .Blood None    Culture Results:   No growth to date.    Culture - Blood (12.20.19 @ 21:00)    -  Klebsiella pneumoniae: Detec    -  Amikacin: S <=16    -  Ampicillin: R 16 These ampicillin results predict results for amoxicillin    -  Ampicillin/Sulbactam: S <=4/2 Enterobacter, Citrobacter, and Serratia may develop resistance during prolonged therapy (3-4 days)    -  Aztreonam: S <=4    -  Cefazolin: S <=2 Enterobacter, Citrobacter, and Serratia may develop resistance during prolonged therapy (3-4 days)    -  Cefepime: S <=2    -  Cefoxitin: S <=8    -  Ceftriaxone: S <=1 Enterobacter, Citrobacter, and Serratia may develop resistance during prolonged therapy    -  Ciprofloxacin: S <=1    -  Ertapenem: S <=0.5    -  Gentamicin: S <=2    -  Imipenem: S <=1    -  Levofloxacin: S <=2    -  Meropenem: S <=1    -  Piperacillin/Tazobactam: S <=8    -  Tobramycin: S <=2    -  Trimethoprim/Sulfamethoxazole: S <=2/38    Gram Stain:   Growth in anaerobic bottle: Gram Negative Rods    Specimen Source: .Blood Blood    Organism: Blood Culture PCR    Organism: Klebsiella pneumoniae    Culture Results:   Growth in anaerobic bottle: Klebsiella pneumoniae  "Due to technical problems, Proteus sp. will Not be reported as part of  the BCID panel until further notice"  ***Blood Panel PCR results on this specimen are available  approximately 3 hours afterthe Gram stain result.***  Gram stain, PCR, and/or culture results may not always  correspond due to difference in methodologies.  ************************************************************  This PCR assay was performed using [employer].  The following targets are tested for: Enterococcus,  vancomycin resistant enterococci, Listeria monocytogenes,  coagulase negative staphylococci, S. aureus,  methicillin resistant S. aureus, Streptococcus agalactiae  (Group B), S. pneumoniae, S. pyogenes (Group A),  Acinetobacter baumannii, Enterobacter cloacae, E. coli,  Klebsiella oxytoca, K. pneumoniae, Proteus sp.,  Serratia marcescens, Haemophilus influenzae,  Neisseria meningitidis, Pseudomonas aeruginosa, Candida  albicans, C. glabrata, C krusei, C parapsilosis,  C. tropicalis and the KPC resistance gene.    Organism Identification: Blood Culture PCR  Klebsiella pneumoniae    Method Type: PCR    Method Type: RUBÉN    Culture - Urine (12.20.19 @ 22:55)    Specimen Source: .Urine Clean Catch (Midstream)    Culture Results:   >=3 organisms. Probable collection contamination.

## 2019-12-24 NOTE — DISCHARGE NOTE NURSING/CASE MANAGEMENT/SOCIAL WORK - REASON FOR REFUSAL (REFER PATIENT TO HEALTHCARE PROVIDER FOR FOLLOW-UP):
Patient states he gets the flu shot annually at his dialysisi center. He will ask them if he got it at his next appointment and get one if he hasn't been given it yet

## 2019-12-24 NOTE — DISCHARGE NOTE PROVIDER - CARE PROVIDER_API CALL
Pb Calabrese (MD)  Gastroenterology  [address] [place] [address]  Phone: [phone]  Fax: [phone]  Follow Up Time: 2 weeks

## 2019-12-24 NOTE — DISCHARGE NOTE NURSING/CASE MANAGEMENT/SOCIAL WORK - PATIENT PORTAL LINK FT
You can access the FollowMyHealth Patient Portal offered by [facility] by registering at the following website: [email or web address]. By joining [employer]’s FollowMyHealth portal, you will also be able to view your health information using other applications (apps) compatible with our system.

## 2019-12-24 NOTE — PROGRESS NOTE ADULT - ATTENDING COMMENTS
Prognosis guarded
Patient seen and examined with surgery team on rounds and discussed management plans. Clinically improved no acute damion no furthe rsurgical followup recall if needed

## 2019-12-24 NOTE — PROGRESS NOTE ADULT - SUBJECTIVE AND OBJECTIVE BOX
MARILYN COOK  69y  Male      Patient is a 69y old  Male who presents with a chief complaint of Weakness (23 Dec 2019 16:48)      INTERVAL HPI/OVERNIGHT EVENTS: No new issues      REVIEW OF SYSTEMS:  CONSTITUTIONAL: No fever, weight loss, or fatigue  EYES: No eye pain, visual disturbances, or discharge  ENMT:  No difficulty hearing, tinnitus, vertigo; No sinus or throat pain  NECK: No pain or stiffness  BREASTS: No pain, masses, or nipple discharge  RESPIRATORY: No cough, wheezing, chills or hemoptysis; No shortness of breath  CARDIOVASCULAR: No chest pain, palpitations, dizziness, or leg swelling  GASTROINTESTINAL: No abdominal or epigastric pain. No nausea, vomiting, or hematemesis; No diarrhea or constipation. No melena or hematochezia.  GENITOURINARY: No dysuria, frequency, hematuria, or incontinence  NEUROLOGICAL: No headaches, memory loss, loss of strength, numbness, or tremors  SKIN: No itching, burning, rashes, or lesions   LYMPH NODES: No enlarged glands  ENDOCRINE: No heat or cold intolerance; No hair loss  MUSCULOSKELETAL: No joint pain or swelling; No muscle, back, or extremity pain  PSYCHIATRIC: No depression, anxiety, mood swings, or difficulty sleeping  HEME/LYMPH: No easy bruising, or bleeding gums  ALLERY AND IMMUNOLOGIC: No hives or eczema    T(C): 37 (12-24-19 @ 06:21), Max: 37 (12-24-19 @ 06:21)  HR: 75 (12-24-19 @ 06:21) (75 - 85)  BP: 106/68 (12-24-19 @ 06:21) (94/71 - 114/64)  RR: 18 (12-24-19 @ 06:21) (18 - 18)  SpO2: 94% (12-24-19 @ 06:55) (94% - 98%)  Wt(kg): --Vital Signs Last 24 Hrs  T(C): 37 (24 Dec 2019 06:21), Max: 37 (24 Dec 2019 06:21)  T(F): 98.6 (24 Dec 2019 06:21), Max: 98.6 (24 Dec 2019 06:21)  HR: 75 (24 Dec 2019 06:21) (75 - 85)  BP: 106/68 (24 Dec 2019 06:21) (94/71 - 114/64)  BP(mean): --  RR: 18 (24 Dec 2019 06:21) (18 - 18)  SpO2: 94% (24 Dec 2019 06:55) (94% - 98%)    PHYSICAL EXAM:  GENERAL: NAD, well-groomed, well-developed  HEAD:  Atraumatic, Normocephalic  EYES: EOMI, PERRLA, conjunctiva and sclera clear  ENMT: No tonsillar erythema, exudates, or enlargement; Moist mucous membranes, Good dentition, No lesions  NECK: Supple, No JVD, Normal thyroid  NERVOUS SYSTEM:  Alert & Oriented X3, Good concentration; Motor Strength 5/5 B/L upper and lower extremities; DTRs 2+ intact and symmetric  CHEST/LUNG: Clear to percussion bilaterally; No rales, rhonchi, wheezing, or rubs  HEART: Regular rate and rhythm; No murmurs, rubs, or gallops  ABDOMEN: Soft, Nontender, Nondistended; Bowel sounds present  EXTREMITIES:  2+ Peripheral Pulses, No clubbing, cyanosis, or edema  LYMPH: No lymphadenopathy noted  SKIN: No rashes or lesions    Consultant(s) Notes Reviewed:  [x ] YES  [ ] NO    Discussed with Consultants/Other Providers [ x] YES     LABS                         9.7    5.14  )-----------( 143      ( 24 Dec 2019 06:10 )             30.8   12-24    134<L>  |  90<L>  |  78<HH>  ----------------------------<  109<H>  4.6   |  26  |  8.6<HH>    Ca    8.6      24 Dec 2019 06:10  Phos  7.6     12-24  Mg     2.3     12-24    TPro  6.8  /  Alb  3.7  /  TBili  2.2<H>  /  DBili  1.7<H>  /  AST  28  /  ALT  47<H>  /  AlkPhos  900<H>  12-24        RADIOLOGY & ADDITIONAL TESTS:    Imaging Personally Reviewed:  [ ] YES  [ ] NO  MEDICATIONS  (STANDING):  atorvastatin 20 milliGRAM(s) Oral at bedtime  calcium acetate 667 milliGRAM(s) Oral four times a day with meals  cefTRIAXone   IVPB 2000 milliGRAM(s) IV Intermittent every 24 hours  finasteride 5 milliGRAM(s) Oral daily  heparin  Injectable 5000 Unit(s) SubCutaneous every 12 hours  influenza   Vaccine 0.5 milliLiter(s) IntraMuscular once  pantoprazole    Tablet 40 milliGRAM(s) Oral before breakfast    MEDICATIONS  (PRN):    HEALTH ISSUES - PROBLEM Dx:

## 2019-12-27 LAB
CULTURE RESULTS: SIGNIFICANT CHANGE UP
CULTURE RESULTS: SIGNIFICANT CHANGE UP
SPECIMEN SOURCE: SIGNIFICANT CHANGE UP
SPECIMEN SOURCE: SIGNIFICANT CHANGE UP

## 2019-12-30 LAB
ANA PAT FLD IF-IMP: ABNORMAL
ANA PATTERN 2: ABNORMAL
ANA TITR SER: ABNORMAL
ANTI NUCLEAR FACTOR TITER 2: ABNORMAL

## 2019-12-31 DIAGNOSIS — R06.02 SHORTNESS OF BREATH: ICD-10-CM

## 2019-12-31 DIAGNOSIS — Z99.2 DEPENDENCE ON RENAL DIALYSIS: ICD-10-CM

## 2019-12-31 DIAGNOSIS — I12.0 HYPERTENSIVE CHRONIC KIDNEY DISEASE WITH STAGE 5 CHRONIC KIDNEY DISEASE OR END STAGE RENAL DISEASE: ICD-10-CM

## 2019-12-31 DIAGNOSIS — G47.33 OBSTRUCTIVE SLEEP APNEA (ADULT) (PEDIATRIC): ICD-10-CM

## 2019-12-31 DIAGNOSIS — E11.22 TYPE 2 DIABETES MELLITUS WITH DIABETIC CHRONIC KIDNEY DISEASE: ICD-10-CM

## 2019-12-31 DIAGNOSIS — I10 ESSENTIAL (PRIMARY) HYPERTENSION: ICD-10-CM

## 2019-12-31 DIAGNOSIS — N18.6 END STAGE RENAL DISEASE: ICD-10-CM

## 2019-12-31 DIAGNOSIS — K74.60 UNSPECIFIED CIRRHOSIS OF LIVER: ICD-10-CM

## 2019-12-31 DIAGNOSIS — K65.2 SPONTANEOUS BACTERIAL PERITONITIS: ICD-10-CM

## 2019-12-31 DIAGNOSIS — K80.20 CALCULUS OF GALLBLADDER WITHOUT CHOLECYSTITIS WITHOUT OBSTRUCTION: ICD-10-CM

## 2019-12-31 DIAGNOSIS — E66.9 OBESITY, UNSPECIFIED: ICD-10-CM

## 2019-12-31 DIAGNOSIS — E87.5 HYPERKALEMIA: ICD-10-CM

## 2019-12-31 DIAGNOSIS — I48.91 UNSPECIFIED ATRIAL FIBRILLATION: ICD-10-CM

## 2019-12-31 DIAGNOSIS — Z99.89 DEPENDENCE ON OTHER ENABLING MACHINES AND DEVICES: ICD-10-CM

## 2019-12-31 DIAGNOSIS — E87.1 HYPO-OSMOLALITY AND HYPONATREMIA: ICD-10-CM

## 2020-01-03 PROBLEM — Z00.00 ENCOUNTER FOR PREVENTIVE HEALTH EXAMINATION: Status: ACTIVE | Noted: 2020-01-03

## 2020-01-07 PROBLEM — N18.6 END STAGE RENAL DISEASE: Chronic | Status: ACTIVE | Noted: 2019-12-21

## 2020-01-07 PROBLEM — I10 ESSENTIAL (PRIMARY) HYPERTENSION: Chronic | Status: ACTIVE | Noted: 2019-12-21

## 2020-01-07 PROBLEM — E11.9 TYPE 2 DIABETES MELLITUS WITHOUT COMPLICATIONS: Chronic | Status: ACTIVE | Noted: 2019-12-21

## 2020-01-14 ENCOUNTER — APPOINTMENT (OUTPATIENT)
Dept: GASTROENTEROLOGY | Facility: CLINIC | Age: 70
End: 2020-01-14
Payer: MEDICARE

## 2020-01-14 VITALS
HEART RATE: 78 BPM | HEIGHT: 72 IN | SYSTOLIC BLOOD PRESSURE: 134 MMHG | DIASTOLIC BLOOD PRESSURE: 88 MMHG | BODY MASS INDEX: 37.79 KG/M2 | WEIGHT: 279 LBS

## 2020-01-14 DIAGNOSIS — L80 VITILIGO: ICD-10-CM

## 2020-01-14 DIAGNOSIS — Z86.39 PERSONAL HISTORY OF OTHER ENDOCRINE, NUTRITIONAL AND METABOLIC DISEASE: ICD-10-CM

## 2020-01-14 DIAGNOSIS — Z78.9 OTHER SPECIFIED HEALTH STATUS: ICD-10-CM

## 2020-01-14 DIAGNOSIS — N28.9 DISORDER OF KIDNEY AND URETER, UNSPECIFIED: ICD-10-CM

## 2020-01-14 DIAGNOSIS — Z87.448 PERSONAL HISTORY OF OTHER DISEASES OF URINARY SYSTEM: ICD-10-CM

## 2020-01-14 DIAGNOSIS — Z86.69 PERSONAL HISTORY OF OTHER DISEASES OF THE NERVOUS SYSTEM AND SENSE ORGANS: ICD-10-CM

## 2020-01-14 DIAGNOSIS — Z86.79 PERSONAL HISTORY OF OTHER DISEASES OF THE CIRCULATORY SYSTEM: ICD-10-CM

## 2020-01-14 DIAGNOSIS — K70.31 ALCOHOLIC CIRRHOSIS OF LIVER WITH ASCITES: ICD-10-CM

## 2020-01-14 DIAGNOSIS — Z87.891 PERSONAL HISTORY OF NICOTINE DEPENDENCE: ICD-10-CM

## 2020-01-14 PROCEDURE — 99214 OFFICE O/P EST MOD 30 MIN: CPT

## 2020-01-14 RX ORDER — CALCIUM ACETATE 667 MG/1
667 TABLET ORAL
Refills: 0 | Status: ACTIVE | COMMUNITY

## 2020-01-14 RX ORDER — FINASTERIDE 5 MG/1
5 TABLET, FILM COATED ORAL
Refills: 0 | Status: ACTIVE | COMMUNITY

## 2020-01-14 RX ORDER — PANTOPRAZOLE SODIUM 40 MG/10ML
40 INJECTION, POWDER, FOR SOLUTION INTRAVENOUS
Refills: 0 | Status: ACTIVE | COMMUNITY

## 2020-01-14 NOTE — PHYSICAL EXAM
[General Appearance - Alert] : alert [General Appearance - In No Acute Distress] : in no acute distress [General Appearance - Well Nourished] : well nourished [General Appearance - Well Developed] : well developed [Neck Appearance] : the appearance of the neck was normal [Sclera] : the sclera and conjunctiva were normal [Neck Cervical Mass (___cm)] : no neck mass was observed [Jugular Venous Distention Increased] : there was no jugular-venous distention [Respiration, Rhythm And Depth] : normal respiratory rhythm and effort [] : no respiratory distress [Exaggerated Use Of Accessory Muscles For Inspiration] : no accessory muscle use [Heart Rate And Rhythm] : heart rate was normal and rhythm regular [Apical Impulse] : the apical impulse was normal [Heart Sounds] : normal S1 and S2 [Bowel Sounds] : normal bowel sounds [Abdomen Soft] : soft [Abdomen Tenderness] : non-tender [FreeTextEntry1] : 3+ edema [Abnormal Walk] : normal gait [Impaired Insight] : insight and judgment were intact [Oriented To Time, Place, And Person] : oriented to person, place, and time [Affect] : the affect was normal

## 2020-01-25 ENCOUNTER — FORM ENCOUNTER (OUTPATIENT)
Age: 70
End: 2020-01-25

## 2020-01-26 ENCOUNTER — OUTPATIENT (OUTPATIENT)
Dept: OUTPATIENT SERVICES | Facility: HOSPITAL | Age: 70
LOS: 1 days | Discharge: HOME | End: 2020-01-26
Payer: MEDICARE

## 2020-01-26 DIAGNOSIS — K70.31 ALCOHOLIC CIRRHOSIS OF LIVER WITH ASCITES: ICD-10-CM

## 2020-01-26 DIAGNOSIS — I77.0 ARTERIOVENOUS FISTULA, ACQUIRED: Chronic | ICD-10-CM

## 2020-01-26 DIAGNOSIS — Z95.2 PRESENCE OF PROSTHETIC HEART VALVE: Chronic | ICD-10-CM

## 2020-01-26 DIAGNOSIS — Z95.0 PRESENCE OF CARDIAC PACEMAKER: Chronic | ICD-10-CM

## 2020-01-26 PROCEDURE — 76700 US EXAM ABDOM COMPLETE: CPT | Mod: 26

## 2020-01-27 LAB
BASOPHILS # BLD AUTO: 0.06 K/UL
BASOPHILS NFR BLD AUTO: 0.9 %
EOSINOPHIL # BLD AUTO: 0.07 K/UL
EOSINOPHIL NFR BLD AUTO: 1 %
HCT VFR BLD CALC: 36.2 %
HGB BLD-MCNC: 11.3 G/DL
IMM GRANULOCYTES NFR BLD AUTO: 0.3 %
INR PPP: 1.17 RATIO
LYMPHOCYTES # BLD AUTO: 0.91 K/UL
LYMPHOCYTES NFR BLD AUTO: 13 %
MAN DIFF?: NORMAL
MCHC RBC-ENTMCNC: 27.8 PG
MCHC RBC-ENTMCNC: 31.2 G/DL
MCV RBC AUTO: 89.2 FL
MONOCYTES # BLD AUTO: 0.74 K/UL
MONOCYTES NFR BLD AUTO: 10.6 %
NEUTROPHILS # BLD AUTO: 5.19 K/UL
NEUTROPHILS NFR BLD AUTO: 74.2 %
PLATELET # BLD AUTO: 186 K/UL
PT BLD: 13.4 SEC
RBC # BLD: 4.06 M/UL
RBC # FLD: 16.5 %
WBC # FLD AUTO: 6.99 K/UL

## 2020-01-28 LAB
AFP-TM SERPL-MCNC: 4.5 NG/ML
ALBUMIN SERPL ELPH-MCNC: 4.2 G/DL
ALP BLD-CCNC: 575 U/L
ALT SERPL-CCNC: 20 U/L
ANION GAP SERPL CALC-SCNC: 17 MMOL/L
AST SERPL-CCNC: 30 U/L
BILIRUB SERPL-MCNC: 1.3 MG/DL
BUN SERPL-MCNC: 46 MG/DL
CALCIUM SERPL-MCNC: 9.6 MG/DL
CHLORIDE SERPL-SCNC: 88 MMOL/L
CO2 SERPL-SCNC: 29 MMOL/L
CREAT SERPL-MCNC: 6.6 MG/DL
GLUCOSE SERPL-MCNC: 117 MG/DL
POTASSIUM SERPL-SCNC: 5.1 MMOL/L
PROT SERPL-MCNC: 7.6 G/DL
SODIUM SERPL-SCNC: 134 MMOL/L

## 2020-02-18 ENCOUNTER — APPOINTMENT (OUTPATIENT)
Dept: GASTROENTEROLOGY | Facility: CLINIC | Age: 70
End: 2020-02-18
Payer: MEDICARE

## 2020-02-18 VITALS
BODY MASS INDEX: 37.79 KG/M2 | WEIGHT: 279 LBS | HEART RATE: 80 BPM | SYSTOLIC BLOOD PRESSURE: 119 MMHG | DIASTOLIC BLOOD PRESSURE: 65 MMHG | HEIGHT: 72 IN

## 2020-02-18 DIAGNOSIS — K74.60 UNSPECIFIED CIRRHOSIS OF LIVER: ICD-10-CM

## 2020-02-18 DIAGNOSIS — R60.0 LOCALIZED EDEMA: ICD-10-CM

## 2020-02-18 PROCEDURE — 99214 OFFICE O/P EST MOD 30 MIN: CPT

## 2020-02-28 ENCOUNTER — APPOINTMENT (OUTPATIENT)
Dept: GASTROENTEROLOGY | Facility: CLINIC | Age: 70
End: 2020-02-28

## 2020-03-18 ENCOUNTER — LABORATORY RESULT (OUTPATIENT)
Age: 70
End: 2020-03-18

## 2020-03-24 ENCOUNTER — APPOINTMENT (OUTPATIENT)
Dept: GASTROENTEROLOGY | Facility: CLINIC | Age: 70
End: 2020-03-24

## 2021-01-01 ENCOUNTER — EMERGENCY (EMERGENCY)
Facility: HOSPITAL | Age: 71
LOS: 0 days | Discharge: HOME | End: 2021-10-03
Attending: EMERGENCY MEDICINE | Admitting: EMERGENCY MEDICINE
Payer: MEDICARE

## 2021-01-01 ENCOUNTER — EMERGENCY (EMERGENCY)
Facility: HOSPITAL | Age: 71
LOS: 0 days | Discharge: HOME | End: 2021-04-30
Attending: EMERGENCY MEDICINE | Admitting: EMERGENCY MEDICINE
Payer: MEDICARE

## 2021-01-01 ENCOUNTER — OUTPATIENT (OUTPATIENT)
Dept: OUTPATIENT SERVICES | Facility: HOSPITAL | Age: 71
LOS: 1 days | Discharge: HOME | End: 2021-01-01
Payer: MEDICARE

## 2021-01-01 ENCOUNTER — OUTPATIENT (OUTPATIENT)
Dept: OUTPATIENT SERVICES | Facility: HOSPITAL | Age: 71
LOS: 1 days | Discharge: HOME | End: 2021-01-01

## 2021-01-01 ENCOUNTER — EMERGENCY (EMERGENCY)
Facility: HOSPITAL | Age: 71
LOS: 0 days | Discharge: HOME | End: 2021-08-16
Attending: STUDENT IN AN ORGANIZED HEALTH CARE EDUCATION/TRAINING PROGRAM | Admitting: STUDENT IN AN ORGANIZED HEALTH CARE EDUCATION/TRAINING PROGRAM
Payer: MEDICARE

## 2021-01-01 ENCOUNTER — EMERGENCY (EMERGENCY)
Facility: HOSPITAL | Age: 71
LOS: 0 days | Discharge: HOME | End: 2021-06-20
Attending: EMERGENCY MEDICINE | Admitting: EMERGENCY MEDICINE
Payer: MEDICARE

## 2021-01-01 ENCOUNTER — LABORATORY RESULT (OUTPATIENT)
Age: 71
End: 2021-01-01

## 2021-01-01 ENCOUNTER — INPATIENT (INPATIENT)
Facility: HOSPITAL | Age: 71
LOS: 3 days | Discharge: HOME | End: 2021-02-16
Attending: INTERNAL MEDICINE | Admitting: INTERNAL MEDICINE
Payer: MEDICARE

## 2021-01-01 ENCOUNTER — EMERGENCY (EMERGENCY)
Facility: HOSPITAL | Age: 71
LOS: 0 days | Discharge: HOME | End: 2021-03-16
Attending: EMERGENCY MEDICINE | Admitting: EMERGENCY MEDICINE
Payer: MEDICARE

## 2021-01-01 ENCOUNTER — TRANSCRIPTION ENCOUNTER (OUTPATIENT)
Age: 71
End: 2021-01-01

## 2021-01-01 ENCOUNTER — NON-APPOINTMENT (OUTPATIENT)
Age: 71
End: 2021-01-01

## 2021-01-01 ENCOUNTER — INPATIENT (INPATIENT)
Facility: HOSPITAL | Age: 71
LOS: 0 days | End: 2021-11-04
Attending: INTERNAL MEDICINE | Admitting: INTERNAL MEDICINE
Payer: MEDICARE

## 2021-01-01 ENCOUNTER — EMERGENCY (EMERGENCY)
Facility: HOSPITAL | Age: 71
LOS: 0 days | Discharge: HOME | End: 2021-07-15
Attending: EMERGENCY MEDICINE | Admitting: EMERGENCY MEDICINE
Payer: MEDICARE

## 2021-01-01 ENCOUNTER — EMERGENCY (EMERGENCY)
Facility: HOSPITAL | Age: 71
LOS: 0 days | Discharge: HOME | End: 2021-08-19
Attending: EMERGENCY MEDICINE | Admitting: EMERGENCY MEDICINE
Payer: MEDICARE

## 2021-01-01 ENCOUNTER — APPOINTMENT (OUTPATIENT)
Dept: GASTROENTEROLOGY | Facility: CLINIC | Age: 71
End: 2021-01-01
Payer: MEDICARE

## 2021-01-01 ENCOUNTER — EMERGENCY (EMERGENCY)
Facility: HOSPITAL | Age: 71
LOS: 0 days | Discharge: HOME | End: 2021-03-27
Attending: EMERGENCY MEDICINE | Admitting: EMERGENCY MEDICINE
Payer: MEDICARE

## 2021-01-01 ENCOUNTER — RESULT REVIEW (OUTPATIENT)
Age: 71
End: 2021-01-01

## 2021-01-01 ENCOUNTER — EMERGENCY (EMERGENCY)
Facility: HOSPITAL | Age: 71
LOS: 0 days | Discharge: HOME | End: 2021-08-05
Attending: EMERGENCY MEDICINE | Admitting: EMERGENCY MEDICINE
Payer: MEDICARE

## 2021-01-01 ENCOUNTER — EMERGENCY (EMERGENCY)
Facility: HOSPITAL | Age: 71
LOS: 0 days | Discharge: HOME | End: 2021-01-22
Attending: STUDENT IN AN ORGANIZED HEALTH CARE EDUCATION/TRAINING PROGRAM | Admitting: STUDENT IN AN ORGANIZED HEALTH CARE EDUCATION/TRAINING PROGRAM
Payer: MEDICARE

## 2021-01-01 VITALS
TEMPERATURE: 97 F | OXYGEN SATURATION: 96 % | RESPIRATION RATE: 19 BRPM | SYSTOLIC BLOOD PRESSURE: 104 MMHG | HEIGHT: 72 IN | DIASTOLIC BLOOD PRESSURE: 51 MMHG | HEART RATE: 70 BPM

## 2021-01-01 VITALS
RESPIRATION RATE: 22 BRPM | SYSTOLIC BLOOD PRESSURE: 117 MMHG | HEIGHT: 72 IN | HEART RATE: 62 BPM | DIASTOLIC BLOOD PRESSURE: 57 MMHG | OXYGEN SATURATION: 97 % | TEMPERATURE: 99 F

## 2021-01-01 VITALS
SYSTOLIC BLOOD PRESSURE: 102 MMHG | TEMPERATURE: 98 F | RESPIRATION RATE: 18 BRPM | DIASTOLIC BLOOD PRESSURE: 50 MMHG | HEART RATE: 81 BPM | OXYGEN SATURATION: 100 % | HEIGHT: 72 IN

## 2021-01-01 VITALS
TEMPERATURE: 97 F | DIASTOLIC BLOOD PRESSURE: 56 MMHG | RESPIRATION RATE: 16 BRPM | SYSTOLIC BLOOD PRESSURE: 113 MMHG | HEART RATE: 66 BPM | OXYGEN SATURATION: 98 %

## 2021-01-01 VITALS
TEMPERATURE: 97 F | SYSTOLIC BLOOD PRESSURE: 111 MMHG | OXYGEN SATURATION: 99 % | RESPIRATION RATE: 18 BRPM | DIASTOLIC BLOOD PRESSURE: 56 MMHG | HEART RATE: 69 BPM

## 2021-01-01 VITALS
RESPIRATION RATE: 18 BRPM | HEART RATE: 65 BPM | HEIGHT: 72 IN | DIASTOLIC BLOOD PRESSURE: 50 MMHG | SYSTOLIC BLOOD PRESSURE: 100 MMHG | OXYGEN SATURATION: 98 %

## 2021-01-01 VITALS
DIASTOLIC BLOOD PRESSURE: 58 MMHG | RESPIRATION RATE: 20 BRPM | OXYGEN SATURATION: 96 % | HEART RATE: 65 BPM | SYSTOLIC BLOOD PRESSURE: 104 MMHG | OXYGEN SATURATION: 100 % | WEIGHT: 250 LBS | HEART RATE: 65 BPM | SYSTOLIC BLOOD PRESSURE: 122 MMHG | RESPIRATION RATE: 17 BRPM | TEMPERATURE: 99 F | HEIGHT: 72 IN | DIASTOLIC BLOOD PRESSURE: 51 MMHG | TEMPERATURE: 98 F | WEIGHT: 250 LBS | HEIGHT: 72 IN

## 2021-01-01 VITALS
RESPIRATION RATE: 36 BRPM | HEART RATE: 69 BPM | TEMPERATURE: 97 F | DIASTOLIC BLOOD PRESSURE: 65 MMHG | HEIGHT: 72 IN | OXYGEN SATURATION: 80 % | SYSTOLIC BLOOD PRESSURE: 111 MMHG

## 2021-01-01 VITALS
HEART RATE: 65 BPM | WEIGHT: 250 LBS | HEIGHT: 72 IN | TEMPERATURE: 99 F | OXYGEN SATURATION: 99 % | DIASTOLIC BLOOD PRESSURE: 56 MMHG | RESPIRATION RATE: 18 BRPM | SYSTOLIC BLOOD PRESSURE: 116 MMHG

## 2021-01-01 VITALS — RESPIRATION RATE: 32 BRPM

## 2021-01-01 VITALS — SYSTOLIC BLOOD PRESSURE: 102 MMHG | DIASTOLIC BLOOD PRESSURE: 54 MMHG | HEART RATE: 65 BPM

## 2021-01-01 VITALS
SYSTOLIC BLOOD PRESSURE: 103 MMHG | OXYGEN SATURATION: 95 % | HEART RATE: 65 BPM | RESPIRATION RATE: 16 BRPM | TEMPERATURE: 98 F | DIASTOLIC BLOOD PRESSURE: 52 MMHG

## 2021-01-01 VITALS
TEMPERATURE: 98 F | DIASTOLIC BLOOD PRESSURE: 67 MMHG | OXYGEN SATURATION: 100 % | SYSTOLIC BLOOD PRESSURE: 126 MMHG | RESPIRATION RATE: 18 BRPM | HEART RATE: 60 BPM

## 2021-01-01 VITALS
HEART RATE: 65 BPM | DIASTOLIC BLOOD PRESSURE: 53 MMHG | OXYGEN SATURATION: 98 % | RESPIRATION RATE: 18 BRPM | SYSTOLIC BLOOD PRESSURE: 109 MMHG | TEMPERATURE: 98 F | WEIGHT: 257.06 LBS

## 2021-01-01 VITALS — RESPIRATION RATE: 17 BRPM | DIASTOLIC BLOOD PRESSURE: 57 MMHG | HEART RATE: 54 BPM | SYSTOLIC BLOOD PRESSURE: 106 MMHG

## 2021-01-01 VITALS
OXYGEN SATURATION: 96 % | HEART RATE: 86 BPM | TEMPERATURE: 98 F | WEIGHT: 259.93 LBS | DIASTOLIC BLOOD PRESSURE: 51 MMHG | RESPIRATION RATE: 17 BRPM | HEIGHT: 72 IN | SYSTOLIC BLOOD PRESSURE: 96 MMHG

## 2021-01-01 VITALS
HEIGHT: 72 IN | OXYGEN SATURATION: 97 % | HEART RATE: 65 BPM | DIASTOLIC BLOOD PRESSURE: 53 MMHG | TEMPERATURE: 99 F | RESPIRATION RATE: 18 BRPM | SYSTOLIC BLOOD PRESSURE: 105 MMHG

## 2021-01-01 VITALS
OXYGEN SATURATION: 100 % | SYSTOLIC BLOOD PRESSURE: 108 MMHG | DIASTOLIC BLOOD PRESSURE: 62 MMHG | HEART RATE: 66 BPM | TEMPERATURE: 98 F | RESPIRATION RATE: 18 BRPM

## 2021-01-01 VITALS
TEMPERATURE: 97 F | HEART RATE: 66 BPM | SYSTOLIC BLOOD PRESSURE: 101 MMHG | RESPIRATION RATE: 20 BRPM | DIASTOLIC BLOOD PRESSURE: 52 MMHG | OXYGEN SATURATION: 100 % | WEIGHT: 250 LBS | HEIGHT: 72 IN

## 2021-01-01 VITALS
SYSTOLIC BLOOD PRESSURE: 133 MMHG | OXYGEN SATURATION: 98 % | TEMPERATURE: 98 F | RESPIRATION RATE: 18 BRPM | HEART RATE: 64 BPM | DIASTOLIC BLOOD PRESSURE: 64 MMHG

## 2021-01-01 VITALS
TEMPERATURE: 99 F | RESPIRATION RATE: 18 BRPM | OXYGEN SATURATION: 98 % | SYSTOLIC BLOOD PRESSURE: 108 MMHG | HEART RATE: 66 BPM | DIASTOLIC BLOOD PRESSURE: 51 MMHG | HEIGHT: 72 IN

## 2021-01-01 VITALS — HEART RATE: 64 BPM | TEMPERATURE: 98 F | DIASTOLIC BLOOD PRESSURE: 53 MMHG | SYSTOLIC BLOOD PRESSURE: 112 MMHG

## 2021-01-01 VITALS
HEART RATE: 67 BPM | RESPIRATION RATE: 18 BRPM | OXYGEN SATURATION: 99 % | TEMPERATURE: 97 F | SYSTOLIC BLOOD PRESSURE: 125 MMHG | DIASTOLIC BLOOD PRESSURE: 67 MMHG

## 2021-01-01 VITALS
HEART RATE: 65 BPM | OXYGEN SATURATION: 100 % | SYSTOLIC BLOOD PRESSURE: 106 MMHG | RESPIRATION RATE: 18 BRPM | TEMPERATURE: 98 F | DIASTOLIC BLOOD PRESSURE: 56 MMHG

## 2021-01-01 DIAGNOSIS — Z95.2 PRESENCE OF PROSTHETIC HEART VALVE: Chronic | ICD-10-CM

## 2021-01-01 DIAGNOSIS — Z95.0 PRESENCE OF CARDIAC PACEMAKER: Chronic | ICD-10-CM

## 2021-01-01 DIAGNOSIS — I77.0 ARTERIOVENOUS FISTULA, ACQUIRED: Chronic | ICD-10-CM

## 2021-01-01 DIAGNOSIS — E11.22 TYPE 2 DIABETES MELLITUS WITH DIABETIC CHRONIC KIDNEY DISEASE: ICD-10-CM

## 2021-01-01 DIAGNOSIS — I51.7 CARDIOMEGALY: ICD-10-CM

## 2021-01-01 DIAGNOSIS — Z99.2 DEPENDENCE ON RENAL DIALYSIS: ICD-10-CM

## 2021-01-01 DIAGNOSIS — N18.6 END STAGE RENAL DISEASE: ICD-10-CM

## 2021-01-01 DIAGNOSIS — G47.33 OBSTRUCTIVE SLEEP APNEA (ADULT) (PEDIATRIC): ICD-10-CM

## 2021-01-01 DIAGNOSIS — Z95.0 PRESENCE OF CARDIAC PACEMAKER: ICD-10-CM

## 2021-01-01 DIAGNOSIS — D53.9 NUTRITIONAL ANEMIA, UNSPECIFIED: ICD-10-CM

## 2021-01-01 DIAGNOSIS — I12.0 HYPERTENSIVE CHRONIC KIDNEY DISEASE WITH STAGE 5 CHRONIC KIDNEY DISEASE OR END STAGE RENAL DISEASE: ICD-10-CM

## 2021-01-01 DIAGNOSIS — Z87.09 PERSONAL HISTORY OF OTHER DISEASES OF THE RESPIRATORY SYSTEM: ICD-10-CM

## 2021-01-01 DIAGNOSIS — Z11.59 ENCOUNTER FOR SCREENING FOR OTHER VIRAL DISEASES: ICD-10-CM

## 2021-01-01 DIAGNOSIS — K92.2 GASTROINTESTINAL HEMORRHAGE, UNSPECIFIED: ICD-10-CM

## 2021-01-01 DIAGNOSIS — R31.9 HEMATURIA, UNSPECIFIED: ICD-10-CM

## 2021-01-01 DIAGNOSIS — D63.1 ANEMIA IN CHRONIC KIDNEY DISEASE: ICD-10-CM

## 2021-01-01 DIAGNOSIS — R53.1 WEAKNESS: ICD-10-CM

## 2021-01-01 DIAGNOSIS — D64.9 ANEMIA, UNSPECIFIED: ICD-10-CM

## 2021-01-01 DIAGNOSIS — K74.60 UNSPECIFIED CIRRHOSIS OF LIVER: ICD-10-CM

## 2021-01-01 DIAGNOSIS — R10.31 RIGHT LOWER QUADRANT PAIN: ICD-10-CM

## 2021-01-01 DIAGNOSIS — I12.9 HYPERTENSIVE CHRONIC KIDNEY DISEASE WITH STAGE 1 THROUGH STAGE 4 CHRONIC KIDNEY DISEASE, OR UNSPECIFIED CHRONIC KIDNEY DISEASE: ICD-10-CM

## 2021-01-01 DIAGNOSIS — E11.9 TYPE 2 DIABETES MELLITUS WITHOUT COMPLICATIONS: ICD-10-CM

## 2021-01-01 DIAGNOSIS — Z79.4 LONG TERM (CURRENT) USE OF INSULIN: ICD-10-CM

## 2021-01-01 DIAGNOSIS — R79.9 ABNORMAL FINDING OF BLOOD CHEMISTRY, UNSPECIFIED: ICD-10-CM

## 2021-01-01 DIAGNOSIS — N28.9 DISORDER OF KIDNEY AND URETER, UNSPECIFIED: ICD-10-CM

## 2021-01-01 DIAGNOSIS — R03.1 NONSPECIFIC LOW BLOOD-PRESSURE READING: ICD-10-CM

## 2021-01-01 DIAGNOSIS — K31.811 ANGIODYSPLASIA OF STOMACH AND DUODENUM WITH BLEEDING: ICD-10-CM

## 2021-01-01 DIAGNOSIS — Z95.2 PRESENCE OF PROSTHETIC HEART VALVE: ICD-10-CM

## 2021-01-01 DIAGNOSIS — K76.6 PORTAL HYPERTENSION: ICD-10-CM

## 2021-01-01 DIAGNOSIS — I48.91 UNSPECIFIED ATRIAL FIBRILLATION: ICD-10-CM

## 2021-01-01 DIAGNOSIS — N39.0 URINARY TRACT INFECTION, SITE NOT SPECIFIED: ICD-10-CM

## 2021-01-01 DIAGNOSIS — Z79.51 LONG TERM (CURRENT) USE OF INHALED STEROIDS: ICD-10-CM

## 2021-01-01 DIAGNOSIS — E78.5 HYPERLIPIDEMIA, UNSPECIFIED: ICD-10-CM

## 2021-01-01 DIAGNOSIS — R19.5 OTHER FECAL ABNORMALITIES: ICD-10-CM

## 2021-01-01 DIAGNOSIS — Z87.891 PERSONAL HISTORY OF NICOTINE DEPENDENCE: ICD-10-CM

## 2021-01-01 DIAGNOSIS — K70.31 ALCOHOLIC CIRRHOSIS OF LIVER WITH ASCITES: ICD-10-CM

## 2021-01-01 DIAGNOSIS — I13.2 HYPERTENSIVE HEART AND CHRONIC KIDNEY DISEASE WITH HEART FAILURE AND WITH STAGE 5 CHRONIC KIDNEY DISEASE, OR END STAGE RENAL DISEASE: ICD-10-CM

## 2021-01-01 DIAGNOSIS — Z79.899 OTHER LONG TERM (CURRENT) DRUG THERAPY: ICD-10-CM

## 2021-01-01 DIAGNOSIS — Z79.84 LONG TERM (CURRENT) USE OF ORAL HYPOGLYCEMIC DRUGS: ICD-10-CM

## 2021-01-01 DIAGNOSIS — G47.00 INSOMNIA, UNSPECIFIED: ICD-10-CM

## 2021-01-01 DIAGNOSIS — Z87.898 PERSONAL HISTORY OF OTHER SPECIFIED CONDITIONS: ICD-10-CM

## 2021-01-01 DIAGNOSIS — Z87.19 PERSONAL HISTORY OF OTHER DISEASES OF THE DIGESTIVE SYSTEM: ICD-10-CM

## 2021-01-01 DIAGNOSIS — K31.7 POLYP OF STOMACH AND DUODENUM: ICD-10-CM

## 2021-01-01 DIAGNOSIS — K70.30 ALCOHOLIC CIRRHOSIS OF LIVER WITHOUT ASCITES: ICD-10-CM

## 2021-01-01 DIAGNOSIS — R71.0 PRECIPITOUS DROP IN HEMATOCRIT: ICD-10-CM

## 2021-01-01 DIAGNOSIS — Z79.01 LONG TERM (CURRENT) USE OF ANTICOAGULANTS: ICD-10-CM

## 2021-01-01 DIAGNOSIS — R16.1 SPLENOMEGALY, NOT ELSEWHERE CLASSIFIED: ICD-10-CM

## 2021-01-01 DIAGNOSIS — R79.89 OTHER SPECIFIED ABNORMAL FINDINGS OF BLOOD CHEMISTRY: ICD-10-CM

## 2021-01-01 DIAGNOSIS — Z99.89 DEPENDENCE ON OTHER ENABLING MACHINES AND DEVICES: ICD-10-CM

## 2021-01-01 DIAGNOSIS — K29.70 GASTRITIS, UNSPECIFIED, WITHOUT BLEEDING: ICD-10-CM

## 2021-01-01 DIAGNOSIS — N18.9 CHRONIC KIDNEY DISEASE, UNSPECIFIED: ICD-10-CM

## 2021-01-01 DIAGNOSIS — I50.23 ACUTE ON CHRONIC SYSTOLIC (CONGESTIVE) HEART FAILURE: ICD-10-CM

## 2021-01-01 DIAGNOSIS — K70.30 ALCOHOLIC CIRRHOSIS OF LIVER W/OUT ASCITES: ICD-10-CM

## 2021-01-01 DIAGNOSIS — Z95.4 PRESENCE OF OTHER HEART-VALVE REPLACEMENT: ICD-10-CM

## 2021-01-01 DIAGNOSIS — Z86.79 PERSONAL HISTORY OF OTHER DISEASES OF THE CIRCULATORY SYSTEM: ICD-10-CM

## 2021-01-01 DIAGNOSIS — D73.89 OTHER DISEASES OF SPLEEN: ICD-10-CM

## 2021-01-01 DIAGNOSIS — N40.0 BENIGN PROSTATIC HYPERPLASIA WITHOUT LOWER URINARY TRACT SYMPTOMS: ICD-10-CM

## 2021-01-01 DIAGNOSIS — Z99.81 DEPENDENCE ON SUPPLEMENTAL OXYGEN: ICD-10-CM

## 2021-01-01 DIAGNOSIS — I87.8 OTHER SPECIFIED DISORDERS OF VEINS: ICD-10-CM

## 2021-01-01 DIAGNOSIS — D62 ACUTE POSTHEMORRHAGIC ANEMIA: ICD-10-CM

## 2021-01-01 DIAGNOSIS — R06.09 OTHER FORMS OF DYSPNEA: ICD-10-CM

## 2021-01-01 LAB
ACANTHOCYTES BLD QL SMEAR: SLIGHT — SIGNIFICANT CHANGE UP
AGGLUTINATION: PRESENT — SIGNIFICANT CHANGE UP
ALBUMIN SERPL ELPH-MCNC: 2.7 G/DL — LOW (ref 3.5–5.2)
ALBUMIN SERPL ELPH-MCNC: 3 G/DL — LOW (ref 3.5–5.2)
ALBUMIN SERPL ELPH-MCNC: 3.2 G/DL — LOW (ref 3.5–5.2)
ALBUMIN SERPL ELPH-MCNC: 3.5 G/DL — SIGNIFICANT CHANGE UP (ref 3.5–5.2)
ALBUMIN SERPL ELPH-MCNC: 3.6 G/DL — SIGNIFICANT CHANGE UP (ref 3.5–5.2)
ALBUMIN SERPL ELPH-MCNC: 3.7 G/DL — SIGNIFICANT CHANGE UP (ref 3.5–5.2)
ALBUMIN SERPL ELPH-MCNC: 3.7 G/DL — SIGNIFICANT CHANGE UP (ref 3.5–5.2)
ALBUMIN SERPL ELPH-MCNC: 3.8 G/DL — SIGNIFICANT CHANGE UP (ref 3.5–5.2)
ALBUMIN SERPL ELPH-MCNC: 3.9 G/DL — SIGNIFICANT CHANGE UP (ref 3.5–5.2)
ALP SERPL-CCNC: 209 U/L — HIGH (ref 30–115)
ALP SERPL-CCNC: 227 U/L — HIGH (ref 30–115)
ALP SERPL-CCNC: 228 U/L — HIGH (ref 30–115)
ALP SERPL-CCNC: 245 U/L — HIGH (ref 30–115)
ALP SERPL-CCNC: 299 U/L — HIGH (ref 30–115)
ALP SERPL-CCNC: 339 U/L — HIGH (ref 30–115)
ALP SERPL-CCNC: 354 U/L — HIGH (ref 30–115)
ALP SERPL-CCNC: 355 U/L — HIGH (ref 30–115)
ALP SERPL-CCNC: 369 U/L — HIGH (ref 30–115)
ALP SERPL-CCNC: 382 U/L — HIGH (ref 30–115)
ALP SERPL-CCNC: 387 U/L — HIGH (ref 30–115)
ALP SERPL-CCNC: 389 U/L — HIGH (ref 30–115)
ALP SERPL-CCNC: 399 U/L — HIGH (ref 30–115)
ALP SERPL-CCNC: 400 U/L — HIGH (ref 30–115)
ALP SERPL-CCNC: 422 U/L — HIGH (ref 30–115)
ALP SERPL-CCNC: 444 U/L — HIGH (ref 30–115)
ALP SERPL-CCNC: 462 U/L — HIGH (ref 30–115)
ALT FLD-CCNC: 10 U/L — SIGNIFICANT CHANGE UP (ref 0–41)
ALT FLD-CCNC: 11 U/L — SIGNIFICANT CHANGE UP (ref 0–41)
ALT FLD-CCNC: 12 U/L — SIGNIFICANT CHANGE UP (ref 0–41)
ALT FLD-CCNC: 13 U/L — SIGNIFICANT CHANGE UP (ref 0–41)
ALT FLD-CCNC: 16 U/L — SIGNIFICANT CHANGE UP (ref 0–41)
ALT FLD-CCNC: 29 U/L — SIGNIFICANT CHANGE UP (ref 0–41)
ALT FLD-CCNC: 52 U/L — HIGH (ref 0–41)
ALT FLD-CCNC: 74 U/L — HIGH (ref 0–41)
ALT FLD-CCNC: 8 U/L — SIGNIFICANT CHANGE UP (ref 0–41)
ALT FLD-CCNC: 9 U/L — SIGNIFICANT CHANGE UP (ref 0–41)
ALT FLD-CCNC: 9 U/L — SIGNIFICANT CHANGE UP (ref 0–41)
AMMONIA BLD-MCNC: 86 UMOL/L — HIGH (ref 11–55)
ANION GAP SERPL CALC-SCNC: 10 MMOL/L — SIGNIFICANT CHANGE UP (ref 7–14)
ANION GAP SERPL CALC-SCNC: 11 MMOL/L — SIGNIFICANT CHANGE UP (ref 7–14)
ANION GAP SERPL CALC-SCNC: 11 MMOL/L — SIGNIFICANT CHANGE UP (ref 7–14)
ANION GAP SERPL CALC-SCNC: 12 MMOL/L — SIGNIFICANT CHANGE UP (ref 7–14)
ANION GAP SERPL CALC-SCNC: 13 MMOL/L — SIGNIFICANT CHANGE UP (ref 7–14)
ANION GAP SERPL CALC-SCNC: 14 MMOL/L — SIGNIFICANT CHANGE UP (ref 7–14)
ANION GAP SERPL CALC-SCNC: 15 MMOL/L — HIGH (ref 7–14)
ANION GAP SERPL CALC-SCNC: 17 MMOL/L — HIGH (ref 7–14)
ANION GAP SERPL CALC-SCNC: 17 MMOL/L — HIGH (ref 7–14)
ANION GAP SERPL CALC-SCNC: 19 MMOL/L — HIGH (ref 7–14)
ANION GAP SERPL CALC-SCNC: 20 MMOL/L — HIGH (ref 7–14)
ANION GAP SERPL CALC-SCNC: 22 MMOL/L — HIGH (ref 7–14)
ANION GAP SERPL CALC-SCNC: 27 MMOL/L — HIGH (ref 7–14)
ANION GAP SERPL CALC-SCNC: 7 MMOL/L — SIGNIFICANT CHANGE UP (ref 7–14)
ANION GAP SERPL CALC-SCNC: 9 MMOL/L — SIGNIFICANT CHANGE UP (ref 7–14)
ANISOCYTOSIS BLD QL: SIGNIFICANT CHANGE UP
ANISOCYTOSIS BLD QL: SIGNIFICANT CHANGE UP
ANISOCYTOSIS BLD QL: SLIGHT — SIGNIFICANT CHANGE UP
APPEARANCE UR: ABNORMAL
APTT BLD: 31.2 SEC — SIGNIFICANT CHANGE UP (ref 27–39.2)
APTT BLD: 32.5 SEC — SIGNIFICANT CHANGE UP (ref 27–39.2)
APTT BLD: 32.6 SEC — SIGNIFICANT CHANGE UP (ref 27–39.2)
APTT BLD: 33.6 SEC — SIGNIFICANT CHANGE UP (ref 27–39.2)
APTT BLD: 35.4 SEC — SIGNIFICANT CHANGE UP (ref 27–39.2)
AST SERPL-CCNC: 116 U/L — HIGH (ref 0–41)
AST SERPL-CCNC: 177 U/L — HIGH (ref 0–41)
AST SERPL-CCNC: 24 U/L — SIGNIFICANT CHANGE UP (ref 0–41)
AST SERPL-CCNC: 24 U/L — SIGNIFICANT CHANGE UP (ref 0–41)
AST SERPL-CCNC: 25 U/L — SIGNIFICANT CHANGE UP (ref 0–41)
AST SERPL-CCNC: 26 U/L — SIGNIFICANT CHANGE UP (ref 0–41)
AST SERPL-CCNC: 26 U/L — SIGNIFICANT CHANGE UP (ref 0–41)
AST SERPL-CCNC: 28 U/L — SIGNIFICANT CHANGE UP (ref 0–41)
AST SERPL-CCNC: 29 U/L — SIGNIFICANT CHANGE UP (ref 0–41)
AST SERPL-CCNC: 29 U/L — SIGNIFICANT CHANGE UP (ref 0–41)
AST SERPL-CCNC: 30 U/L — SIGNIFICANT CHANGE UP (ref 0–41)
AST SERPL-CCNC: 32 U/L — SIGNIFICANT CHANGE UP (ref 0–41)
AST SERPL-CCNC: 33 U/L — SIGNIFICANT CHANGE UP (ref 0–41)
AST SERPL-CCNC: 34 U/L — SIGNIFICANT CHANGE UP (ref 0–41)
AST SERPL-CCNC: 35 U/L — SIGNIFICANT CHANGE UP (ref 0–41)
AST SERPL-CCNC: 35 U/L — SIGNIFICANT CHANGE UP (ref 0–41)
AST SERPL-CCNC: 39 U/L — SIGNIFICANT CHANGE UP (ref 0–41)
BASE EXCESS BLDV CALC-SCNC: 0.5 MMOL/L — SIGNIFICANT CHANGE UP (ref -2–3)
BASE EXCESS BLDV CALC-SCNC: 1.1 MMOL/L — SIGNIFICANT CHANGE UP (ref -2–3)
BASE EXCESS BLDV CALC-SCNC: 13.9 MMOL/L — HIGH (ref -2–2)
BASOPHILS # BLD AUTO: 0 K/UL — SIGNIFICANT CHANGE UP (ref 0–0.2)
BASOPHILS # BLD AUTO: 0.01 K/UL — SIGNIFICANT CHANGE UP (ref 0–0.2)
BASOPHILS # BLD AUTO: 0.01 K/UL — SIGNIFICANT CHANGE UP (ref 0–0.2)
BASOPHILS # BLD AUTO: 0.02 K/UL — SIGNIFICANT CHANGE UP (ref 0–0.2)
BASOPHILS # BLD AUTO: 0.03 K/UL — SIGNIFICANT CHANGE UP (ref 0–0.2)
BASOPHILS # BLD AUTO: 0.04 K/UL — SIGNIFICANT CHANGE UP (ref 0–0.2)
BASOPHILS # BLD AUTO: 0.05 K/UL — SIGNIFICANT CHANGE UP (ref 0–0.2)
BASOPHILS # BLD AUTO: 0.06 K/UL — SIGNIFICANT CHANGE UP (ref 0–0.2)
BASOPHILS # BLD AUTO: 0.08 K/UL — SIGNIFICANT CHANGE UP (ref 0–0.2)
BASOPHILS # BLD AUTO: 0.09 K/UL — SIGNIFICANT CHANGE UP (ref 0–0.2)
BASOPHILS # BLD AUTO: 0.1 K/UL — SIGNIFICANT CHANGE UP (ref 0–0.2)
BASOPHILS # BLD AUTO: 0.12 K/UL — SIGNIFICANT CHANGE UP (ref 0–0.2)
BASOPHILS NFR BLD AUTO: 0 % — SIGNIFICANT CHANGE UP (ref 0–1)
BASOPHILS NFR BLD AUTO: 0.1 % — SIGNIFICANT CHANGE UP (ref 0–1)
BASOPHILS NFR BLD AUTO: 0.3 % — SIGNIFICANT CHANGE UP (ref 0–1)
BASOPHILS NFR BLD AUTO: 0.5 % — SIGNIFICANT CHANGE UP (ref 0–1)
BASOPHILS NFR BLD AUTO: 0.7 % — SIGNIFICANT CHANGE UP (ref 0–1)
BASOPHILS NFR BLD AUTO: 0.9 % — SIGNIFICANT CHANGE UP (ref 0–1)
BASOPHILS NFR BLD AUTO: 0.9 % — SIGNIFICANT CHANGE UP (ref 0–1)
BASOPHILS NFR BLD AUTO: 1 % — SIGNIFICANT CHANGE UP (ref 0–1)
BASOPHILS NFR BLD AUTO: 1.3 % — HIGH (ref 0–1)
BASOPHILS NFR BLD AUTO: 1.4 % — HIGH (ref 0–1)
BASOPHILS NFR BLD AUTO: 1.5 % — HIGH (ref 0–1)
BASOPHILS NFR BLD AUTO: 1.7 % — HIGH (ref 0–1)
BASOPHILS NFR BLD AUTO: 1.8 % — HIGH (ref 0–1)
BASOPHILS NFR BLD AUTO: 2.6 % — HIGH (ref 0–1)
BILIRUB SERPL-MCNC: 0.7 MG/DL — SIGNIFICANT CHANGE UP (ref 0.2–1.2)
BILIRUB SERPL-MCNC: 0.8 MG/DL — SIGNIFICANT CHANGE UP (ref 0.2–1.2)
BILIRUB SERPL-MCNC: 0.9 MG/DL — SIGNIFICANT CHANGE UP (ref 0.2–1.2)
BILIRUB SERPL-MCNC: 1 MG/DL — SIGNIFICANT CHANGE UP (ref 0.2–1.2)
BILIRUB SERPL-MCNC: 1 MG/DL — SIGNIFICANT CHANGE UP (ref 0.2–1.2)
BILIRUB SERPL-MCNC: 1.1 MG/DL — SIGNIFICANT CHANGE UP (ref 0.2–1.2)
BILIRUB SERPL-MCNC: 1.2 MG/DL — SIGNIFICANT CHANGE UP (ref 0.2–1.2)
BILIRUB SERPL-MCNC: 1.2 MG/DL — SIGNIFICANT CHANGE UP (ref 0.2–1.2)
BILIRUB SERPL-MCNC: 3.1 MG/DL — HIGH (ref 0.2–1.2)
BILIRUB SERPL-MCNC: 3.5 MG/DL — HIGH (ref 0.2–1.2)
BILIRUB SERPL-MCNC: 3.8 MG/DL — HIGH (ref 0.2–1.2)
BILIRUB UR-MCNC: NEGATIVE — SIGNIFICANT CHANGE UP
BLD GP AB SCN SERPL QL: SIGNIFICANT CHANGE UP
BUN SERPL-MCNC: 17 MG/DL — SIGNIFICANT CHANGE UP (ref 10–20)
BUN SERPL-MCNC: 21 MG/DL — HIGH (ref 10–20)
BUN SERPL-MCNC: 24 MG/DL — HIGH (ref 10–20)
BUN SERPL-MCNC: 29 MG/DL — HIGH (ref 10–20)
BUN SERPL-MCNC: 35 MG/DL — HIGH (ref 10–20)
BUN SERPL-MCNC: 37 MG/DL — HIGH (ref 10–20)
BUN SERPL-MCNC: 38 MG/DL — HIGH (ref 10–20)
BUN SERPL-MCNC: 38 MG/DL — HIGH (ref 10–20)
BUN SERPL-MCNC: 40 MG/DL — HIGH (ref 10–20)
BUN SERPL-MCNC: 42 MG/DL — HIGH (ref 10–20)
BUN SERPL-MCNC: 43 MG/DL — HIGH (ref 10–20)
BUN SERPL-MCNC: 47 MG/DL — HIGH (ref 10–20)
BUN SERPL-MCNC: 50 MG/DL — HIGH (ref 10–20)
BUN SERPL-MCNC: 53 MG/DL — HIGH (ref 10–20)
BUN SERPL-MCNC: 54 MG/DL — HIGH (ref 10–20)
BUN SERPL-MCNC: 75 MG/DL — CRITICAL HIGH (ref 10–20)
BURR CELLS BLD QL SMEAR: PRESENT — SIGNIFICANT CHANGE UP
CA-I SERPL-SCNC: 1.11 MMOL/L — LOW (ref 1.15–1.33)
CA-I SERPL-SCNC: 1.12 MMOL/L — SIGNIFICANT CHANGE UP (ref 1.12–1.3)
CA-I SERPL-SCNC: 1.14 MMOL/L — LOW (ref 1.15–1.33)
CALCIUM SERPL-MCNC: 7.4 MG/DL — LOW (ref 8.5–10.1)
CALCIUM SERPL-MCNC: 7.5 MG/DL — LOW (ref 8.5–10.1)
CALCIUM SERPL-MCNC: 7.6 MG/DL — LOW (ref 8.5–10.1)
CALCIUM SERPL-MCNC: 8.5 MG/DL — SIGNIFICANT CHANGE UP (ref 8.5–10.1)
CALCIUM SERPL-MCNC: 8.6 MG/DL — SIGNIFICANT CHANGE UP (ref 8.5–10.1)
CALCIUM SERPL-MCNC: 8.6 MG/DL — SIGNIFICANT CHANGE UP (ref 8.5–10.1)
CALCIUM SERPL-MCNC: 8.7 MG/DL — SIGNIFICANT CHANGE UP (ref 8.5–10.1)
CALCIUM SERPL-MCNC: 8.7 MG/DL — SIGNIFICANT CHANGE UP (ref 8.5–10.1)
CALCIUM SERPL-MCNC: 8.8 MG/DL — SIGNIFICANT CHANGE UP (ref 8.5–10.1)
CALCIUM SERPL-MCNC: 8.8 MG/DL — SIGNIFICANT CHANGE UP (ref 8.5–10.1)
CALCIUM SERPL-MCNC: 8.9 MG/DL — SIGNIFICANT CHANGE UP (ref 8.5–10.1)
CALCIUM SERPL-MCNC: 8.9 MG/DL — SIGNIFICANT CHANGE UP (ref 8.5–10.1)
CALCIUM SERPL-MCNC: 9 MG/DL — SIGNIFICANT CHANGE UP (ref 8.5–10.1)
CALCIUM SERPL-MCNC: 9.1 MG/DL — SIGNIFICANT CHANGE UP (ref 8.5–10.1)
CALCIUM SERPL-MCNC: 9.2 MG/DL — SIGNIFICANT CHANGE UP (ref 8.5–10.1)
CALCIUM SERPL-MCNC: 9.3 MG/DL — SIGNIFICANT CHANGE UP (ref 8.5–10.1)
CHLORIDE SERPL-SCNC: 100 MMOL/L — SIGNIFICANT CHANGE UP (ref 98–110)
CHLORIDE SERPL-SCNC: 101 MMOL/L — SIGNIFICANT CHANGE UP (ref 98–110)
CHLORIDE SERPL-SCNC: 103 MMOL/L — SIGNIFICANT CHANGE UP (ref 98–110)
CHLORIDE SERPL-SCNC: 88 MMOL/L — LOW (ref 98–110)
CHLORIDE SERPL-SCNC: 89 MMOL/L — LOW (ref 98–110)
CHLORIDE SERPL-SCNC: 90 MMOL/L — LOW (ref 98–110)
CHLORIDE SERPL-SCNC: 90 MMOL/L — LOW (ref 98–110)
CHLORIDE SERPL-SCNC: 91 MMOL/L — LOW (ref 98–110)
CHLORIDE SERPL-SCNC: 91 MMOL/L — LOW (ref 98–110)
CHLORIDE SERPL-SCNC: 92 MMOL/L — LOW (ref 98–110)
CHLORIDE SERPL-SCNC: 93 MMOL/L — LOW (ref 98–110)
CHLORIDE SERPL-SCNC: 94 MMOL/L — LOW (ref 98–110)
CHLORIDE SERPL-SCNC: 94 MMOL/L — LOW (ref 98–110)
CHLORIDE SERPL-SCNC: 96 MMOL/L — LOW (ref 98–110)
CO2 SERPL-SCNC: 14 MMOL/L — LOW (ref 17–32)
CO2 SERPL-SCNC: 22 MMOL/L — SIGNIFICANT CHANGE UP (ref 17–32)
CO2 SERPL-SCNC: 22 MMOL/L — SIGNIFICANT CHANGE UP (ref 17–32)
CO2 SERPL-SCNC: 23 MMOL/L — SIGNIFICANT CHANGE UP (ref 17–32)
CO2 SERPL-SCNC: 24 MMOL/L — SIGNIFICANT CHANGE UP (ref 17–32)
CO2 SERPL-SCNC: 27 MMOL/L — SIGNIFICANT CHANGE UP (ref 17–32)
CO2 SERPL-SCNC: 27 MMOL/L — SIGNIFICANT CHANGE UP (ref 17–32)
CO2 SERPL-SCNC: 29 MMOL/L — SIGNIFICANT CHANGE UP (ref 17–32)
CO2 SERPL-SCNC: 30 MMOL/L — SIGNIFICANT CHANGE UP (ref 17–32)
CO2 SERPL-SCNC: 31 MMOL/L — SIGNIFICANT CHANGE UP (ref 17–32)
CO2 SERPL-SCNC: 32 MMOL/L — SIGNIFICANT CHANGE UP (ref 17–32)
CO2 SERPL-SCNC: 32 MMOL/L — SIGNIFICANT CHANGE UP (ref 17–32)
CO2 SERPL-SCNC: 34 MMOL/L — HIGH (ref 17–32)
CO2 SERPL-SCNC: 34 MMOL/L — HIGH (ref 17–32)
CO2 SERPL-SCNC: 35 MMOL/L — HIGH (ref 17–32)
CO2 SERPL-SCNC: 35 MMOL/L — HIGH (ref 17–32)
COLOR SPEC: ABNORMAL
CREAT SERPL-MCNC: 2.9 MG/DL — HIGH (ref 0.7–1.5)
CREAT SERPL-MCNC: 3.5 MG/DL — HIGH (ref 0.7–1.5)
CREAT SERPL-MCNC: 3.7 MG/DL — HIGH (ref 0.7–1.5)
CREAT SERPL-MCNC: 4 MG/DL — HIGH (ref 0.7–1.5)
CREAT SERPL-MCNC: 4 MG/DL — HIGH (ref 0.7–1.5)
CREAT SERPL-MCNC: 4.7 MG/DL — CRITICAL HIGH (ref 0.7–1.5)
CREAT SERPL-MCNC: 5.2 MG/DL — CRITICAL HIGH (ref 0.7–1.5)
CREAT SERPL-MCNC: 5.4 MG/DL — CRITICAL HIGH (ref 0.7–1.5)
CREAT SERPL-MCNC: 5.5 MG/DL — CRITICAL HIGH (ref 0.7–1.5)
CREAT SERPL-MCNC: 5.6 MG/DL — CRITICAL HIGH (ref 0.7–1.5)
CREAT SERPL-MCNC: 5.7 MG/DL — CRITICAL HIGH (ref 0.7–1.5)
CREAT SERPL-MCNC: 5.8 MG/DL — CRITICAL HIGH (ref 0.7–1.5)
CREAT SERPL-MCNC: 5.9 MG/DL — CRITICAL HIGH (ref 0.7–1.5)
CREAT SERPL-MCNC: 6.2 MG/DL — CRITICAL HIGH (ref 0.7–1.5)
CREAT SERPL-MCNC: 6.3 MG/DL — CRITICAL HIGH (ref 0.7–1.5)
CREAT SERPL-MCNC: 7.7 MG/DL — CRITICAL HIGH (ref 0.7–1.5)
D DIMER BLD IA.RAPID-MCNC: 2363 NG/ML DDU — HIGH (ref 0–230)
DACRYOCYTES BLD QL SMEAR: SLIGHT — SIGNIFICANT CHANGE UP
DIFF PNL FLD: ABNORMAL
DIR ANTIGLOB POLYSPECIFIC INTERPRETATION: SIGNIFICANT CHANGE UP
ELLIPTOCYTES BLD QL SMEAR: SLIGHT — SIGNIFICANT CHANGE UP
EOSINOPHIL # BLD AUTO: 0 K/UL — SIGNIFICANT CHANGE UP (ref 0–0.7)
EOSINOPHIL # BLD AUTO: 0.08 K/UL — SIGNIFICANT CHANGE UP (ref 0–0.7)
EOSINOPHIL # BLD AUTO: 0.11 K/UL — SIGNIFICANT CHANGE UP (ref 0–0.7)
EOSINOPHIL # BLD AUTO: 0.16 K/UL — SIGNIFICANT CHANGE UP (ref 0–0.7)
EOSINOPHIL # BLD AUTO: 0.18 K/UL — SIGNIFICANT CHANGE UP (ref 0–0.7)
EOSINOPHIL # BLD AUTO: 0.21 K/UL — SIGNIFICANT CHANGE UP (ref 0–0.7)
EOSINOPHIL # BLD AUTO: 0.26 K/UL — SIGNIFICANT CHANGE UP (ref 0–0.7)
EOSINOPHIL # BLD AUTO: 0.31 K/UL — SIGNIFICANT CHANGE UP (ref 0–0.7)
EOSINOPHIL # BLD AUTO: 0.32 K/UL — SIGNIFICANT CHANGE UP (ref 0–0.7)
EOSINOPHIL # BLD AUTO: 0.35 K/UL — SIGNIFICANT CHANGE UP (ref 0–0.7)
EOSINOPHIL # BLD AUTO: 0.4 K/UL — SIGNIFICANT CHANGE UP (ref 0–0.7)
EOSINOPHIL # BLD AUTO: 0.4 K/UL — SIGNIFICANT CHANGE UP (ref 0–0.7)
EOSINOPHIL # BLD AUTO: 0.44 K/UL — SIGNIFICANT CHANGE UP (ref 0–0.7)
EOSINOPHIL # BLD AUTO: 0.46 K/UL — SIGNIFICANT CHANGE UP (ref 0–0.7)
EOSINOPHIL # BLD AUTO: 0.52 K/UL — SIGNIFICANT CHANGE UP (ref 0–0.7)
EOSINOPHIL # BLD AUTO: 0.61 K/UL — SIGNIFICANT CHANGE UP (ref 0–0.7)
EOSINOPHIL NFR BLD AUTO: 0 % — SIGNIFICANT CHANGE UP (ref 0–8)
EOSINOPHIL NFR BLD AUTO: 15.8 % — HIGH (ref 0–8)
EOSINOPHIL NFR BLD AUTO: 2.1 % — SIGNIFICANT CHANGE UP (ref 0–8)
EOSINOPHIL NFR BLD AUTO: 2.2 % — SIGNIFICANT CHANGE UP (ref 0–8)
EOSINOPHIL NFR BLD AUTO: 2.7 % — SIGNIFICANT CHANGE UP (ref 0–8)
EOSINOPHIL NFR BLD AUTO: 4.4 % — SIGNIFICANT CHANGE UP (ref 0–8)
EOSINOPHIL NFR BLD AUTO: 5.3 % — SIGNIFICANT CHANGE UP (ref 0–8)
EOSINOPHIL NFR BLD AUTO: 6.1 % — SIGNIFICANT CHANGE UP (ref 0–8)
EOSINOPHIL NFR BLD AUTO: 6.5 % — SIGNIFICANT CHANGE UP (ref 0–8)
EOSINOPHIL NFR BLD AUTO: 7 % — SIGNIFICANT CHANGE UP (ref 0–8)
EOSINOPHIL NFR BLD AUTO: 7.1 % — SIGNIFICANT CHANGE UP (ref 0–8)
EOSINOPHIL NFR BLD AUTO: 7.1 % — SIGNIFICANT CHANGE UP (ref 0–8)
EOSINOPHIL NFR BLD AUTO: 7.5 % — SIGNIFICANT CHANGE UP (ref 0–8)
EOSINOPHIL NFR BLD AUTO: 7.8 % — SIGNIFICANT CHANGE UP (ref 0–8)
EOSINOPHIL NFR BLD AUTO: 8.3 % — HIGH (ref 0–8)
EOSINOPHIL NFR BLD AUTO: 9.2 % — HIGH (ref 0–8)
FERRITIN SERPL-MCNC: 615 NG/ML — HIGH (ref 30–400)
FIBRINOGEN PPP-MCNC: 692 MG/DL — HIGH (ref 204.4–570.6)
FOLATE SERPL-MCNC: 7.8 NG/ML — SIGNIFICANT CHANGE UP
GAS PNL BLDA: SIGNIFICANT CHANGE UP
GAS PNL BLDV: 137 MMOL/L — SIGNIFICANT CHANGE UP (ref 136–145)
GAS PNL BLDV: 137 MMOL/L — SIGNIFICANT CHANGE UP (ref 136–145)
GAS PNL BLDV: 139 MMOL/L — SIGNIFICANT CHANGE UP (ref 136–145)
GAS PNL BLDV: SIGNIFICANT CHANGE UP
GIANT PLATELETS BLD QL SMEAR: PRESENT — SIGNIFICANT CHANGE UP
GLUCOSE BLDC GLUCOMTR-MCNC: 106 MG/DL — HIGH (ref 70–99)
GLUCOSE BLDC GLUCOMTR-MCNC: 108 MG/DL — HIGH (ref 70–99)
GLUCOSE BLDC GLUCOMTR-MCNC: 112 MG/DL — HIGH (ref 70–99)
GLUCOSE BLDC GLUCOMTR-MCNC: 114 MG/DL — HIGH (ref 70–99)
GLUCOSE BLDC GLUCOMTR-MCNC: 115 MG/DL — HIGH (ref 70–99)
GLUCOSE BLDC GLUCOMTR-MCNC: 123 MG/DL — HIGH (ref 70–99)
GLUCOSE BLDC GLUCOMTR-MCNC: 125 MG/DL — HIGH (ref 70–99)
GLUCOSE BLDC GLUCOMTR-MCNC: 125 MG/DL — HIGH (ref 70–99)
GLUCOSE BLDC GLUCOMTR-MCNC: 126 MG/DL — HIGH (ref 70–99)
GLUCOSE BLDC GLUCOMTR-MCNC: 132 MG/DL — HIGH (ref 70–99)
GLUCOSE BLDC GLUCOMTR-MCNC: 17 MG/DL — CRITICAL LOW (ref 70–99)
GLUCOSE BLDC GLUCOMTR-MCNC: 171 MG/DL — HIGH (ref 70–99)
GLUCOSE BLDC GLUCOMTR-MCNC: 172 MG/DL — HIGH (ref 70–99)
GLUCOSE BLDC GLUCOMTR-MCNC: 33 MG/DL — CRITICAL LOW (ref 70–99)
GLUCOSE BLDC GLUCOMTR-MCNC: 43 MG/DL — CRITICAL LOW (ref 70–99)
GLUCOSE BLDC GLUCOMTR-MCNC: 47 MG/DL — CRITICAL LOW (ref 70–99)
GLUCOSE BLDC GLUCOMTR-MCNC: 70 MG/DL — SIGNIFICANT CHANGE UP (ref 70–99)
GLUCOSE BLDC GLUCOMTR-MCNC: 76 MG/DL — SIGNIFICANT CHANGE UP (ref 70–99)
GLUCOSE BLDC GLUCOMTR-MCNC: 94 MG/DL — SIGNIFICANT CHANGE UP (ref 70–99)
GLUCOSE BLDC GLUCOMTR-MCNC: 99 MG/DL — SIGNIFICANT CHANGE UP (ref 70–99)
GLUCOSE SERPL-MCNC: 108 MG/DL — HIGH (ref 70–99)
GLUCOSE SERPL-MCNC: 108 MG/DL — HIGH (ref 70–99)
GLUCOSE SERPL-MCNC: 112 MG/DL — HIGH (ref 70–99)
GLUCOSE SERPL-MCNC: 114 MG/DL — HIGH (ref 70–99)
GLUCOSE SERPL-MCNC: 119 MG/DL — HIGH (ref 70–99)
GLUCOSE SERPL-MCNC: 125 MG/DL — HIGH (ref 70–99)
GLUCOSE SERPL-MCNC: 128 MG/DL — HIGH (ref 70–99)
GLUCOSE SERPL-MCNC: 130 MG/DL — HIGH (ref 70–99)
GLUCOSE SERPL-MCNC: 141 MG/DL — HIGH (ref 70–99)
GLUCOSE SERPL-MCNC: 162 MG/DL — HIGH (ref 70–99)
GLUCOSE SERPL-MCNC: 52 MG/DL — CRITICAL LOW (ref 70–99)
GLUCOSE SERPL-MCNC: 68 MG/DL — LOW (ref 70–99)
GLUCOSE SERPL-MCNC: 76 MG/DL — SIGNIFICANT CHANGE UP (ref 70–99)
GLUCOSE SERPL-MCNC: 80 MG/DL — SIGNIFICANT CHANGE UP (ref 70–99)
GLUCOSE SERPL-MCNC: 83 MG/DL — SIGNIFICANT CHANGE UP (ref 70–99)
GLUCOSE SERPL-MCNC: 88 MG/DL — SIGNIFICANT CHANGE UP (ref 70–99)
GLUCOSE SERPL-MCNC: 90 MG/DL — SIGNIFICANT CHANGE UP (ref 70–99)
GLUCOSE SERPL-MCNC: 98 MG/DL — SIGNIFICANT CHANGE UP (ref 70–99)
GLUCOSE UR QL: NEGATIVE — SIGNIFICANT CHANGE UP
HCO3 BLDV-SCNC: 31 MMOL/L — HIGH (ref 22–29)
HCO3 BLDV-SCNC: 31 MMOL/L — HIGH (ref 22–29)
HCO3 BLDV-SCNC: 40 MMOL/L — HIGH (ref 22–29)
HCT VFR BLD CALC: 19.2 % — LOW (ref 42–52)
HCT VFR BLD CALC: 19.8 % — LOW (ref 42–52)
HCT VFR BLD CALC: 20.1 % — LOW (ref 42–52)
HCT VFR BLD CALC: 20.1 % — LOW (ref 42–52)
HCT VFR BLD CALC: 20.4 % — LOW (ref 42–52)
HCT VFR BLD CALC: 20.8 % — LOW (ref 42–52)
HCT VFR BLD CALC: 21 % — LOW (ref 42–52)
HCT VFR BLD CALC: 21.2 % — LOW (ref 42–52)
HCT VFR BLD CALC: 21.3 % — LOW (ref 42–52)
HCT VFR BLD CALC: 21.3 % — LOW (ref 42–52)
HCT VFR BLD CALC: 21.4 % — LOW (ref 42–52)
HCT VFR BLD CALC: 21.6 % — LOW (ref 42–52)
HCT VFR BLD CALC: 23.1 % — LOW (ref 42–52)
HCT VFR BLD CALC: 23.1 % — LOW (ref 42–52)
HCT VFR BLD CALC: 24.4 % — LOW (ref 42–52)
HCT VFR BLD CALC: 24.7 % — LOW (ref 42–52)
HCT VFR BLD CALC: 34.6 % — LOW (ref 42–52)
HCT VFR BLD CALC: 34.8 % — LOW (ref 42–52)
HCT VFR BLD CALC: 35.1 % — LOW (ref 42–52)
HCT VFR BLD CALC: 35.5 % — LOW (ref 42–52)
HCT VFR BLDA CALC: 19.8 % — LOW (ref 34–44)
HCT VFR BLDA CALC: 30 % — LOW (ref 39–51)
HCT VFR BLDA CALC: 32 % — LOW (ref 39–51)
HGB BLD CALC-MCNC: 10.5 G/DL — LOW (ref 12.6–17.4)
HGB BLD CALC-MCNC: 6.5 G/DL — LOW (ref 14–18)
HGB BLD CALC-MCNC: 9.9 G/DL — LOW (ref 12.6–17.4)
HGB BLD-MCNC: 10 G/DL — LOW (ref 14–18)
HGB BLD-MCNC: 10.3 G/DL — LOW (ref 14–18)
HGB BLD-MCNC: 10.5 G/DL — LOW (ref 14–18)
HGB BLD-MCNC: 10.5 G/DL — LOW (ref 14–18)
HGB BLD-MCNC: 5.9 G/DL — CRITICAL LOW (ref 14–18)
HGB BLD-MCNC: 6 G/DL — CRITICAL LOW (ref 14–18)
HGB BLD-MCNC: 6.1 G/DL — CRITICAL LOW (ref 14–18)
HGB BLD-MCNC: 6.2 G/DL — CRITICAL LOW (ref 14–18)
HGB BLD-MCNC: 6.2 G/DL — CRITICAL LOW (ref 14–18)
HGB BLD-MCNC: 6.5 G/DL — CRITICAL LOW (ref 14–18)
HGB BLD-MCNC: 6.5 G/DL — CRITICAL LOW (ref 14–18)
HGB BLD-MCNC: 6.6 G/DL — CRITICAL LOW (ref 14–18)
HGB BLD-MCNC: 6.7 G/DL — CRITICAL LOW (ref 14–18)
HGB BLD-MCNC: 6.9 G/DL — CRITICAL LOW (ref 14–18)
HGB BLD-MCNC: 7.1 G/DL — LOW (ref 14–18)
HGB BLD-MCNC: 7.5 G/DL — LOW (ref 14–18)
HGB BLD-MCNC: 7.7 G/DL — LOW (ref 14–18)
HGB BLD-MCNC: 7.7 G/DL — LOW (ref 14–18)
HYPOCHROMIA BLD QL: SLIGHT — SIGNIFICANT CHANGE UP
IMM GRANULOCYTES NFR BLD AUTO: 0.2 % — SIGNIFICANT CHANGE UP (ref 0.1–0.3)
IMM GRANULOCYTES NFR BLD AUTO: 0.3 % — SIGNIFICANT CHANGE UP (ref 0.1–0.3)
IMM GRANULOCYTES NFR BLD AUTO: 0.5 % — HIGH (ref 0.1–0.3)
IMM GRANULOCYTES NFR BLD AUTO: 0.6 % — HIGH (ref 0.1–0.3)
IMM GRANULOCYTES NFR BLD AUTO: 0.7 % — HIGH (ref 0.1–0.3)
IMM GRANULOCYTES NFR BLD AUTO: 2.3 % — HIGH (ref 0.1–0.3)
INR BLD: 1.17 RATIO — SIGNIFICANT CHANGE UP (ref 0.65–1.3)
INR BLD: 1.18 RATIO — SIGNIFICANT CHANGE UP (ref 0.65–1.3)
INR BLD: 1.21 RATIO — SIGNIFICANT CHANGE UP (ref 0.65–1.3)
INR BLD: 1.21 RATIO — SIGNIFICANT CHANGE UP (ref 0.65–1.3)
INR BLD: 1.25 RATIO — SIGNIFICANT CHANGE UP (ref 0.65–1.3)
IRON SATN MFR SERPL: 21 % — SIGNIFICANT CHANGE UP (ref 15–50)
IRON SATN MFR SERPL: 58 UG/DL — SIGNIFICANT CHANGE UP (ref 35–150)
KETONES UR-MCNC: NEGATIVE — SIGNIFICANT CHANGE UP
LACTATE BLDV-MCNC: 1.5 MMOL/L — SIGNIFICANT CHANGE UP (ref 0.5–1.6)
LACTATE BLDV-MCNC: 3.7 MMOL/L — HIGH (ref 0.5–2)
LACTATE BLDV-MCNC: 4.6 MMOL/L — CRITICAL HIGH (ref 0.5–2)
LDH SERPL L TO P-CCNC: 810 U/L — HIGH (ref 50–242)
LEUKOCYTE ESTERASE UR-ACNC: ABNORMAL
LIDOCAIN IGE QN: 74 U/L — HIGH (ref 7–60)
LYMPHOCYTES # BLD AUTO: 0.12 K/UL — LOW (ref 1.2–3.4)
LYMPHOCYTES # BLD AUTO: 0.18 K/UL — LOW (ref 1.2–3.4)
LYMPHOCYTES # BLD AUTO: 0.21 K/UL — LOW (ref 1.2–3.4)
LYMPHOCYTES # BLD AUTO: 0.29 K/UL — LOW (ref 1.2–3.4)
LYMPHOCYTES # BLD AUTO: 0.29 K/UL — LOW (ref 1.2–3.4)
LYMPHOCYTES # BLD AUTO: 0.3 K/UL — LOW (ref 1.2–3.4)
LYMPHOCYTES # BLD AUTO: 0.33 K/UL — LOW (ref 1.2–3.4)
LYMPHOCYTES # BLD AUTO: 0.38 K/UL — LOW (ref 1.2–3.4)
LYMPHOCYTES # BLD AUTO: 0.42 K/UL — LOW (ref 1.2–3.4)
LYMPHOCYTES # BLD AUTO: 0.45 K/UL — LOW (ref 1.2–3.4)
LYMPHOCYTES # BLD AUTO: 0.51 K/UL — LOW (ref 1.2–3.4)
LYMPHOCYTES # BLD AUTO: 0.55 K/UL — LOW (ref 1.2–3.4)
LYMPHOCYTES # BLD AUTO: 0.64 K/UL — LOW (ref 1.2–3.4)
LYMPHOCYTES # BLD AUTO: 0.67 K/UL — LOW (ref 1.2–3.4)
LYMPHOCYTES # BLD AUTO: 10.4 % — LOW (ref 20.5–51.1)
LYMPHOCYTES # BLD AUTO: 10.6 % — LOW (ref 20.5–51.1)
LYMPHOCYTES # BLD AUTO: 13.2 % — LOW (ref 20.5–51.1)
LYMPHOCYTES # BLD AUTO: 2.6 % — LOW (ref 20.5–51.1)
LYMPHOCYTES # BLD AUTO: 2.6 % — LOW (ref 20.5–51.1)
LYMPHOCYTES # BLD AUTO: 4.4 % — LOW (ref 20.5–51.1)
LYMPHOCYTES # BLD AUTO: 4.8 % — LOW (ref 20.5–51.1)
LYMPHOCYTES # BLD AUTO: 6.1 % — LOW (ref 20.5–51.1)
LYMPHOCYTES # BLD AUTO: 6.4 % — LOW (ref 20.5–51.1)
LYMPHOCYTES # BLD AUTO: 7 % — LOW (ref 20.5–51.1)
LYMPHOCYTES # BLD AUTO: 7 % — LOW (ref 20.5–51.1)
LYMPHOCYTES # BLD AUTO: 7.6 % — LOW (ref 20.5–51.1)
LYMPHOCYTES # BLD AUTO: 8.4 % — LOW (ref 20.5–51.1)
LYMPHOCYTES # BLD AUTO: 8.5 % — LOW (ref 20.5–51.1)
LYMPHOCYTES # BLD AUTO: 9.6 % — LOW (ref 20.5–51.1)
LYMPHOCYTES # BLD AUTO: 9.8 % — LOW (ref 20.5–51.1)
MACROCYTES BLD QL: SIGNIFICANT CHANGE UP
MACROCYTES BLD QL: SLIGHT — SIGNIFICANT CHANGE UP
MAGNESIUM SERPL-MCNC: 1.8 MG/DL — SIGNIFICANT CHANGE UP (ref 1.8–2.4)
MAGNESIUM SERPL-MCNC: 1.9 MG/DL — SIGNIFICANT CHANGE UP (ref 1.8–2.4)
MAGNESIUM SERPL-MCNC: 2 MG/DL — SIGNIFICANT CHANGE UP (ref 1.8–2.4)
MAGNESIUM SERPL-MCNC: 2.3 MG/DL — SIGNIFICANT CHANGE UP (ref 1.8–2.4)
MANUAL SMEAR VERIFICATION: SIGNIFICANT CHANGE UP
MCHC RBC-ENTMCNC: 28.7 G/DL — LOW (ref 32–37)
MCHC RBC-ENTMCNC: 29 PG — SIGNIFICANT CHANGE UP (ref 27–31)
MCHC RBC-ENTMCNC: 29.4 PG — SIGNIFICANT CHANGE UP (ref 27–31)
MCHC RBC-ENTMCNC: 29.4 PG — SIGNIFICANT CHANGE UP (ref 27–31)
MCHC RBC-ENTMCNC: 29.6 G/DL — LOW (ref 32–37)
MCHC RBC-ENTMCNC: 29.8 G/DL — LOW (ref 32–37)
MCHC RBC-ENTMCNC: 29.9 G/DL — LOW (ref 32–37)
MCHC RBC-ENTMCNC: 29.9 PG — SIGNIFICANT CHANGE UP (ref 27–31)
MCHC RBC-ENTMCNC: 29.9 PG — SIGNIFICANT CHANGE UP (ref 27–31)
MCHC RBC-ENTMCNC: 30.1 PG — SIGNIFICANT CHANGE UP (ref 27–31)
MCHC RBC-ENTMCNC: 30.2 PG — SIGNIFICANT CHANGE UP (ref 27–31)
MCHC RBC-ENTMCNC: 30.3 G/DL — LOW (ref 32–37)
MCHC RBC-ENTMCNC: 30.3 G/DL — LOW (ref 32–37)
MCHC RBC-ENTMCNC: 30.3 PG — SIGNIFICANT CHANGE UP (ref 27–31)
MCHC RBC-ENTMCNC: 30.4 G/DL — LOW (ref 32–37)
MCHC RBC-ENTMCNC: 30.4 G/DL — LOW (ref 32–37)
MCHC RBC-ENTMCNC: 30.4 PG — SIGNIFICANT CHANGE UP (ref 27–31)
MCHC RBC-ENTMCNC: 30.5 PG — SIGNIFICANT CHANGE UP (ref 27–31)
MCHC RBC-ENTMCNC: 30.6 G/DL — LOW (ref 32–37)
MCHC RBC-ENTMCNC: 30.6 PG — SIGNIFICANT CHANGE UP (ref 27–31)
MCHC RBC-ENTMCNC: 30.7 G/DL — LOW (ref 32–37)
MCHC RBC-ENTMCNC: 30.7 G/DL — LOW (ref 32–37)
MCHC RBC-ENTMCNC: 30.7 PG — SIGNIFICANT CHANGE UP (ref 27–31)
MCHC RBC-ENTMCNC: 30.7 PG — SIGNIFICANT CHANGE UP (ref 27–31)
MCHC RBC-ENTMCNC: 30.8 G/DL — LOW (ref 32–37)
MCHC RBC-ENTMCNC: 30.9 PG — SIGNIFICANT CHANGE UP (ref 27–31)
MCHC RBC-ENTMCNC: 31 G/DL — LOW (ref 32–37)
MCHC RBC-ENTMCNC: 31.1 PG — HIGH (ref 27–31)
MCHC RBC-ENTMCNC: 31.2 G/DL — LOW (ref 32–37)
MCHC RBC-ENTMCNC: 31.2 PG — HIGH (ref 27–31)
MCHC RBC-ENTMCNC: 31.5 G/DL — LOW (ref 32–37)
MCHC RBC-ENTMCNC: 31.5 G/DL — LOW (ref 32–37)
MCHC RBC-ENTMCNC: 31.6 G/DL — LOW (ref 32–37)
MCHC RBC-ENTMCNC: 32.2 G/DL — SIGNIFICANT CHANGE UP (ref 32–37)
MCHC RBC-ENTMCNC: 32.5 G/DL — SIGNIFICANT CHANGE UP (ref 32–37)
MCHC RBC-ENTMCNC: 32.5 G/DL — SIGNIFICANT CHANGE UP (ref 32–37)
MCV RBC AUTO: 100 FL — HIGH (ref 80–94)
MCV RBC AUTO: 100 FL — HIGH (ref 80–94)
MCV RBC AUTO: 101.4 FL — HIGH (ref 80–94)
MCV RBC AUTO: 101.5 FL — HIGH (ref 80–94)
MCV RBC AUTO: 101.9 FL — HIGH (ref 80–94)
MCV RBC AUTO: 102.9 FL — HIGH (ref 80–94)
MCV RBC AUTO: 104.8 FL — HIGH (ref 80–94)
MCV RBC AUTO: 93.4 FL — SIGNIFICANT CHANGE UP (ref 80–94)
MCV RBC AUTO: 94.1 FL — HIGH (ref 80–94)
MCV RBC AUTO: 94.7 FL — HIGH (ref 80–94)
MCV RBC AUTO: 95.3 FL — HIGH (ref 80–94)
MCV RBC AUTO: 95.5 FL — HIGH (ref 80–94)
MCV RBC AUTO: 95.5 FL — HIGH (ref 80–94)
MCV RBC AUTO: 96.8 FL — HIGH (ref 80–94)
MCV RBC AUTO: 97.2 FL — HIGH (ref 80–94)
MCV RBC AUTO: 98.2 FL — HIGH (ref 80–94)
MCV RBC AUTO: 98.5 FL — HIGH (ref 80–94)
MCV RBC AUTO: 98.5 FL — HIGH (ref 80–94)
MCV RBC AUTO: 98.6 FL — HIGH (ref 80–94)
MCV RBC AUTO: 99.1 FL — HIGH (ref 80–94)
METAMYELOCYTES # FLD: 0.9 % — HIGH (ref 0–0)
METAMYELOCYTES # FLD: 11.6 % — HIGH (ref 0–0)
MICROCYTES BLD QL: SLIGHT — SIGNIFICANT CHANGE UP
MICROCYTES BLD QL: SLIGHT — SIGNIFICANT CHANGE UP
MONOCYTES # BLD AUTO: 0.06 K/UL — LOW (ref 0.1–0.6)
MONOCYTES # BLD AUTO: 0.09 K/UL — LOW (ref 0.1–0.6)
MONOCYTES # BLD AUTO: 0.16 K/UL — SIGNIFICANT CHANGE UP (ref 0.1–0.6)
MONOCYTES # BLD AUTO: 0.23 K/UL — SIGNIFICANT CHANGE UP (ref 0.1–0.6)
MONOCYTES # BLD AUTO: 0.3 K/UL — SIGNIFICANT CHANGE UP (ref 0.1–0.6)
MONOCYTES # BLD AUTO: 0.35 K/UL — SIGNIFICANT CHANGE UP (ref 0.1–0.6)
MONOCYTES # BLD AUTO: 0.37 K/UL — SIGNIFICANT CHANGE UP (ref 0.1–0.6)
MONOCYTES # BLD AUTO: 0.39 K/UL — SIGNIFICANT CHANGE UP (ref 0.1–0.6)
MONOCYTES # BLD AUTO: 0.51 K/UL — SIGNIFICANT CHANGE UP (ref 0.1–0.6)
MONOCYTES # BLD AUTO: 0.53 K/UL — SIGNIFICANT CHANGE UP (ref 0.1–0.6)
MONOCYTES # BLD AUTO: 0.55 K/UL — SIGNIFICANT CHANGE UP (ref 0.1–0.6)
MONOCYTES # BLD AUTO: 0.6 K/UL — SIGNIFICANT CHANGE UP (ref 0.1–0.6)
MONOCYTES # BLD AUTO: 0.62 K/UL — HIGH (ref 0.1–0.6)
MONOCYTES # BLD AUTO: 0.64 K/UL — HIGH (ref 0.1–0.6)
MONOCYTES NFR BLD AUTO: 0.9 % — LOW (ref 1.7–9.3)
MONOCYTES NFR BLD AUTO: 10 % — HIGH (ref 1.7–9.3)
MONOCYTES NFR BLD AUTO: 10.9 % — HIGH (ref 1.7–9.3)
MONOCYTES NFR BLD AUTO: 11.7 % — HIGH (ref 1.7–9.3)
MONOCYTES NFR BLD AUTO: 2.4 % — SIGNIFICANT CHANGE UP (ref 1.7–9.3)
MONOCYTES NFR BLD AUTO: 4.5 % — SIGNIFICANT CHANGE UP (ref 1.7–9.3)
MONOCYTES NFR BLD AUTO: 5.3 % — SIGNIFICANT CHANGE UP (ref 1.7–9.3)
MONOCYTES NFR BLD AUTO: 6.8 % — SIGNIFICANT CHANGE UP (ref 1.7–9.3)
MONOCYTES NFR BLD AUTO: 7 % — SIGNIFICANT CHANGE UP (ref 1.7–9.3)
MONOCYTES NFR BLD AUTO: 7 % — SIGNIFICANT CHANGE UP (ref 1.7–9.3)
MONOCYTES NFR BLD AUTO: 7.8 % — SIGNIFICANT CHANGE UP (ref 1.7–9.3)
MONOCYTES NFR BLD AUTO: 7.9 % — SIGNIFICANT CHANGE UP (ref 1.7–9.3)
MONOCYTES NFR BLD AUTO: 7.9 % — SIGNIFICANT CHANGE UP (ref 1.7–9.3)
MONOCYTES NFR BLD AUTO: 8.1 % — SIGNIFICANT CHANGE UP (ref 1.7–9.3)
MONOCYTES NFR BLD AUTO: 8.7 % — SIGNIFICANT CHANGE UP (ref 1.7–9.3)
MONOCYTES NFR BLD AUTO: 9.3 % — SIGNIFICANT CHANGE UP (ref 1.7–9.3)
MYELOCYTES NFR BLD: 0.9 % — HIGH (ref 0–0)
MYELOCYTES NFR BLD: 0.9 % — HIGH (ref 0–0)
MYELOCYTES NFR BLD: 5.4 % — HIGH (ref 0–0)
NEUTROPHILS # BLD AUTO: 1.75 K/UL — SIGNIFICANT CHANGE UP (ref 1.4–6.5)
NEUTROPHILS # BLD AUTO: 2.26 K/UL — SIGNIFICANT CHANGE UP (ref 1.4–6.5)
NEUTROPHILS # BLD AUTO: 3.21 K/UL — SIGNIFICANT CHANGE UP (ref 1.4–6.5)
NEUTROPHILS # BLD AUTO: 3.26 K/UL — SIGNIFICANT CHANGE UP (ref 1.4–6.5)
NEUTROPHILS # BLD AUTO: 3.26 K/UL — SIGNIFICANT CHANGE UP (ref 1.4–6.5)
NEUTROPHILS # BLD AUTO: 3.32 K/UL — SIGNIFICANT CHANGE UP (ref 1.4–6.5)
NEUTROPHILS # BLD AUTO: 3.34 K/UL — SIGNIFICANT CHANGE UP (ref 1.4–6.5)
NEUTROPHILS # BLD AUTO: 3.46 K/UL — SIGNIFICANT CHANGE UP (ref 1.4–6.5)
NEUTROPHILS # BLD AUTO: 3.75 K/UL — SIGNIFICANT CHANGE UP (ref 1.4–6.5)
NEUTROPHILS # BLD AUTO: 4.26 K/UL — SIGNIFICANT CHANGE UP (ref 1.4–6.5)
NEUTROPHILS # BLD AUTO: 4.3 K/UL — SIGNIFICANT CHANGE UP (ref 1.4–6.5)
NEUTROPHILS # BLD AUTO: 4.39 K/UL — SIGNIFICANT CHANGE UP (ref 1.4–6.5)
NEUTROPHILS # BLD AUTO: 4.48 K/UL — SIGNIFICANT CHANGE UP (ref 1.4–6.5)
NEUTROPHILS # BLD AUTO: 4.75 K/UL — SIGNIFICANT CHANGE UP (ref 1.4–6.5)
NEUTROPHILS # BLD AUTO: 6.02 K/UL — SIGNIFICANT CHANGE UP (ref 1.4–6.5)
NEUTROPHILS # BLD AUTO: 7.3 K/UL — HIGH (ref 1.4–6.5)
NEUTROPHILS NFR BLD AUTO: 52.7 % — SIGNIFICANT CHANGE UP (ref 42.2–75.2)
NEUTROPHILS NFR BLD AUTO: 58.8 % — SIGNIFICANT CHANGE UP (ref 42.2–75.2)
NEUTROPHILS NFR BLD AUTO: 71 % — SIGNIFICANT CHANGE UP (ref 42.2–75.2)
NEUTROPHILS NFR BLD AUTO: 71.1 % — SIGNIFICANT CHANGE UP (ref 42.2–75.2)
NEUTROPHILS NFR BLD AUTO: 71.9 % — SIGNIFICANT CHANGE UP (ref 42.2–75.2)
NEUTROPHILS NFR BLD AUTO: 72.3 % — SIGNIFICANT CHANGE UP (ref 42.2–75.2)
NEUTROPHILS NFR BLD AUTO: 72.8 % — SIGNIFICANT CHANGE UP (ref 42.2–75.2)
NEUTROPHILS NFR BLD AUTO: 73.6 % — SIGNIFICANT CHANGE UP (ref 42.2–75.2)
NEUTROPHILS NFR BLD AUTO: 75.4 % — HIGH (ref 42.2–75.2)
NEUTROPHILS NFR BLD AUTO: 78.1 % — HIGH (ref 42.2–75.2)
NEUTROPHILS NFR BLD AUTO: 78.2 % — HIGH (ref 42.2–75.2)
NEUTROPHILS NFR BLD AUTO: 80 % — HIGH (ref 42.2–75.2)
NEUTROPHILS NFR BLD AUTO: 83.7 % — HIGH (ref 42.2–75.2)
NEUTROPHILS NFR BLD AUTO: 86.6 % — HIGH (ref 42.2–75.2)
NEUTROPHILS NFR BLD AUTO: 89.8 % — HIGH (ref 42.2–75.2)
NEUTROPHILS NFR BLD AUTO: 89.8 % — HIGH (ref 42.2–75.2)
NEUTS BAND # BLD: 3.6 % — SIGNIFICANT CHANGE UP (ref 0–6)
NEUTS BAND # BLD: 4.5 % — SIGNIFICANT CHANGE UP (ref 0–6)
NITRITE UR-MCNC: NEGATIVE — SIGNIFICANT CHANGE UP
NRBC # BLD: 0 /100 WBCS — SIGNIFICANT CHANGE UP (ref 0–0)
NRBC # BLD: 17 /100 WBCS — HIGH (ref 0–0)
NRBC # BLD: 25 /100 WBCS — HIGH (ref 0–0)
NRBC # BLD: 48 /100 — HIGH (ref 0–0)
NRBC # BLD: SIGNIFICANT CHANGE UP /100 WBCS (ref 0–0)
NT-PROBNP SERPL-SCNC: HIGH PG/ML (ref 0–300)
OB PNL STL: POSITIVE
OVALOCYTES BLD QL SMEAR: SLIGHT — SIGNIFICANT CHANGE UP
PCO2 BLDV: 59 MMHG — HIGH (ref 41–51)
PCO2 BLDV: 81 MMHG — HIGH (ref 42–55)
PCO2 BLDV: 94 MMHG — HIGH (ref 42–55)
PH BLDV: 7.13 — LOW (ref 7.32–7.43)
PH BLDV: 7.19 — LOW (ref 7.32–7.43)
PH BLDV: 7.44 — HIGH (ref 7.26–7.43)
PH UR: 6 — SIGNIFICANT CHANGE UP (ref 5–8)
PHOSPHATE SERPL-MCNC: 3.6 MG/DL — SIGNIFICANT CHANGE UP (ref 2.1–4.9)
PLAT MORPH BLD: NORMAL — SIGNIFICANT CHANGE UP
PLATELET # BLD AUTO: 106 K/UL — LOW (ref 130–400)
PLATELET # BLD AUTO: 109 K/UL — LOW (ref 130–400)
PLATELET # BLD AUTO: 115 K/UL — LOW (ref 130–400)
PLATELET # BLD AUTO: 119 K/UL — LOW (ref 130–400)
PLATELET # BLD AUTO: 121 K/UL — LOW (ref 130–400)
PLATELET # BLD AUTO: 124 K/UL — LOW (ref 130–400)
PLATELET # BLD AUTO: 126 K/UL — LOW (ref 130–400)
PLATELET # BLD AUTO: 127 K/UL — LOW (ref 130–400)
PLATELET # BLD AUTO: 133 K/UL — SIGNIFICANT CHANGE UP (ref 130–400)
PLATELET # BLD AUTO: 146 K/UL — SIGNIFICANT CHANGE UP (ref 130–400)
PLATELET # BLD AUTO: 150 K/UL — SIGNIFICANT CHANGE UP (ref 130–400)
PLATELET # BLD AUTO: 154 K/UL — SIGNIFICANT CHANGE UP (ref 130–400)
PLATELET # BLD AUTO: 157 K/UL — SIGNIFICANT CHANGE UP (ref 130–400)
PLATELET # BLD AUTO: 157 K/UL — SIGNIFICANT CHANGE UP (ref 130–400)
PLATELET # BLD AUTO: 159 K/UL — SIGNIFICANT CHANGE UP (ref 130–400)
PLATELET # BLD AUTO: 163 K/UL — SIGNIFICANT CHANGE UP (ref 130–400)
PLATELET # BLD AUTO: 39 K/UL — LOW (ref 130–400)
PLATELET # BLD AUTO: 51 K/UL — LOW (ref 130–400)
PLATELET # BLD AUTO: 52 K/UL — LOW (ref 130–400)
PLATELET # BLD AUTO: 80 K/UL — LOW (ref 130–400)
PO2 BLDV: 16 MMHG — LOW (ref 20–40)
PO2 BLDV: 40 MMHG — SIGNIFICANT CHANGE UP
PO2 BLDV: 52 MMHG — SIGNIFICANT CHANGE UP
POIKILOCYTOSIS BLD QL AUTO: SIGNIFICANT CHANGE UP
POIKILOCYTOSIS BLD QL AUTO: SLIGHT — SIGNIFICANT CHANGE UP
POLYCHROMASIA BLD QL SMEAR: SIGNIFICANT CHANGE UP
POLYCHROMASIA BLD QL SMEAR: SLIGHT — SIGNIFICANT CHANGE UP
POTASSIUM BLDV-SCNC: 3.9 MMOL/L — SIGNIFICANT CHANGE UP (ref 3.3–5.6)
POTASSIUM BLDV-SCNC: 3.9 MMOL/L — SIGNIFICANT CHANGE UP (ref 3.5–5.1)
POTASSIUM BLDV-SCNC: 4.1 MMOL/L — SIGNIFICANT CHANGE UP (ref 3.5–5.1)
POTASSIUM SERPL-MCNC: 3.5 MMOL/L — SIGNIFICANT CHANGE UP (ref 3.5–5)
POTASSIUM SERPL-MCNC: 3.6 MMOL/L — SIGNIFICANT CHANGE UP (ref 3.5–5)
POTASSIUM SERPL-MCNC: 3.7 MMOL/L — SIGNIFICANT CHANGE UP (ref 3.5–5)
POTASSIUM SERPL-MCNC: 3.8 MMOL/L — SIGNIFICANT CHANGE UP (ref 3.5–5)
POTASSIUM SERPL-MCNC: 3.8 MMOL/L — SIGNIFICANT CHANGE UP (ref 3.5–5)
POTASSIUM SERPL-MCNC: 3.9 MMOL/L — SIGNIFICANT CHANGE UP (ref 3.5–5)
POTASSIUM SERPL-MCNC: 4 MMOL/L — SIGNIFICANT CHANGE UP (ref 3.5–5)
POTASSIUM SERPL-MCNC: 4 MMOL/L — SIGNIFICANT CHANGE UP (ref 3.5–5)
POTASSIUM SERPL-MCNC: 4.1 MMOL/L — SIGNIFICANT CHANGE UP (ref 3.5–5)
POTASSIUM SERPL-MCNC: 4.2 MMOL/L — SIGNIFICANT CHANGE UP (ref 3.5–5)
POTASSIUM SERPL-MCNC: 4.3 MMOL/L — SIGNIFICANT CHANGE UP (ref 3.5–5)
POTASSIUM SERPL-MCNC: 4.4 MMOL/L — SIGNIFICANT CHANGE UP (ref 3.5–5)
POTASSIUM SERPL-MCNC: 4.5 MMOL/L — SIGNIFICANT CHANGE UP (ref 3.5–5)
POTASSIUM SERPL-MCNC: 4.6 MMOL/L — SIGNIFICANT CHANGE UP (ref 3.5–5)
POTASSIUM SERPL-MCNC: 4.7 MMOL/L — SIGNIFICANT CHANGE UP (ref 3.5–5)
POTASSIUM SERPL-MCNC: 5 MMOL/L — SIGNIFICANT CHANGE UP (ref 3.5–5)
POTASSIUM SERPL-SCNC: 3.5 MMOL/L — SIGNIFICANT CHANGE UP (ref 3.5–5)
POTASSIUM SERPL-SCNC: 3.6 MMOL/L — SIGNIFICANT CHANGE UP (ref 3.5–5)
POTASSIUM SERPL-SCNC: 3.7 MMOL/L — SIGNIFICANT CHANGE UP (ref 3.5–5)
POTASSIUM SERPL-SCNC: 3.8 MMOL/L — SIGNIFICANT CHANGE UP (ref 3.5–5)
POTASSIUM SERPL-SCNC: 3.8 MMOL/L — SIGNIFICANT CHANGE UP (ref 3.5–5)
POTASSIUM SERPL-SCNC: 3.9 MMOL/L — SIGNIFICANT CHANGE UP (ref 3.5–5)
POTASSIUM SERPL-SCNC: 4 MMOL/L — SIGNIFICANT CHANGE UP (ref 3.5–5)
POTASSIUM SERPL-SCNC: 4 MMOL/L — SIGNIFICANT CHANGE UP (ref 3.5–5)
POTASSIUM SERPL-SCNC: 4.1 MMOL/L — SIGNIFICANT CHANGE UP (ref 3.5–5)
POTASSIUM SERPL-SCNC: 4.2 MMOL/L — SIGNIFICANT CHANGE UP (ref 3.5–5)
POTASSIUM SERPL-SCNC: 4.3 MMOL/L — SIGNIFICANT CHANGE UP (ref 3.5–5)
POTASSIUM SERPL-SCNC: 4.4 MMOL/L — SIGNIFICANT CHANGE UP (ref 3.5–5)
POTASSIUM SERPL-SCNC: 4.5 MMOL/L — SIGNIFICANT CHANGE UP (ref 3.5–5)
POTASSIUM SERPL-SCNC: 4.6 MMOL/L — SIGNIFICANT CHANGE UP (ref 3.5–5)
POTASSIUM SERPL-SCNC: 4.7 MMOL/L — SIGNIFICANT CHANGE UP (ref 3.5–5)
POTASSIUM SERPL-SCNC: 5 MMOL/L — SIGNIFICANT CHANGE UP (ref 3.5–5)
PROCALCITONIN SERPL-MCNC: 30.3 NG/ML — HIGH (ref 0.02–0.1)
PROMYELOCYTES # FLD: 0.4 % — HIGH (ref 0–0)
PROMYELOCYTES # FLD: 0.9 % — HIGH (ref 0–0)
PROT SERPL-MCNC: 4.7 G/DL — LOW (ref 6–8)
PROT SERPL-MCNC: 5.2 G/DL — LOW (ref 6–8)
PROT SERPL-MCNC: 6.5 G/DL — SIGNIFICANT CHANGE UP (ref 6–8)
PROT SERPL-MCNC: 6.8 G/DL — SIGNIFICANT CHANGE UP (ref 6–8)
PROT SERPL-MCNC: 6.8 G/DL — SIGNIFICANT CHANGE UP (ref 6–8)
PROT SERPL-MCNC: 6.9 G/DL — SIGNIFICANT CHANGE UP (ref 6–8)
PROT SERPL-MCNC: 6.9 G/DL — SIGNIFICANT CHANGE UP (ref 6–8)
PROT SERPL-MCNC: 7 G/DL — SIGNIFICANT CHANGE UP (ref 6–8)
PROT SERPL-MCNC: 7.1 G/DL — SIGNIFICANT CHANGE UP (ref 6–8)
PROT SERPL-MCNC: 7.2 G/DL — SIGNIFICANT CHANGE UP (ref 6–8)
PROT SERPL-MCNC: 7.3 G/DL — SIGNIFICANT CHANGE UP (ref 6–8)
PROT SERPL-MCNC: 7.4 G/DL — SIGNIFICANT CHANGE UP (ref 6–8)
PROT UR-MCNC: NEGATIVE — SIGNIFICANT CHANGE UP
PROTHROM AB SERPL-ACNC: 13.4 SEC — HIGH (ref 9.95–12.87)
PROTHROM AB SERPL-ACNC: 13.6 SEC — HIGH (ref 9.95–12.87)
PROTHROM AB SERPL-ACNC: 13.9 SEC — HIGH (ref 9.95–12.87)
PROTHROM AB SERPL-ACNC: 13.9 SEC — HIGH (ref 9.95–12.87)
PROTHROM AB SERPL-ACNC: 14.4 SEC — HIGH (ref 9.95–12.87)
RAPID RVP RESULT: SIGNIFICANT CHANGE UP
RBC # BLD: 2.01 M/UL — LOW (ref 4.7–6.1)
RBC # BLD: 2.01 M/UL — LOW (ref 4.7–6.1)
RBC # BLD: 2.04 M/UL — LOW (ref 4.7–6.1)
RBC # BLD: 2.04 M/UL — LOW (ref 4.7–6.1)
RBC # BLD: 2.11 M/UL — LOW (ref 4.7–6.1)
RBC # BLD: 2.12 M/UL — LOW (ref 4.7–6.1)
RBC # BLD: 2.13 M/UL — LOW (ref 4.7–6.1)
RBC # BLD: 2.15 M/UL — LOW (ref 4.7–6.1)
RBC # BLD: 2.17 M/UL — LOW (ref 4.7–6.1)
RBC # BLD: 2.21 M/UL — LOW (ref 4.7–6.1)
RBC # BLD: 2.24 M/UL — LOW (ref 4.7–6.1)
RBC # BLD: 2.27 M/UL — LOW (ref 4.7–6.1)
RBC # BLD: 2.31 M/UL — LOW (ref 4.7–6.1)
RBC # BLD: 2.44 M/UL — LOW (ref 4.7–6.1)
RBC # BLD: 2.52 M/UL — LOW (ref 4.7–6.1)
RBC # BLD: 2.54 M/UL — LOW (ref 4.7–6.1)
RBC # BLD: 3.32 M/UL — LOW (ref 4.7–6.1)
RBC # BLD: 3.32 M/UL — LOW (ref 4.7–6.1)
RBC # BLD: 3.41 M/UL — LOW (ref 4.7–6.1)
RBC # BLD: 3.45 M/UL — LOW (ref 4.7–6.1)
RBC # BLD: 3.46 M/UL — LOW (ref 4.7–6.1)
RBC # FLD: 15.8 % — HIGH (ref 11.5–14.5)
RBC # FLD: 16.2 % — HIGH (ref 11.5–14.5)
RBC # FLD: 16.4 % — HIGH (ref 11.5–14.5)
RBC # FLD: 16.6 % — HIGH (ref 11.5–14.5)
RBC # FLD: 16.7 % — HIGH (ref 11.5–14.5)
RBC # FLD: 16.8 % — HIGH (ref 11.5–14.5)
RBC # FLD: 17.2 % — HIGH (ref 11.5–14.5)
RBC # FLD: 17.3 % — HIGH (ref 11.5–14.5)
RBC # FLD: 17.4 % — HIGH (ref 11.5–14.5)
RBC # FLD: 17.4 % — HIGH (ref 11.5–14.5)
RBC # FLD: 17.5 % — HIGH (ref 11.5–14.5)
RBC # FLD: 17.7 % — HIGH (ref 11.5–14.5)
RBC # FLD: 17.8 % — HIGH (ref 11.5–14.5)
RBC # FLD: 17.9 % — HIGH (ref 11.5–14.5)
RBC # FLD: 17.9 % — HIGH (ref 11.5–14.5)
RBC # FLD: 18 % — HIGH (ref 11.5–14.5)
RBC # FLD: 18 % — HIGH (ref 11.5–14.5)
RBC # FLD: 18.1 % — HIGH (ref 11.5–14.5)
RBC # FLD: 18.1 % — HIGH (ref 11.5–14.5)
RBC # FLD: 18.4 % — HIGH (ref 11.5–14.5)
RBC BLD AUTO: ABNORMAL
RBC BLD AUTO: NORMAL — SIGNIFICANT CHANGE UP
RBC CASTS # UR COMP ASSIST: >720 /HPF — HIGH (ref 0–4)
RETICS #: 92.9 K/UL — SIGNIFICANT CHANGE UP (ref 25–125)
RETICS/RBC NFR: 2.8 % — HIGH (ref 0.5–1.5)
SAO2 % BLDV: 20 % — SIGNIFICANT CHANGE UP
SAO2 % BLDV: 53.4 % — SIGNIFICANT CHANGE UP
SAO2 % BLDV: 70.7 % — SIGNIFICANT CHANGE UP
SARS-COV-2 RNA SPEC QL NAA+PROBE: SIGNIFICANT CHANGE UP
SARS-COV-2 RNA SPEC QL NAA+PROBE: SIGNIFICANT CHANGE UP
SCHISTOCYTES BLD QL AUTO: SLIGHT — SIGNIFICANT CHANGE UP
SODIUM SERPL-SCNC: 129 MMOL/L — LOW (ref 135–146)
SODIUM SERPL-SCNC: 130 MMOL/L — LOW (ref 135–146)
SODIUM SERPL-SCNC: 132 MMOL/L — LOW (ref 135–146)
SODIUM SERPL-SCNC: 132 MMOL/L — LOW (ref 135–146)
SODIUM SERPL-SCNC: 134 MMOL/L — LOW (ref 135–146)
SODIUM SERPL-SCNC: 134 MMOL/L — LOW (ref 135–146)
SODIUM SERPL-SCNC: 135 MMOL/L — SIGNIFICANT CHANGE UP (ref 135–146)
SODIUM SERPL-SCNC: 136 MMOL/L — SIGNIFICANT CHANGE UP (ref 135–146)
SODIUM SERPL-SCNC: 137 MMOL/L — SIGNIFICANT CHANGE UP (ref 135–146)
SODIUM SERPL-SCNC: 137 MMOL/L — SIGNIFICANT CHANGE UP (ref 135–146)
SODIUM SERPL-SCNC: 138 MMOL/L — SIGNIFICANT CHANGE UP (ref 135–146)
SODIUM SERPL-SCNC: 138 MMOL/L — SIGNIFICANT CHANGE UP (ref 135–146)
SODIUM SERPL-SCNC: 141 MMOL/L — SIGNIFICANT CHANGE UP (ref 135–146)
SODIUM SERPL-SCNC: 142 MMOL/L — SIGNIFICANT CHANGE UP (ref 135–146)
SODIUM SERPL-SCNC: 143 MMOL/L — SIGNIFICANT CHANGE UP (ref 135–146)
SODIUM SERPL-SCNC: 144 MMOL/L — SIGNIFICANT CHANGE UP (ref 135–146)
SP GR SPEC: 1.02 — SIGNIFICANT CHANGE UP (ref 1.01–1.03)
SURGICAL PATHOLOGY STUDY: SIGNIFICANT CHANGE UP
TIBC SERPL-MCNC: 274 UG/DL — SIGNIFICANT CHANGE UP (ref 220–430)
TRANSFERRIN SERPL-MCNC: 224 MG/DL — SIGNIFICANT CHANGE UP (ref 200–360)
TROPONIN T SERPL-MCNC: 0.17 NG/ML — CRITICAL HIGH
TROPONIN T SERPL-MCNC: 0.21 NG/ML — CRITICAL HIGH
TROPONIN T SERPL-MCNC: 0.22 NG/ML — CRITICAL HIGH
TROPONIN T SERPL-MCNC: 0.24 NG/ML — CRITICAL HIGH
TROPONIN T SERPL-MCNC: 0.31 NG/ML — CRITICAL HIGH
TROPONIN T SERPL-MCNC: 0.32 NG/ML — CRITICAL HIGH
UIBC SERPL-MCNC: 216 UG/DL — SIGNIFICANT CHANGE UP (ref 110–370)
UROBILINOGEN FLD QL: SIGNIFICANT CHANGE UP
VARIANT LYMPHS # BLD: 0.5 % — SIGNIFICANT CHANGE UP (ref 0–5)
VARIANT LYMPHS # BLD: 1.7 % — SIGNIFICANT CHANGE UP (ref 0–5)
VARIANT LYMPHS # BLD: 4.5 % — SIGNIFICANT CHANGE UP (ref 0–5)
VIT B12 SERPL-MCNC: >2000 PG/ML — HIGH (ref 232–1245)
WBC # BLD: 3.06 K/UL — LOW (ref 4.8–10.8)
WBC # BLD: 3.36 K/UL — LOW (ref 4.8–10.8)
WBC # BLD: 3.72 K/UL — LOW (ref 4.8–10.8)
WBC # BLD: 3.85 K/UL — LOW (ref 4.8–10.8)
WBC # BLD: 4.25 K/UL — LOW (ref 4.8–10.8)
WBC # BLD: 4.33 K/UL — LOW (ref 4.8–10.8)
WBC # BLD: 4.41 K/UL — LOW (ref 4.8–10.8)
WBC # BLD: 4.43 K/UL — LOW (ref 4.8–10.8)
WBC # BLD: 4.53 K/UL — LOW (ref 4.8–10.8)
WBC # BLD: 4.69 K/UL — LOW (ref 4.8–10.8)
WBC # BLD: 5.14 K/UL — SIGNIFICANT CHANGE UP (ref 4.8–10.8)
WBC # BLD: 5.52 K/UL — SIGNIFICANT CHANGE UP (ref 4.8–10.8)
WBC # BLD: 5.89 K/UL — SIGNIFICANT CHANGE UP (ref 4.8–10.8)
WBC # BLD: 5.92 K/UL — SIGNIFICANT CHANGE UP (ref 4.8–10.8)
WBC # BLD: 6.03 K/UL — SIGNIFICANT CHANGE UP (ref 4.8–10.8)
WBC # BLD: 6.18 K/UL — SIGNIFICANT CHANGE UP (ref 4.8–10.8)
WBC # BLD: 6.3 K/UL — SIGNIFICANT CHANGE UP (ref 4.8–10.8)
WBC # BLD: 6.46 K/UL — SIGNIFICANT CHANGE UP (ref 4.8–10.8)
WBC # BLD: 6.67 K/UL — SIGNIFICANT CHANGE UP (ref 4.8–10.8)
WBC # BLD: 8.13 K/UL — SIGNIFICANT CHANGE UP (ref 4.8–10.8)
WBC # FLD AUTO: 3.06 K/UL — LOW (ref 4.8–10.8)
WBC # FLD AUTO: 3.36 K/UL — LOW (ref 4.8–10.8)
WBC # FLD AUTO: 3.72 K/UL — LOW (ref 4.8–10.8)
WBC # FLD AUTO: 3.85 K/UL — LOW (ref 4.8–10.8)
WBC # FLD AUTO: 4.25 K/UL — LOW (ref 4.8–10.8)
WBC # FLD AUTO: 4.33 K/UL — LOW (ref 4.8–10.8)
WBC # FLD AUTO: 4.41 K/UL — LOW (ref 4.8–10.8)
WBC # FLD AUTO: 4.43 K/UL — LOW (ref 4.8–10.8)
WBC # FLD AUTO: 4.53 K/UL — LOW (ref 4.8–10.8)
WBC # FLD AUTO: 4.69 K/UL — LOW (ref 4.8–10.8)
WBC # FLD AUTO: 5.14 K/UL — SIGNIFICANT CHANGE UP (ref 4.8–10.8)
WBC # FLD AUTO: 5.52 K/UL — SIGNIFICANT CHANGE UP (ref 4.8–10.8)
WBC # FLD AUTO: 5.89 K/UL — SIGNIFICANT CHANGE UP (ref 4.8–10.8)
WBC # FLD AUTO: 5.92 K/UL — SIGNIFICANT CHANGE UP (ref 4.8–10.8)
WBC # FLD AUTO: 6.03 K/UL — SIGNIFICANT CHANGE UP (ref 4.8–10.8)
WBC # FLD AUTO: 6.18 K/UL — SIGNIFICANT CHANGE UP (ref 4.8–10.8)
WBC # FLD AUTO: 6.3 K/UL — SIGNIFICANT CHANGE UP (ref 4.8–10.8)
WBC # FLD AUTO: 6.46 K/UL — SIGNIFICANT CHANGE UP (ref 4.8–10.8)
WBC # FLD AUTO: 6.67 K/UL — SIGNIFICANT CHANGE UP (ref 4.8–10.8)
WBC # FLD AUTO: 8.13 K/UL — SIGNIFICANT CHANGE UP (ref 4.8–10.8)
WBC UR QL: 10 /HPF — HIGH (ref 0–5)

## 2021-01-01 PROCEDURE — 93010 ELECTROCARDIOGRAM REPORT: CPT

## 2021-01-01 PROCEDURE — 93306 TTE W/DOPPLER COMPLETE: CPT | Mod: 26

## 2021-01-01 PROCEDURE — 99217: CPT

## 2021-01-01 PROCEDURE — 99282 EMERGENCY DEPT VISIT SF MDM: CPT

## 2021-01-01 PROCEDURE — 93970 EXTREMITY STUDY: CPT | Mod: 26

## 2021-01-01 PROCEDURE — 74177 CT ABD & PELVIS W/CONTRAST: CPT | Mod: 26,MA

## 2021-01-01 PROCEDURE — 71045 X-RAY EXAM CHEST 1 VIEW: CPT | Mod: 26,77

## 2021-01-01 PROCEDURE — 71045 X-RAY EXAM CHEST 1 VIEW: CPT | Mod: 26

## 2021-01-01 PROCEDURE — 93010 ELECTROCARDIOGRAM REPORT: CPT | Mod: 77

## 2021-01-01 PROCEDURE — 99220: CPT

## 2021-01-01 PROCEDURE — 43239 EGD BIOPSY SINGLE/MULTIPLE: CPT

## 2021-01-01 PROCEDURE — 99285 EMERGENCY DEPT VISIT HI MDM: CPT

## 2021-01-01 PROCEDURE — 76700 US EXAM ABDOM COMPLETE: CPT | Mod: 26

## 2021-01-01 PROCEDURE — 99218: CPT

## 2021-01-01 PROCEDURE — 88312 SPECIAL STAINS GROUP 1: CPT | Mod: 26

## 2021-01-01 PROCEDURE — 71046 X-RAY EXAM CHEST 2 VIEWS: CPT | Mod: 26

## 2021-01-01 PROCEDURE — 71045 X-RAY EXAM CHEST 1 VIEW: CPT | Mod: 26,59

## 2021-01-01 PROCEDURE — 99291 CRITICAL CARE FIRST HOUR: CPT

## 2021-01-01 PROCEDURE — 43255 EGD CONTROL BLEEDING ANY: CPT | Mod: 59

## 2021-01-01 PROCEDURE — 88305 TISSUE EXAM BY PATHOLOGIST: CPT | Mod: 26

## 2021-01-01 PROCEDURE — 88313 SPECIAL STAINS GROUP 2: CPT | Mod: 26

## 2021-01-01 PROCEDURE — 99215 OFFICE O/P EST HI 40 MIN: CPT | Mod: 95

## 2021-01-01 PROCEDURE — 99284 EMERGENCY DEPT VISIT MOD MDM: CPT

## 2021-01-01 PROCEDURE — 76705 ECHO EXAM OF ABDOMEN: CPT | Mod: 26

## 2021-01-01 RX ORDER — VANCOMYCIN HCL 1 G
500 VIAL (EA) INTRAVENOUS DAILY
Refills: 0 | Status: DISCONTINUED | OUTPATIENT
Start: 2021-01-01 | End: 2021-01-01

## 2021-01-01 RX ORDER — FUROSEMIDE 40 MG
20 TABLET ORAL ONCE
Refills: 0 | Status: COMPLETED | OUTPATIENT
Start: 2021-01-01 | End: 2021-01-01

## 2021-01-01 RX ORDER — FUROSEMIDE 40 MG
40 TABLET ORAL ONCE
Refills: 0 | Status: COMPLETED | OUTPATIENT
Start: 2021-01-01 | End: 2021-01-01

## 2021-01-01 RX ORDER — CHLORHEXIDINE GLUCONATE 213 G/1000ML
1 SOLUTION TOPICAL
Refills: 0 | Status: DISCONTINUED | OUTPATIENT
Start: 2021-01-01 | End: 2021-01-01

## 2021-01-01 RX ORDER — SODIUM BICARBONATE 1 MEQ/ML
150 SYRINGE (ML) INTRAVENOUS ONCE
Refills: 0 | Status: COMPLETED | OUTPATIENT
Start: 2021-01-01 | End: 2021-01-01

## 2021-01-01 RX ORDER — MAGNESIUM SULFATE 500 MG/ML
2 VIAL (ML) INJECTION ONCE
Refills: 0 | Status: COMPLETED | OUTPATIENT
Start: 2021-01-01 | End: 2021-01-01

## 2021-01-01 RX ORDER — PANTOPRAZOLE SODIUM 20 MG/1
40 TABLET, DELAYED RELEASE ORAL DAILY
Refills: 0 | Status: DISCONTINUED | OUTPATIENT
Start: 2021-01-01 | End: 2021-01-01

## 2021-01-01 RX ORDER — INFLUENZA VIRUS VACCINE 15; 15; 15; 15 UG/.5ML; UG/.5ML; UG/.5ML; UG/.5ML
0.5 SUSPENSION INTRAMUSCULAR ONCE
Refills: 0 | Status: DISCONTINUED | OUTPATIENT
Start: 2021-01-01 | End: 2021-01-01

## 2021-01-01 RX ORDER — CEFEPIME 1 G/1
500 INJECTION, POWDER, FOR SOLUTION INTRAMUSCULAR; INTRAVENOUS EVERY 24 HOURS
Refills: 0 | Status: DISCONTINUED | OUTPATIENT
Start: 2021-11-05 | End: 2021-01-01

## 2021-01-01 RX ORDER — WARFARIN SODIUM 2.5 MG/1
5 TABLET ORAL ONCE
Refills: 0 | Status: DISCONTINUED | OUTPATIENT
Start: 2021-01-01 | End: 2021-01-01

## 2021-01-01 RX ORDER — DEXTROSE 50 % IN WATER 50 %
100 SYRINGE (ML) INTRAVENOUS ONCE
Refills: 0 | Status: COMPLETED | OUTPATIENT
Start: 2021-01-01 | End: 2021-01-01

## 2021-01-01 RX ORDER — ACETAMINOPHEN 500 MG
650 TABLET ORAL EVERY 6 HOURS
Refills: 0 | Status: DISCONTINUED | OUTPATIENT
Start: 2021-01-01 | End: 2021-01-01

## 2021-01-01 RX ORDER — HEPARIN SODIUM 5000 [USP'U]/ML
5000 INJECTION INTRAVENOUS; SUBCUTANEOUS EVERY 12 HOURS
Refills: 0 | Status: DISCONTINUED | OUTPATIENT
Start: 2021-01-01 | End: 2021-01-01

## 2021-01-01 RX ORDER — PANTOPRAZOLE SODIUM 20 MG/1
40 TABLET, DELAYED RELEASE ORAL
Refills: 0 | Status: DISCONTINUED | OUTPATIENT
Start: 2021-01-01 | End: 2021-01-01

## 2021-01-01 RX ORDER — DEXTROSE 50 % IN WATER 50 %
50 SYRINGE (ML) INTRAVENOUS ONCE
Refills: 0 | Status: COMPLETED | OUTPATIENT
Start: 2021-01-01 | End: 2021-01-01

## 2021-01-01 RX ORDER — DEXMEDETOMIDINE HYDROCHLORIDE IN 0.9% SODIUM CHLORIDE 4 UG/ML
0.2 INJECTION INTRAVENOUS
Qty: 200 | Refills: 0 | Status: DISCONTINUED | OUTPATIENT
Start: 2021-01-01 | End: 2021-01-01

## 2021-01-01 RX ORDER — FINASTERIDE 5 MG/1
1 TABLET, FILM COATED ORAL
Qty: 0 | Refills: 0 | DISCHARGE

## 2021-01-01 RX ORDER — ATORVASTATIN CALCIUM 80 MG/1
20 TABLET, FILM COATED ORAL AT BEDTIME
Refills: 0 | Status: DISCONTINUED | OUTPATIENT
Start: 2021-01-01 | End: 2021-01-01

## 2021-01-01 RX ORDER — ROSUVASTATIN CALCIUM 5 MG/1
1 TABLET ORAL
Qty: 0 | Refills: 0 | DISCHARGE

## 2021-01-01 RX ORDER — ROSUVASTATIN CALCIUM 5 MG/1
5 TABLET, FILM COATED ORAL
Refills: 0 | Status: ACTIVE | COMMUNITY

## 2021-01-01 RX ORDER — SODIUM CHLORIDE 9 MG/ML
250 INJECTION INTRAMUSCULAR; INTRAVENOUS; SUBCUTANEOUS ONCE
Refills: 0 | Status: COMPLETED | OUTPATIENT
Start: 2021-01-01 | End: 2021-01-01

## 2021-01-01 RX ORDER — CEFTRIAXONE 500 MG/1
1000 INJECTION, POWDER, FOR SOLUTION INTRAMUSCULAR; INTRAVENOUS ONCE
Refills: 0 | Status: COMPLETED | OUTPATIENT
Start: 2021-01-01 | End: 2021-01-01

## 2021-01-01 RX ORDER — FUROSEMIDE 40 MG
1 TABLET ORAL
Qty: 0 | Refills: 0 | DISCHARGE

## 2021-01-01 RX ORDER — PANTOPRAZOLE SODIUM 20 MG/1
1 TABLET, DELAYED RELEASE ORAL
Qty: 0 | Refills: 0 | DISCHARGE

## 2021-01-01 RX ORDER — FUROSEMIDE 40 MG
60 TABLET ORAL
Refills: 0 | Status: DISCONTINUED | OUTPATIENT
Start: 2021-01-01 | End: 2021-01-01

## 2021-01-01 RX ORDER — EPINEPHRINE 0.3 MG/.3ML
0.03 INJECTION INTRAMUSCULAR; SUBCUTANEOUS
Qty: 4 | Refills: 0 | Status: DISCONTINUED | OUTPATIENT
Start: 2021-01-01 | End: 2021-01-01

## 2021-01-01 RX ORDER — DEXTROSE 50 % IN WATER 50 %
100 SYRINGE (ML) INTRAVENOUS ONCE
Refills: 0 | Status: DISCONTINUED | OUTPATIENT
Start: 2021-01-01 | End: 2021-01-01

## 2021-01-01 RX ORDER — FINASTERIDE 5 MG/1
5 TABLET, FILM COATED ORAL DAILY
Refills: 0 | Status: DISCONTINUED | OUTPATIENT
Start: 2021-01-01 | End: 2021-01-01

## 2021-01-01 RX ORDER — CEFUROXIME AXETIL 250 MG
1 TABLET ORAL
Qty: 20 | Refills: 0
Start: 2021-01-01 | End: 2021-01-01

## 2021-01-01 RX ORDER — CALCIUM ACETATE 667 MG
667 TABLET ORAL
Refills: 0 | Status: DISCONTINUED | OUTPATIENT
Start: 2021-01-01 | End: 2021-01-01

## 2021-01-01 RX ORDER — FENTANYL CITRATE 50 UG/ML
0.5 INJECTION INTRAVENOUS
Qty: 5000 | Refills: 0 | Status: DISCONTINUED | OUTPATIENT
Start: 2021-01-01 | End: 2021-01-01

## 2021-01-01 RX ORDER — HEPARIN SODIUM 5000 [USP'U]/ML
2000 INJECTION INTRAVENOUS; SUBCUTANEOUS
Refills: 0 | Status: DISCONTINUED | OUTPATIENT
Start: 2021-01-01 | End: 2021-01-01

## 2021-01-01 RX ORDER — CEFEPIME 1 G/1
500 INJECTION, POWDER, FOR SOLUTION INTRAMUSCULAR; INTRAVENOUS ONCE
Refills: 0 | Status: COMPLETED | OUTPATIENT
Start: 2021-01-01 | End: 2021-01-01

## 2021-01-01 RX ORDER — HYDROCORTISONE 20 MG
100 TABLET ORAL EVERY 8 HOURS
Refills: 0 | Status: DISCONTINUED | OUTPATIENT
Start: 2021-01-01 | End: 2021-01-01

## 2021-01-01 RX ORDER — NOREPINEPHRINE BITARTRATE/D5W 8 MG/250ML
0.05 PLASTIC BAG, INJECTION (ML) INTRAVENOUS
Qty: 8 | Refills: 0 | Status: DISCONTINUED | OUTPATIENT
Start: 2021-01-01 | End: 2021-01-01

## 2021-01-01 RX ORDER — AZITHROMYCIN 500 MG/1
500 TABLET, FILM COATED ORAL ONCE
Refills: 0 | Status: COMPLETED | OUTPATIENT
Start: 2021-01-01 | End: 2021-01-01

## 2021-01-01 RX ORDER — FUROSEMIDE 40 MG
60 TABLET ORAL ONCE
Refills: 0 | Status: COMPLETED | OUTPATIENT
Start: 2021-01-01 | End: 2021-01-01

## 2021-01-01 RX ORDER — VASOPRESSIN 20 [USP'U]/ML
0.04 INJECTION INTRAVENOUS
Qty: 50 | Refills: 0 | Status: DISCONTINUED | OUTPATIENT
Start: 2021-01-01 | End: 2021-01-01

## 2021-01-01 RX ORDER — CALCIUM ACETATE 667 MG
1 TABLET ORAL
Qty: 0 | Refills: 0 | DISCHARGE

## 2021-01-01 RX ORDER — CEFEPIME 1 G/1
INJECTION, POWDER, FOR SOLUTION INTRAMUSCULAR; INTRAVENOUS
Refills: 0 | Status: DISCONTINUED | OUTPATIENT
Start: 2021-01-01 | End: 2021-01-01

## 2021-01-01 RX ORDER — ERYTHROPOIETIN 10000 [IU]/ML
20000 INJECTION, SOLUTION INTRAVENOUS; SUBCUTANEOUS
Refills: 0 | Status: DISCONTINUED | OUTPATIENT
Start: 2021-01-01 | End: 2021-01-01

## 2021-01-01 RX ORDER — HEPARIN SODIUM 5000 [USP'U]/ML
5000 INJECTION INTRAVENOUS; SUBCUTANEOUS EVERY 8 HOURS
Refills: 0 | Status: DISCONTINUED | OUTPATIENT
Start: 2021-01-01 | End: 2021-01-01

## 2021-01-01 RX ORDER — NOREPINEPHRINE BITARTRATE/D5W 8 MG/250ML
0.05 PLASTIC BAG, INJECTION (ML) INTRAVENOUS
Qty: 16 | Refills: 0 | Status: DISCONTINUED | OUTPATIENT
Start: 2021-01-01 | End: 2021-01-01

## 2021-01-01 RX ORDER — PHENYLEPHRINE HYDROCHLORIDE 10 MG/ML
0.1 INJECTION INTRAVENOUS
Qty: 40 | Refills: 0 | Status: DISCONTINUED | OUTPATIENT
Start: 2021-01-01 | End: 2021-01-01

## 2021-01-01 RX ORDER — FUROSEMIDE 40 MG
4 TABLET ORAL
Qty: 0 | Refills: 0 | DISCHARGE

## 2021-01-01 RX ORDER — CEFTRIAXONE 500 MG/1
1000 INJECTION, POWDER, FOR SOLUTION INTRAMUSCULAR; INTRAVENOUS EVERY 24 HOURS
Refills: 0 | Status: DISCONTINUED | OUTPATIENT
Start: 2021-01-01 | End: 2021-01-01

## 2021-01-01 RX ORDER — DEXTROSE 10 % IN WATER 10 %
1000 INTRAVENOUS SOLUTION INTRAVENOUS
Refills: 0 | Status: DISCONTINUED | OUTPATIENT
Start: 2021-01-01 | End: 2021-01-01

## 2021-01-01 RX ORDER — FUROSEMIDE 20 MG/1
20 TABLET ORAL
Refills: 0 | Status: ACTIVE | COMMUNITY

## 2021-01-01 RX ORDER — FENTANYL CITRATE 50 UG/ML
0.5 INJECTION INTRAVENOUS
Qty: 2500 | Refills: 0 | Status: DISCONTINUED | OUTPATIENT
Start: 2021-01-01 | End: 2021-01-01

## 2021-01-01 RX ORDER — ALBUTEROL 90 UG/1
2 AEROSOL, METERED ORAL EVERY 6 HOURS
Refills: 0 | Status: DISCONTINUED | OUTPATIENT
Start: 2021-01-01 | End: 2021-01-01

## 2021-01-01 RX ORDER — TIOTROPIUM BROMIDE 18 UG/1
1 CAPSULE ORAL; RESPIRATORY (INHALATION) DAILY
Refills: 0 | Status: DISCONTINUED | OUTPATIENT
Start: 2021-01-01 | End: 2021-01-01

## 2021-01-01 RX ORDER — FUROSEMIDE 40 MG
80 TABLET ORAL
Refills: 0 | Status: DISCONTINUED | OUTPATIENT
Start: 2021-01-01 | End: 2021-01-01

## 2021-01-01 RX ORDER — IPRATROPIUM/ALBUTEROL SULFATE 18-103MCG
1 AEROSOL WITH ADAPTER (GRAM) INHALATION
Qty: 0 | Refills: 0 | DISCHARGE

## 2021-01-01 RX ORDER — HEPARIN SODIUM 5000 [USP'U]/ML
300 INJECTION INTRAVENOUS; SUBCUTANEOUS
Qty: 10000 | Refills: 0 | Status: DISCONTINUED | OUTPATIENT
Start: 2021-01-01 | End: 2021-01-01

## 2021-01-01 RX ORDER — AZITHROMYCIN 500 MG/1
500 TABLET, FILM COATED ORAL EVERY 24 HOURS
Refills: 0 | Status: DISCONTINUED | OUTPATIENT
Start: 2021-01-01 | End: 2021-01-01

## 2021-01-01 RX ADMIN — CEFTRIAXONE 100 MILLIGRAM(S): 500 INJECTION, POWDER, FOR SOLUTION INTRAMUSCULAR; INTRAVENOUS at 09:35

## 2021-01-01 RX ADMIN — HEPARIN SODIUM 5000 UNIT(S): 5000 INJECTION INTRAVENOUS; SUBCUTANEOUS at 04:51

## 2021-01-01 RX ADMIN — AZITHROMYCIN 255 MILLIGRAM(S): 500 TABLET, FILM COATED ORAL at 09:19

## 2021-01-01 RX ADMIN — HEPARIN SODIUM 5000 UNIT(S): 5000 INJECTION INTRAVENOUS; SUBCUTANEOUS at 16:12

## 2021-01-01 RX ADMIN — PANTOPRAZOLE SODIUM 40 MILLIGRAM(S): 20 TABLET, DELAYED RELEASE ORAL at 11:31

## 2021-01-01 RX ADMIN — SODIUM CHLORIDE 250 MILLILITER(S): 9 INJECTION INTRAMUSCULAR; INTRAVENOUS; SUBCUTANEOUS at 15:22

## 2021-01-01 RX ADMIN — Medication 650 MILLIGRAM(S): at 11:31

## 2021-01-01 RX ADMIN — CEFTRIAXONE 100 MILLIGRAM(S): 500 INJECTION, POWDER, FOR SOLUTION INTRAMUSCULAR; INTRAVENOUS at 09:19

## 2021-01-01 RX ADMIN — ATORVASTATIN CALCIUM 20 MILLIGRAM(S): 80 TABLET, FILM COATED ORAL at 21:20

## 2021-01-01 RX ADMIN — Medication 20 MILLIGRAM(S): at 05:30

## 2021-01-01 RX ADMIN — Medication 667 MILLIGRAM(S): at 11:36

## 2021-01-01 RX ADMIN — DEXMEDETOMIDINE HYDROCHLORIDE IN 0.9% SODIUM CHLORIDE 5.65 MICROGRAM(S)/KG/HR: 4 INJECTION INTRAVENOUS at 16:43

## 2021-01-01 RX ADMIN — FINASTERIDE 5 MILLIGRAM(S): 5 TABLET, FILM COATED ORAL at 16:44

## 2021-01-01 RX ADMIN — Medication 667 MILLIGRAM(S): at 09:43

## 2021-01-01 RX ADMIN — Medication 667 MILLIGRAM(S): at 11:31

## 2021-01-01 RX ADMIN — Medication 80 MILLIGRAM(S): at 17:11

## 2021-01-01 RX ADMIN — FINASTERIDE 5 MILLIGRAM(S): 5 TABLET, FILM COATED ORAL at 12:06

## 2021-01-01 RX ADMIN — PANTOPRAZOLE SODIUM 40 MILLIGRAM(S): 20 TABLET, DELAYED RELEASE ORAL at 05:29

## 2021-01-01 RX ADMIN — Medication 40 MILLIGRAM(S): at 01:20

## 2021-01-01 RX ADMIN — PHENYLEPHRINE HYDROCHLORIDE 4.71 MICROGRAM(S)/KG/MIN: 10 INJECTION INTRAVENOUS at 18:11

## 2021-01-01 RX ADMIN — ATORVASTATIN CALCIUM 20 MILLIGRAM(S): 80 TABLET, FILM COATED ORAL at 21:01

## 2021-01-01 RX ADMIN — ALBUTEROL 2 PUFF(S): 90 AEROSOL, METERED ORAL at 02:02

## 2021-01-01 RX ADMIN — Medication 80 MILLIGRAM(S): at 17:00

## 2021-01-01 RX ADMIN — Medication 80 MILLIGRAM(S): at 04:46

## 2021-01-01 RX ADMIN — HEPARIN SODIUM 5000 UNIT(S): 5000 INJECTION INTRAVENOUS; SUBCUTANEOUS at 04:46

## 2021-01-01 RX ADMIN — Medication 667 MILLIGRAM(S): at 08:19

## 2021-01-01 RX ADMIN — Medication 80 MILLIGRAM(S): at 16:13

## 2021-01-01 RX ADMIN — Medication 667 MILLIGRAM(S): at 17:00

## 2021-01-01 RX ADMIN — Medication 60 MILLIGRAM(S): at 16:55

## 2021-01-01 RX ADMIN — Medication 100 MILLIGRAM(S): at 15:22

## 2021-01-01 RX ADMIN — ATORVASTATIN CALCIUM 20 MILLIGRAM(S): 80 TABLET, FILM COATED ORAL at 23:00

## 2021-01-01 RX ADMIN — Medication 40 MILLIGRAM(S): at 21:49

## 2021-01-01 RX ADMIN — PANTOPRAZOLE SODIUM 40 MILLIGRAM(S): 20 TABLET, DELAYED RELEASE ORAL at 05:24

## 2021-01-01 RX ADMIN — HEPARIN SODIUM 5000 UNIT(S): 5000 INJECTION INTRAVENOUS; SUBCUTANEOUS at 06:25

## 2021-01-01 RX ADMIN — Medication 667 MILLIGRAM(S): at 16:12

## 2021-01-01 RX ADMIN — FENTANYL CITRATE 5.65 MICROGRAM(S)/KG/HR: 50 INJECTION INTRAVENOUS at 18:08

## 2021-01-01 RX ADMIN — ATORVASTATIN CALCIUM 20 MILLIGRAM(S): 80 TABLET, FILM COATED ORAL at 20:47

## 2021-01-01 RX ADMIN — FINASTERIDE 5 MILLIGRAM(S): 5 TABLET, FILM COATED ORAL at 11:36

## 2021-01-01 RX ADMIN — HEPARIN SODIUM 5000 UNIT(S): 5000 INJECTION INTRAVENOUS; SUBCUTANEOUS at 16:59

## 2021-01-01 RX ADMIN — HEPARIN SODIUM 5000 UNIT(S): 5000 INJECTION INTRAVENOUS; SUBCUTANEOUS at 22:36

## 2021-01-01 RX ADMIN — CHLORHEXIDINE GLUCONATE 1 APPLICATION(S): 213 SOLUTION TOPICAL at 05:19

## 2021-01-01 RX ADMIN — Medication 150 MILLIEQUIVALENT(S): at 18:35

## 2021-01-01 RX ADMIN — FINASTERIDE 5 MILLIGRAM(S): 5 TABLET, FILM COATED ORAL at 11:16

## 2021-01-01 RX ADMIN — CHLORHEXIDINE GLUCONATE 1 APPLICATION(S): 213 SOLUTION TOPICAL at 04:46

## 2021-01-01 RX ADMIN — Medication 20 MILLIGRAM(S): at 22:21

## 2021-01-01 RX ADMIN — Medication 50 MILLILITER(S): at 15:11

## 2021-01-01 RX ADMIN — HEPARIN SODIUM 5000 UNIT(S): 5000 INJECTION INTRAVENOUS; SUBCUTANEOUS at 07:03

## 2021-01-01 RX ADMIN — EPINEPHRINE 14.1 MICROGRAM(S)/KG/MIN: 0.3 INJECTION INTRAMUSCULAR; SUBCUTANEOUS at 18:45

## 2021-01-01 RX ADMIN — Medication 50 GRAM(S): at 10:35

## 2021-01-01 RX ADMIN — Medication 50 MILLILITER(S): at 11:52

## 2021-01-01 RX ADMIN — CEFEPIME 500 MILLIGRAM(S): 1 INJECTION, POWDER, FOR SOLUTION INTRAMUSCULAR; INTRAVENOUS at 17:31

## 2021-01-01 RX ADMIN — HEPARIN SODIUM 5000 UNIT(S): 5000 INJECTION INTRAVENOUS; SUBCUTANEOUS at 05:23

## 2021-01-01 RX ADMIN — Medication 40 MILLIGRAM(S): at 05:33

## 2021-01-01 RX ADMIN — Medication 80 MILLIGRAM(S): at 05:23

## 2021-01-01 RX ADMIN — Medication 667 MILLIGRAM(S): at 12:06

## 2021-01-01 RX ADMIN — Medication 667 MILLIGRAM(S): at 17:11

## 2021-01-01 RX ADMIN — FINASTERIDE 5 MILLIGRAM(S): 5 TABLET, FILM COATED ORAL at 14:18

## 2021-01-01 RX ADMIN — ERYTHROPOIETIN 20000 UNIT(S): 10000 INJECTION, SOLUTION INTRAVENOUS; SUBCUTANEOUS at 09:40

## 2021-01-01 RX ADMIN — Medication 100 MILLIGRAM(S): at 09:35

## 2021-01-01 RX ADMIN — VASOPRESSIN 2.4 UNIT(S)/MIN: 20 INJECTION INTRAVENOUS at 12:18

## 2021-01-01 RX ADMIN — CHLORHEXIDINE GLUCONATE 1 APPLICATION(S): 213 SOLUTION TOPICAL at 05:17

## 2021-01-01 RX ADMIN — Medication 80 MILLIGRAM(S): at 04:51

## 2021-01-01 RX ADMIN — FINASTERIDE 5 MILLIGRAM(S): 5 TABLET, FILM COATED ORAL at 17:31

## 2021-01-01 RX ADMIN — Medication 25 MILLILITER(S): at 14:50

## 2021-01-01 RX ADMIN — Medication 667 MILLIGRAM(S): at 07:44

## 2021-01-01 RX ADMIN — CHLORHEXIDINE GLUCONATE 1 APPLICATION(S): 213 SOLUTION TOPICAL at 04:51

## 2021-01-01 RX ADMIN — Medication 50 MILLILITER(S): at 14:23

## 2021-01-01 RX ADMIN — AZITHROMYCIN 255 MILLIGRAM(S): 500 TABLET, FILM COATED ORAL at 09:35

## 2021-01-01 RX ADMIN — Medication 667 MILLIGRAM(S): at 11:16

## 2021-01-01 RX ADMIN — Medication 10.6 MICROGRAM(S)/KG/MIN: at 16:44

## 2021-01-01 RX ADMIN — Medication 650 MILLIGRAM(S): at 13:15

## 2021-01-01 RX ADMIN — PANTOPRAZOLE SODIUM 40 MILLIGRAM(S): 20 TABLET, DELAYED RELEASE ORAL at 05:38

## 2021-01-01 RX ADMIN — Medication 40 MILLIGRAM(S): at 10:14

## 2021-01-01 RX ADMIN — CEFTRIAXONE 100 MILLIGRAM(S): 500 INJECTION, POWDER, FOR SOLUTION INTRAMUSCULAR; INTRAVENOUS at 03:57

## 2021-01-01 RX ADMIN — Medication 667 MILLIGRAM(S): at 07:52

## 2021-01-01 RX ADMIN — Medication 100 MILLILITER(S): at 16:20

## 2021-01-01 RX ADMIN — Medication 667 MILLIGRAM(S): at 14:18

## 2021-01-01 RX ADMIN — Medication 60 MILLIGRAM(S): at 06:25

## 2021-01-01 RX ADMIN — Medication 5.79 MICROGRAM(S)/KG/MIN: at 09:00

## 2021-01-01 RX ADMIN — PANTOPRAZOLE SODIUM 40 MILLIGRAM(S): 20 TABLET, DELAYED RELEASE ORAL at 06:25

## 2021-01-01 RX ADMIN — HEPARIN SODIUM 5000 UNIT(S): 5000 INJECTION INTRAVENOUS; SUBCUTANEOUS at 14:51

## 2021-01-22 NOTE — ED PROVIDER NOTE - OBJECTIVE STATEMENT
69 yo male with a  pmh of ESRD HD TuesThursSat, HTN, and DM presents for low hgb. pt states to noted generalized weakness over the past month but denies any other symptoms including fevers, chill, headache, recent illness/travel, cough, abdominal pain, chest pain, or SOB. pt states to have one episode on bloody stool 2 days prior.

## 2021-01-22 NOTE — ED CDU PROVIDER DISPOSITION NOTE - CLINICAL COURSE
Patient with PMH ESRD on HD TRS, MARLI on CPAP, AF not on AOC 2/2 GIB, who presents to [facility] for gradually worsening generalized weakness x1 month. he reports that blood work from last week reflected Hb of 7. While getting dialysis yesterday, they noted his Hb was low and told him to come to the ER. pt reports small brbpr 2d prior, denies melanotic stool. Denies CP, SOB, n/v/d/f/c. EKG nsr non-ischemic, CXR with moderate congestion. Hb 6.5. Pt placed in obs for 1uprbc. Pt reported improvement in Sx and no longer lightheaded, well appearing. Pt ambulatory with a  steady gait.

## 2021-01-22 NOTE — ED PROVIDER NOTE - PHYSICAL EXAMINATION
Gen: NAD, AOx3  Head: NCAT  HEENT: PERRL, oral mucosa moist, normal conjunctiva, oropharynx clear without exudate or erythema  Lung: CTAB, no respiratory distress, no wheezing, rales, rhonchi  CV: normal s1/s2, rrr, Normal perfusion, pulses 2+ throughout  Abd: soft, NTND, no CVA tenderness, no blood on DENISE (chaperone- [employer] student Yuli)  Genitourinary: no pelvic tenderness  MSK: No edema, no visible deformities, full range of motion in all 4 extremities  Neuro: No focal neurologic deficits  Skin: No rash   Psych: normal affect

## 2021-01-22 NOTE — ED PROVIDER NOTE - ATTENDING CONTRIBUTION TO CARE
Patient with PMH ESRD on HD TRS, MARLI on CPAP, AF not on AOC 2/2 GIB, who presents to [facility] for gradually worsening generalized weakness x1 month. he reports that blood work from last week reflected Hb of 7. While getting dialysis yesterday, they noted his Hb was low and told him to come to the ER. pt reports small brbpr 2d prior, denies melanotic stool. Denies CP, SOB, n/v/d/f/c.     Gen - NAD, Head - NCAT, TMs - clear b/l, Pharynx - clear, MMM, Heart - RRR, no m/g/r, Lungs - CTAB, no w/c/r, Abdomen - soft, NT, ND, Skin - No rash, Extremities - FROM, no edema, erythema, ecchymosis, Neuro - CN 2-12 intact, nl strength and sensation, nl gait.    a/p; will obtain labs, ekg, and reassess.

## 2021-01-22 NOTE — ED CDU PROVIDER INITIAL DAY NOTE - OBJECTIVE STATEMENT
Pt with ETOH Cirrhosis, HTN, DM ESRD on HD (Tue, Th, Sat) Afib, MARLI, PPM, ex smoker came to ED for low H/H and feeling light headed and tired. Denies CP, melena, NV, fever or chills. ED workup confirmed low Hgb. Pt was placed in OBS for transfusion and reassess

## 2021-01-22 NOTE — ED PROVIDER NOTE - CLINICAL SUMMARY MEDICAL DECISION MAKING FREE TEXT BOX
Patient with PMH ESRD on HD TRS, MARLI on CPAP, AF not on AOC 2/2 GIB, who presents to [facility] for gradually worsening generalized weakness x1 month. he reports that blood work from last week reflected Hb of 7. While getting dialysis yesterday, they noted his Hb was low and told him to come to the ER. pt reports small brbpr 2d prior, denies melanotic stool. Denies CP, SOB, n/v/d/f/c. EKG nsr non-ischemic, CXR with moderate congestion. Hb 6.5. Will place pt in obs for 1uprbc to avoid hypervomemia. Will reassess.

## 2021-01-22 NOTE — ED CDU PROVIDER INITIAL DAY NOTE - NS ED ROS FT
CONST: No fever, chills or bodyaches  EYES: No pain, redness, drainage or visual changes.  ENT: No ear pain or discharge, nasal discharge or congestion. No sore throat  CARD: No chest pain, palpitations  RESP: No SOB, cough, hemoptysis. No hx of asthma or COPD  GI: No abdominal pain, N/V/D  MS: No joint pain, back pain or extremity pain/injury  SKIN: No rashes  NEURO: No headache, paresthesias or LOC

## 2021-01-22 NOTE — ED CDU PROVIDER INITIAL DAY NOTE - MEDICAL DECISION MAKING DETAILS
Patient with PMH ESRD on HD TRS, MARLI on CPAP, AF not on AOC 2/2 GIB, who presents to [facility] for gradually worsening generalized weakness x1 month. he reports that blood work from last week reflected Hb of 7. While getting dialysis yesterday, they noted his Hb was low and told him to come to the ER. pt reports small brbpr 2d prior, denies melanotic stool. Denies CP, SOB, n/v/d/f/c. EKG nsr non-ischemic, CXR with moderate congestion. Hb 6.5. Will place pt in obs for 1uprbc to avoid hypervomemia. Will reassess.

## 2021-01-22 NOTE — ED CDU PROVIDER INITIAL DAY NOTE - PHYSICAL EXAMINATION
CONST: Well appearing in NAD  EYES: PERRL, EOMI, Sclera and conjunctiva pale  ENT: Oropharynx normal appearing, no erythema or exudates. Uvula midline.  NECK: Non-tender, no meningeal signs  CARD: Normal S1 S2; Normal rate and rhythm  RESP: Equal BS B/L, No wheezes, rhonchi or rales. No distress  GI: (+) Ascites. Non tender. No rebound  MS: Normal ROM in all extremities. No midline spinal tenderness.  SKIN: Warm, dry, no acute rashes. Good turgor  NEURO: A&Ox3, No focal deficits. Strength 5/5 with no sensory deficits. Steady gait

## 2021-02-12 NOTE — ED ADULT NURSE NOTE - OBJECTIVE STATEMENT
Patient presents to ED for low hemoglobin level as reported by patients daughter. Patient also reports shortness of breath on exertion and worsened bilateral lower extremity edema over last week.

## 2021-02-12 NOTE — H&P ADULT - NSICDXPASTMEDICALHX_GEN_ALL_CORE_FT
PAST MEDICAL HISTORY:  Diabetes mellitus     ESRD (end stage renal disease) on dialysis     H/O rheumatic heart disease     HTN (hypertension)     MARLI (obstructive sleep apnea)

## 2021-02-12 NOTE — ED ADULT TRIAGE NOTE - CHIEF COMPLAINT QUOTE
pt sent for hemoglobin of 6.3, hx of renal failure. having exertional sob as well,     [phone]. nava elizabeth (daughter)  [phone]             alexis (daughter)

## 2021-02-12 NOTE — H&P ADULT - NSHPPHYSICALEXAM_GEN_ALL_CORE
T(C): 36 (02-12-21 @ 20:46), Max: 36.1 (02-12-21 @ 17:10)  HR: 65 (02-12-21 @ 20:46) (65 - 65)  BP: 103/53 (02-12-21 @ 20:46) (100/50 - 113/59)  RR: 19 (02-12-21 @ 20:46) (17 - 19)  SpO2: 98% (02-12-21 @ 20:46) (98% - 98%)    PHYSICAL EXAM:  GENERAL: NAD, well-developed, conversive   HEAD:  Atraumatic, Normocephalic  EYES: EOMI, PERRLA, conjunctiva and sclera clear  ENT:No nasal obstruction or discharge. No tonsillar exudate, swelling or erythema.  NECK: Supple, No JVD  CHEST/LUNG: Bibasilar crackles  HEART: Regular rate and rhythm; No murmurs, rubs, or gallops  ABDOMEN: Soft, Nontender, Distended with positive fluid wave. Bowel sounds present  EXTREMITIES:  2+ Peripheral Pulses, No clubbing, cyanosis. 2+ LE edema  PSYCH: AAOx3  NEUROLOGY: non-focal, sensation equal and intact bilaterally, cranial nerves ii-xii grossly intact,   SKIN: No rashes or lesions

## 2021-02-12 NOTE — H&P ADULT - ASSESSMENT
This is a 71 year old male with PMHx of HTN, ESRD on HD T/Th/S, A-fib not on A/c due to Upper GI bleed, type 2 DM, MARLI not on C-PAP, Alcoholic Liver Cirrhosis, Rheumatic Heart Disease s/p Aortic and Mitral Valve Replacement who presented to the ED with worsening shortness of breath    #Worsening shortness of breath: symptomatic anemia vs CHF exacerbation (more likely) vs fluid overload  #History of CHFrEF   - Admit to med/surg  - BNP > 70,000  - TTE noted EF history of <20%; s/p AICD  - Continue with Lasix 60mg IV BID (still making urine)  - Daily weights  - Strict Is and Os  - 1.2L fluid restriction  - Abdominal US to assess for possible paracentesis  - [employer] Pacemaker [id number], RVS lead 2088TC/58 [id number] and RA lead 2088TC/52 [id number]    #ESRD on HD t/th/s  - Follows Dr. Amin  - Check Phos  - HD due tomorrow  - Continue on Calcium Acetate 1 tab with meals    #Acute on chronic macrocytic anemia  - No signs of bleeding  - DENISE refused   - Iron Panel, B12, Folate ordered; but patient transfused. Iron panel results to be inconclusive  - s/p 2 units of PRBC in the ED     #Troponinemia  - Troponin 0.21 which is consistent with baseline  - Troponin elevations secondary to ESRD and inability to clear via kidneys.     #HTN  - Continue on Lasix but note IV dosing now (60mg BID)    #HLD  - Continue on Atorvastatin 20mg in place of Rosuvastatin 5mg daily     #Rheumatic Heart Disease s/p Mitral and Aortic valve replacement  - Aortic Valve: Medtronic -28 Serial: [id number]  - Mitral Valve: Medtronix [id number] Serial: [id number]  - Peterson Pericardial Heart Valve [id number]    Activity: As tolerated  Diet: DASH/Renal 1.2 L restriction  DVT ppx: Heparin  GI ppx: Protonix  Code Status: Full Code  DISPO: Acute

## 2021-02-12 NOTE — H&P ADULT - NSHPLABSRESULTS_GEN_ALL_CORE
6.0    6.67  )-----------( 159      ( 12 Feb 2021 16:14 )             19.8     02-12    136  |  91<L>  |  42<H>  ----------------------------<  141<H>  3.8   |  31  |  5.5<HH>    Ca    8.9      12 Feb 2021 16:14    TPro  7.2  /  Alb  3.5  /  TBili  0.9  /  DBili  x   /  AST  35  /  ALT  13  /  AlkPhos  399<H>  02-12       CARDIAC MARKERS ( 12 Feb 2021 16:14 )  x     / 0.21 ng/mL / x     / x     / x        CXR: Sternotomy wires in place, enlarged heart, trace pleural effusions

## 2021-02-12 NOTE — ED PROVIDER NOTE - OBJECTIVE STATEMENT
70 yo male hx of htn, ESRD on TTHS (last on thursday), afib not on ac due to upper GI bleed, dm, ethan, rheumatic heart disease s/p aortic, mitral valve replacement, alcoholic liver cirrhosis presenting with worsening sob over the past few days associated with orthopnea and increasing calf edema. Takes lasix 20 mg daily and still makes urine. Was also found to have hgb 6.4 for routine bloodwork taken yesterday. Denies cp, fever, abd pain, n/v, melena, hematochezia. Has not seen endoscopy for 1 hour.     Nephro: aristeo  GI: carlos

## 2021-02-12 NOTE — ED PROVIDER NOTE - NS ED ROS FT
Constitutional:  see HPI  Head:  no headache, dizziness, loss of consciousness  Eyes:  no visual changes; no eye pain, redness, or discharge  ENMT:  no ear pain or discharge; no hearing problems; no mouth or throat sores or lesions; no throat pain  Cardiac: no chest pain, tachycardia or palpitations  Respiratory: sob  GI: no nausea, vomiting, diarrhea or abdominal pain  :  no dysuria, frequency, or burning with urination; no change in urine output  MS: no myalgias, muscle weakness, joint pain,or  injury; no joint swelling  Neuro: no weakness; no numbness or tingling; no seizure  Skin:  no rashes or color changes; no lacerations or abrasions

## 2021-02-12 NOTE — H&P ADULT - HISTORY OF PRESENT ILLNESS
This is a 71 year old male with PMHx of HTN, ESRD on HD T/Th/S, A-fib not on A/c due to Upper GI bleed, type 2 DM, MARLI not on C-PAP, Alcoholic Liver Cirrhosis, Rheumatic Heart Disease s/p Aortic and Mitral Valve Replacement who presented to the ED with worsening shortness of breath. The patient reported that for the past few days he has been having worsening shortness of breath with increased orthopnea and calf edema. He has been compliant with his HD; last dialyzed on Thursday. He had routine blood work done at HD yesterday and his hemoglobin was found to be 6.4. He reports no signs of any bleeding with a DENISE refused in the ED. He has been taking 20mg PO lasix and makes minimal urine. Reported a CT scan recently done as an outpatient with bilateral pleural effusions as well as some abdominal distension.    In the ED initial vitals: /50, HR 65, Sat 98% on room air. Hemoglobin 6.0, BNP >70,0000, Troponin 0.21 ( stable from baseline). Given IV 60mg lasix 3 units of PRBC and admitted to medicine.

## 2021-02-12 NOTE — ED PROVIDER NOTE - ATTENDING CONTRIBUTION TO CARE
71 yr old m w/ a pmh significant for esrd (THS), htn, dm who presents with sob and also for abnormal labs. Pt states that he was sent over by dialysis clinic as his hgb was noted to be below 7. Pt also states that for the past couple of days he has been having SOB with exertion. Pt did have a CT chest outpatient, ordered by his pulmonologist. Pt was called and informed that he had "fluids in his lungs". Pt denies any fevers, chills, nausea, vomiting, diarrhea or any other complaints.     VITAL SIGNS: I have reviewed nursing notes and confirm.  CONSTITUTIONAL: non-toxic, well appearing  SKIN: no rash, no petechiae.  EYES: PERRL, EOMI, pink conjunctiva, anicteric  ENT: tongue midline, no exudates, MMM  NECK: Supple; no meningismus, + JVD  CARD: RRR, no murmurs, equal radial pulses bilaterally 2+  RESP: crackles throughout   ABD: Soft, non-tender, non-distended, no peritoneal signs, no HSM, no CVA tenderness  EXT: Normal ROM x4. No edema. No calves tenderness  NEURO: Alert, oriented. CN2-12 intact, equal strength bilaterally, nl gait.  PSYCH: Cooperative, appropriate.    a/p  71 yr old m that presents with sob, concern for symptomatic anemia  -labs  -ekg  -cxr  -consider transfusion   -consider admission

## 2021-02-12 NOTE — H&P ADULT - NSHPREVIEWOFSYSTEMS_GEN_ALL_CORE
General: No fevers, chills, weight changes	  Skin/Breast: No skin rashes or wounds  Ophthalmologic: No blurry vision, double vision, recent changes in vision  ENMT: No difficulty hearing, ringing in ears, nasal discharge, throat pain, difficulty swallowing  Respiratory and Thorax: No coughing, wheezing, + shortness of breath  Cardiovascular: No chest pain, palpitations  Gastrointestinal: No abdominal pain, constipation, diarrhea, nausea, vomiting. + Abdominal distension   Genitourinary: No dysuria, polyuria, pyuria, hematuria  Musculoskeletal: No muscle aches or joint aches  Neurological: No numbness or tingling  Psychiatric: Regular mood  Hematology/Lymphatics: No easy bruising	  Endocrine: No hot or cold intolerance

## 2021-02-12 NOTE — ED PROVIDER NOTE - CLINICAL SUMMARY MEDICAL DECISION MAKING FREE TEXT BOX
71 yr old m that presents with sob and weakness. labs, cxr, ekg obtained. on labs pt noted to have a hgb<7, increase pro bnp and troponin at baseline. pt admitted to medicine for chf exacerbation and symptomatic anemia

## 2021-02-12 NOTE — H&P ADULT - ATTENDING COMMENTS
Patient seen and examined at bedside, agree with above. Acute exacerbation of CHFrEF: HD as planned. Cardiology consult. Remainder of medications as ordered. 2DECHO. Fall/aspiration/decubitus precautions Patient seen and examined at bedside, agree with above. Acute exacerbation of CHFrEF: HD as planned. IV lasix as ordered. Cardiology consult. Remainder of medications as ordered. 2DECHO. Anemia R/O GI bleed: Transfused 2U PRBCs. GI consult. Fall/aspiration/decubitus precautions. Prognosis guarded

## 2021-02-13 NOTE — PROGRESS NOTE ADULT - SUBJECTIVE AND OBJECTIVE BOX
SUBJECTIVE:    Patient is a 71y old Male who presents with a chief complaint of Anemia and Fluid Overload (13 Feb 2021 10:43)    Currently admitted to medicine with the primary diagnosis of Anemia.     Today is hospital day 1d. This morning he is resting comfortably in bed and reports no new issues or overnight events.     PAST MEDICAL & SURGICAL HISTORY  MARLI (obstructive sleep apnea)    H/O rheumatic heart disease    ESRD (end stage renal disease) on dialysis    Diabetes mellitus    HTN (hypertension)    AV fistula  right upper arm    H/O cardiac pacemaker    H/O mitral valve replacement    H/O aortic valve replacement    ALLERGIES:  No Known Allergies    MEDICATIONS:  STANDING MEDICATIONS  ALBUTerol    90 MICROgram(s) HFA Inhaler 2 Puff(s) Inhalation every 6 hours  atorvastatin 20 milliGRAM(s) Oral at bedtime  calcium acetate 667 milliGRAM(s) Oral three times a day with meals  chlorhexidine 4% Liquid 1 Application(s) Topical <User Schedule>  epoetin adalberto-epbx (RETACRIT) Injectable 02521 Unit(s) IV Push <User Schedule>  finasteride 5 milliGRAM(s) Oral daily  furosemide   Injectable 80 milliGRAM(s) IV Push two times a day  heparin   Injectable 5000 Unit(s) SubCutaneous every 12 hours  heparin   Injectable. 2000 Unit(s) Dialysis. <User Schedule>  influenza   Vaccine 0.5 milliLiter(s) IntraMuscular once  pantoprazole    Tablet 40 milliGRAM(s) Oral before breakfast  tiotropium 18 MICROgram(s) Capsule 1 Capsule(s) Inhalation daily    PRN MEDICATIONS    VITALS:   T(F): 96.7  HR: 65  BP: 92/52  RR: 18  SpO2: 97%    LABS:                        6.9    6.46  )-----------( 146      ( 13 Feb 2021 07:27 )             21.2     02-13    137  |  93<L>  |  53<H>  ----------------------------<  90  4.0   |  31  |  6.3<HH>    Ca    9.3      13 Feb 2021 07:27  Phos  3.6     02-13  Mg     1.9     02-13    TPro  6.8  /  Alb  3.2<L>  /  TBili  1.1  /  DBili  x   /  AST  34  /  ALT  11  /  AlkPhos  387<H>  02-13          Troponin T, Serum: 0.21 ng/mL <HH> (02-12-21 @ 16:14)      CARDIAC MARKERS ( 12 Feb 2021 16:14 )  x     / 0.21 ng/mL / x     / x     / x          RADIOLOGY:  < from: US Abdomen Limited (02.13.21 @ 08:12) >    FINDINGS/  impression:: Abdominal and pelvic mild ascites.: Cirrhotic liver    < end of copied text >  < from: Xray Chest 1 View- PORTABLE-Urgent (Xray Chest 1 View- PORTABLE-Urgent .) (02.12.21 @ 17:46) >  Impression:    No radiographic evidence of acute cardiopulmonary disease. Stable cardiomegaly    < end of copied text >      PHYSICAL EXAM:  GEN: No acute distress  LUNGS: Clear to auscultation bilaterally   HEART: Regular  ABD: Soft, non-tender, non-distended.  EXT: NC/NC/NE/2+PP/BROWNLEE/Skin Intact.   NEURO: AAOX3     SUBJECTIVE:    Patient is a 71y old Male who presents with a chief complaint of Anemia and Fluid Overload (13 Feb 2021 10:43)    Currently admitted to medicine with the primary diagnosis of Anemia.     Today is hospital day 1d. This morning pt is in dialysis. No overnight events.     PAST MEDICAL & SURGICAL HISTORY  MARLI (obstructive sleep apnea)    H/O rheumatic heart disease    ESRD (end stage renal disease) on dialysis    Diabetes mellitus    HTN (hypertension)    AV fistula  right upper arm    H/O cardiac pacemaker    H/O mitral valve replacement    H/O aortic valve replacement    ALLERGIES:  No Known Allergies    MEDICATIONS:  STANDING MEDICATIONS  ALBUTerol    90 MICROgram(s) HFA Inhaler 2 Puff(s) Inhalation every 6 hours  atorvastatin 20 milliGRAM(s) Oral at bedtime  calcium acetate 667 milliGRAM(s) Oral three times a day with meals  chlorhexidine 4% Liquid 1 Application(s) Topical <User Schedule>  epoetin adalberto-epbx (RETACRIT) Injectable 95152 Unit(s) IV Push <User Schedule>  finasteride 5 milliGRAM(s) Oral daily  furosemide   Injectable 80 milliGRAM(s) IV Push two times a day  heparin   Injectable 5000 Unit(s) SubCutaneous every 12 hours  heparin   Injectable. 2000 Unit(s) Dialysis. <User Schedule>  influenza   Vaccine 0.5 milliLiter(s) IntraMuscular once  pantoprazole    Tablet 40 milliGRAM(s) Oral before breakfast  tiotropium 18 MICROgram(s) Capsule 1 Capsule(s) Inhalation daily    PRN MEDICATIONS    VITALS:   T(F): 96.7  HR: 65  BP: 92/52  RR: 18  SpO2: 97%    LABS:                        6.9    6.46  )-----------( 146      ( 13 Feb 2021 07:27 )             21.2     02-13    137  |  93<L>  |  53<H>  ----------------------------<  90  4.0   |  31  |  6.3<HH>    Ca    9.3      13 Feb 2021 07:27  Phos  3.6     02-13  Mg     1.9     02-13    TPro  6.8  /  Alb  3.2<L>  /  TBili  1.1  /  DBili  x   /  AST  34  /  ALT  11  /  AlkPhos  387<H>  02-13          Troponin T, Serum: 0.21 ng/mL <HH> (02-12-21 @ 16:14)      CARDIAC MARKERS ( 12 Feb 2021 16:14 )  x     / 0.21 ng/mL / x     / x     / x          RADIOLOGY:  < from: US Abdomen Limited (02.13.21 @ 08:12) >    FINDINGS/  impression:: Abdominal and pelvic mild ascites.: Cirrhotic liver    < end of copied text >  < from: Xray Chest 1 View- PORTABLE-Urgent (Xray Chest 1 View- PORTABLE-Urgent .) (02.12.21 @ 17:46) >  Impression:    No radiographic evidence of acute cardiopulmonary disease. Stable cardiomegaly    < end of copied text >      PHYSICAL EXAM:  unable to obtain as pt is in dialysis

## 2021-02-13 NOTE — PROGRESS NOTE ADULT - ASSESSMENT
This is a 71 year old male with PMHx of HTN, ESRD on HD T/Th/S, A-fib not on A/c due to Upper GI bleed, type 2 DM, MARLI not on C-PAP, Alcoholic Liver Cirrhosis, Rheumatic Heart Disease s/p Aortic and Mitral Valve Replacement who presented to the ED with worsening shortness of breath    #Worsening shortness of breath likely due to symptomatic anemia vs CHF exacerbation vs fluid overload  #History of CHFrEF   - BNP > 70,000. Previous admission on 12/2019: BMP in 60K  - TTE  (12/2019): EF history of <20%; s/p AICD  - increased Lasix 80mg IV BID (still making urine)  - Daily weights  - Strict Is and Os  - 1.2L fluid restriction  - Abdominal US( 2/13/21): Abdominal and pelvic mild ascites, Cirrhotic liver  - St Young Pacemaker [id number], RVS lead 2088TC/58 [id number] and RA lead 2088TC/52 [id number]  - F/u ECHO  - f/u Cardio consult    #Acute on chronic macrocytic anemia  - No signs of bleeding, s/p 2 units of PRBC in the ED   - DENISE refused   - Iron Panel, B12, Folate ordered; but patient transfused. Iron panel results to be inconclusive  - f/u tsat% and ferritin  - F/u Hemolysis panel: LDH, Haptoglobin, tot bili  - stool guaiac  - f/u CBC Q 12H,  PRBC transfusion if dropping  - CT abdomen is HGB is decrease  - f/u GI consult    #ESRD on HD t/th/s  - Follows Dr. Amin  - Phos 3.6 WNL  - s/p HD today via RUE aneurysmal AVF  - Continue on Phoslo: Calcium Acetate 1 tab with meals  - Nephro consult appreciated: started epogen 20,000U IV QHD, increased to lasix 80mg iv q12h while inpatient  - low K+ renal diet / fluid restriction    #Troponinemia  - Troponin 0.21 which is consistent with baseline  - Troponin elevations secondary to ESRD and inability to clear via kidneys.     #HTN  - Continue on Lasix 80 mg IV BID    #HLD  - Continue on Atorvastatin 20mg in place of Rosuvastatin 5mg daily     #Rheumatic Heart Disease s/p Mitral and Aortic valve replacement  - Aortic Valve: Medtronic -17 Serial: [id number]  - Mitral Valve: Medtronix 310C27 Serial: [id number]  - Peterson Pericardial Heart Valve [id number]    Activity: As tolerated  Diet: DASH/Renal 1.2 L restriction  DVT ppx: Heparin  GI ppx: Protonix  Code Status: Full Code  DISPO: Acute  This is a 71 year old male with PMHx of HTN, ESRD on HD T/Th/S, A-fib not on A/c due to Upper GI bleed, type 2 DM, MARLI not on C-PAP, Alcoholic Liver Cirrhosis, Rheumatic Heart Disease s/p Aortic and Mitral Valve Replacement who presented to the ED with worsening shortness of breath    #Worsening shortness of breath likely due to symptomatic anemia vs CHF exacerbation vs fluid overload  #History of CHFrEF   - BNP > 70,000. Previous admission on 12/2019: BMP in 60K  - TTE  (12/2019): EF history of <20%; s/p AICD  - increased Lasix 80mg IV BID (still making urine)  - Daily weights  - Strict Is and Os  - 1.2L fluid restriction  - Abdominal US( 2/13/21): Abdominal and pelvic mild ascites, Cirrhotic liver  - St Young Pacemaker [id number], RVS lead 2088TC/58 [id number] and RA lead 2088TC/52 [id number]  - F/u ECHO  - f/u Cardio consult    #Acute on chronic macrocytic anemia  - No signs of bleeding, s/p 2 units of PRBC in the ED   - DENISE refused   - Iron Panel, B12, Folate ordered; but patient transfused. Iron panel results to be inconclusive  - f/u tsat% and ferritin  - F/u Hemolysis panel: LDH, Haptoglobin, tot bili  - stool guaiac  - f/u CBC Q 12H,  PRBC transfusion if dropping  - CT abdomen if HGB decrease  - f/u GI consult    #ESRD on HD t/th/s  - Follows Dr. Amin  - Phos 3.6 WNL  - s/p HD today via RUE aneurysmal AVF  - Continue on Phoslo: Calcium Acetate 1 tab with meals  - Nephro consult appreciated: started epogen 20,000U IV QHD, increased to lasix 80mg iv q12h while inpatient  - low K+ renal diet / fluid restriction    #Troponinemia  - Troponin 0.21 which is consistent with baseline  - Troponin elevations secondary to ESRD and inability to clear via kidneys.     #HTN  - Continue on Lasix 80 mg IV BID    #HLD  - Continue on Atorvastatin 20mg in place of Rosuvastatin 5mg daily     #Rheumatic Heart Disease s/p Mitral and Aortic valve replacement  - Aortic Valve: Medtronic -27 Serial: [id number]  - Mitral Valve: Medtronix 310C27 Serial: [id number]  - Peterson Pericardial Heart Valve [id number]    Activity: As tolerated  Diet: DASH/Renal 1.2 L restriction  DVT ppx: Heparin  GI ppx: Protonix  Code Status: Full Code  DISPO: Acute

## 2021-02-14 NOTE — PROGRESS NOTE ADULT - ASSESSMENT
ESRD on HD   - via RUE aneurysmal AVF   - TTS at [facility]  anemia  dyspnea  fluid overload  borderline low BP  afib not on AC due to GIB  HFrEF / rheumatic heart disease s/p AVR and MVR  DM  MARLI on home O2 and bipap  cirrhosis   splenomegaly / ascites  ex-etoh abuse      plan:     next HD Tuesday  s/p 2U PRBC transfusion  PRN PRBC transfusion  f/u tsat% and ferritin  epogen 20,000U IV QHD  lasix 80mg iv q12h while inpatient  GI eval pending / monitor h/h / check stool guaiac   right arm precautions   low K+ renal diet / fluid restriction  phoslo with meals  check echo   ascites mild, so no need for paracentesis  no cardio investigation   full code  will follow

## 2021-02-14 NOTE — PROGRESS NOTE ADULT - SUBJECTIVE AND OBJECTIVE BOX
NEPHROLOGY FOLLOW UP NOTE    HD yesterday  less edema and sob  s/p prbc transfusion  denies bleeding  cardio input noted  bp's on low side       PAST MEDICAL & SURGICAL HISTORY:  MARLI (obstructive sleep apnea)    H/O rheumatic heart disease    ESRD (end stage renal disease) on dialysis    Diabetes mellitus    HTN (hypertension)    AV fistula  right upper arm    H/O cardiac pacemaker    H/O mitral valve replacement    H/O aortic valve replacement      Allergies:  No Known Allergies    Home Medications Reviewed    SOCIAL HISTORY:  Denies ETOH,Smoking,   FAMILY HISTORY:  No pertinent family history in first degree relatives          REVIEW OF SYSTEMS:  CONSTITUTIONAL: No weakness, fevers or chills  EYES/ENT: No visual changes;  No vertigo or throat pain   NECK: No pain or stiffness  RESPIRATORY: No cough, wheezing, hemoptysis; + shortness of breath  CARDIOVASCULAR: No chest pain or palpitations.  GASTROINTESTINAL: No abdominal or epigastric pain. No nausea, vomiting, or hematemesis; No diarrhea or constipation. No melena or hematochezia.  GENITOURINARY: No dysuria, frequency, foamy urine, urinary urgency, incontinence or hematuria  NEUROLOGICAL: No numbness or weakness  SKIN: No itching, burning, rashes, or lesions   VASCULAR: + bilateral lower extremity edema.   All other review of systems is negative unless indicated above.    advance care planning and advance care directives reviewed    PHYSICAL EXAM:  Constitutional: NAD  HEENT: anicteric sclera, oropharynx clear, MMM  Neck: + JVD  Respiratory: few b/l rales  Cardiovascular: S1, S2, RRR  Gastrointestinal: BS+, soft, NT/ND  Extremities: No cyanosis or clubbing. 2+ peripheral edema  Neurological: A/O x 3, no focal deficits  Psychiatric: Normal mood, normal affect  : No CVA tenderness. No wolf.  Skin: No rashes  Vascular Access: right UE AVF    Hospital Medications:   MEDICATIONS  (STANDING):  ALBUTerol    90 MICROgram(s) HFA Inhaler 2 Puff(s) Inhalation every 6 hours  atorvastatin 20 milliGRAM(s) Oral at bedtime  calcium acetate 667 milliGRAM(s) Oral three times a day with meals  chlorhexidine 4% Liquid 1 Application(s) Topical <User Schedule>  epoetin adalberto-epbx (RETACRIT) Injectable 10170 Unit(s) IV Push <User Schedule>  finasteride 5 milliGRAM(s) Oral daily  furosemide   Injectable 80 milliGRAM(s) IV Push two times a day  heparin   Injectable 5000 Unit(s) SubCutaneous every 12 hours  heparin   Injectable. 2000 Unit(s) Dialysis. <User Schedule>  influenza   Vaccine 0.5 milliLiter(s) IntraMuscular once  pantoprazole    Tablet 40 milliGRAM(s) Oral before breakfast  tiotropium 18 MICROgram(s) Capsule 1 Capsule(s) Inhalation daily        VITALS:  T(F): 96.9 (02-14-21 @ 14:08), Max: 98.1 (02-14-21 @ 05:26)  HR: 65 (02-14-21 @ 14:08)  BP: 108/60 (02-14-21 @ 14:08)  RR: 18 (02-14-21 @ 14:08)  SpO2: --  Wt(kg): --    02-13 @ 07:01  -  02-14 @ 07:00  --------------------------------------------------------  IN: 0 mL / OUT: 3000 mL / NET: -3000 mL          LABS:  02-14    132<L>  |  90<L>  |  43<H>  ----------------------------<  88  4.5   |  27  |  5.6<HH>    Ca    8.8      14 Feb 2021 04:30  Phos  3.6     02-13  Mg     1.8     02-14    TPro  6.9  /  Alb  3.7  /  TBili  1.2  /  DBili      /  AST  33  /  ALT  13  /  AlkPhos  444<H>  02-14                          7.7    6.18  )-----------( 157      ( 14 Feb 2021 04:30 )             24.4       Urine Studies:        RADIOLOGY & ADDITIONAL STUDIES:

## 2021-02-14 NOTE — PROGRESS NOTE ADULT - SUBJECTIVE AND OBJECTIVE BOX
MARILYN COOK  71y  Male      Patient is a 71y old  Male who presents with a chief complaint of Anemia and Fluid Overload (13 Feb 2021 11:58)      INTERVAL HPI/OVERNIGHT EVENTS: Feeling better      REVIEW OF SYSTEMS:  CONSTITUTIONAL: No fever, weight loss, or fatigue  EYES: No eye pain, visual disturbances, or discharge  ENMT:  No difficulty hearing, tinnitus, vertigo; No sinus or throat pain  NECK: No pain or stiffness  BREASTS: No pain, masses, or nipple discharge  RESPIRATORY: No cough, wheezing, chills or hemoptysis; [place]  CARDIOVASCULAR: No chest pain, palpitations, dizziness, or leg swelling  GASTROINTESTINAL: No abdominal or epigastric pain. No nausea, vomiting, or hematemesis; No diarrhea or constipation. No melena or hematochezia.  GENITOURINARY: No dysuria, frequency, hematuria, or incontinence  NEUROLOGICAL: No headaches, memory loss, loss of strength, numbness, or tremors  SKIN: No itching, burning, rashes, or lesions   LYMPH NODES: No enlarged glands  ENDOCRINE: No heat or cold intolerance; No hair loss  MUSCULOSKELETAL: No joint pain or swelling; No muscle, back, or extremity pain  PSYCHIATRIC: No depression, anxiety, mood swings, or difficulty sleeping  HEME/LYMPH: No easy bruising, or bleeding gums  ALLERY AND IMMUNOLOGIC: No hives or eczema    T(C): 36.7 (02-14-21 @ 05:26), Max: 36.7 (02-13-21 @ 21:30)  HR: 79 (02-14-21 @ 05:26) (62 - 79)  BP: 91/58 (02-14-21 @ 05:26) (91/58 - 121/55)  RR: 18 (02-14-21 @ 05:26) (18 - 18)  SpO2: --  Wt(kg): --Vital Signs Last 24 Hrs  T(C): 36.7 (14 Feb 2021 05:26), Max: 36.7 (13 Feb 2021 21:30)  T(F): 98.1 (14 Feb 2021 05:26), Max: 98.1 (14 Feb 2021 05:26)  HR: 79 (14 Feb 2021 05:26) (62 - 79)  BP: 91/58 (14 Feb 2021 05:26) (91/58 - 121/55)  BP(mean): --  RR: 18 (14 Feb 2021 05:26) (18 - 18)  SpO2: --    PHYSICAL EXAM:  GENERAL: NAD, well-groomed, well-developed  HEAD:  Atraumatic, Normocephalic  EYES: EOMI, PERRLA, conjunctiva and sclera clear  ENMT: No tonsillar erythema, exudates, or enlargement; Moist mucous membranes, Good dentition, No lesions  NECK: Supple, No JVD, Normal thyroid  NERVOUS SYSTEM:  Alert & Oriented X3, Good concentration; Motor Strength 5/5 B/L upper and lower extremities; DTRs 2+ intact and symmetric  CHEST/LUNG: Clear to percussion bilaterally; No rales, rhonchi, wheezing, or rubs  HEART: Regular rate and rhythm; No murmurs, rubs, or gallops  ABDOMEN: Soft, Nontender, Nondistended; Bowel sounds present  EXTREMITIES:  2+ Peripheral Pulses, No clubbing, cyanosis, or edema. Chronic skin changes lower extremities  LYMPH: No lymphadenopathy noted  SKIN: No rashes or lesions    Consultant(s) Notes Reviewed:  [x ] YES  [ ] NO    Discussed with Consultants/Other Providers [ x] YES     LABS                         7.7    6.18  )-----------( 157      ( 14 Feb 2021 04:30 )             24.4   02-14    132<L>  |  90<L>  |  43<H>  ----------------------------<  88  4.5   |  27  |  5.6<HH>    Ca    8.8      14 Feb 2021 04:30  Phos  3.6     02-13  Mg     1.8     02-14    TPro  6.9  /  Alb  3.7  /  TBili  1.2  /  DBili  x   /  AST  33  /  ALT  13  /  AlkPhos  444<H>  02-14        RADIOLOGY & ADDITIONAL TESTS:    Imaging Personally Reviewed:  [ ] YES  [ ] NO    HEALTH ISSUES - PROBLEM Dx:  MEDICATIONS  (STANDING):  ALBUTerol    90 MICROgram(s) HFA Inhaler 2 Puff(s) Inhalation every 6 hours  atorvastatin 20 milliGRAM(s) Oral at bedtime  calcium acetate 667 milliGRAM(s) Oral three times a day with meals  chlorhexidine 4% Liquid 1 Application(s) Topical <User Schedule>  epoetin adalberto-epbx (RETACRIT) Injectable 73368 Unit(s) IV Push <User Schedule>  finasteride 5 milliGRAM(s) Oral daily  furosemide   Injectable 80 milliGRAM(s) IV Push two times a day  heparin   Injectable 5000 Unit(s) SubCutaneous every 12 hours  heparin   Injectable. 2000 Unit(s) Dialysis. <User Schedule>  influenza   Vaccine 0.5 milliLiter(s) IntraMuscular once  pantoprazole    Tablet 40 milliGRAM(s) Oral before breakfast  tiotropium 18 MICROgram(s) Capsule 1 Capsule(s) Inhalation daily    MEDICATIONS  (PRN):

## 2021-02-14 NOTE — PROGRESS NOTE ADULT - ASSESSMENT
1. Acute exacerbation of chronic CHFrEF: Feeling better, dyspnea improved. Lungs clear. HD as scheduled. Follow up TTE results. IV lasix as ordered (making some urine). Cardiology follow up. EP follow up for AICD interrogation. Disposition/D/C planning per Cardiology  2. Anemia multifactorial, R/O GI blood loss. Transfused 2U PRBCs. GI follow up. Retacrit as ordered. Monitor H/H, maintain Hgb>7  3. Rheumatic heart disease S/P MVR/AVR: Follow up TTE results  4. Alcoholic liver cirrhosis: Stable, at baseline  5. ESRD: HD as scheduled  6. BPH: Finasteride as ordered  7. PT evaluation for ambulation  8. Fall/aspiration/decubitus precautions. GI/DVT prophylaxis

## 2021-02-15 NOTE — PROGRESS NOTE ADULT - ASSESSMENT
This is a 71 year old male with PMHx of HTN, ESRD on HD T/Th/S, A-fib not on A/c due to Upper GI bleed, type 2 DM, MARLI not on C-PAP, Alcoholic Liver Cirrhosis, Rheumatic Heart Disease s/p Aortic and Mitral Valve Replacement who presented to the ED with worsening shortness of breath    #Worsening shortness of breath likely due to symptomatic anemia vs CHF exacerbation vs fluid overload  #History of CHFrEF   - BNP > 70,000. Previous admission on 12/2019: BMP in 60K  - TTE  (12/2019): EF history of <20%; s/p AICD  - increased Lasix 80mg IV BID (still making urine)  - Daily weights  - Strict Is and Os  - 1.2L fluid restriction  - Abdominal US( 2/13/21): Abdominal and pelvic mild ascites, Cirrhotic liver  - St Young Pacemaker [id number], RVS lead 2088TC/58 [id number] and RA lead 2088TC/52 [id number]  -ECHO EF: 53%  - f/u Cardiology    #Acute on chronic macrocytic anemia  - No signs of bleeding, s/p 2 units of PRBC in the ED   - DENISE refused   - Iron Panel, B12, Folate ordered; but patient transfused. Iron panel results to be inconclusive  - f/u tsat% and ferritin  - F/u Hemolysis panel: LDH, Haptoglobin, tot bili  - stool guaiac  - f/u CBC Q 12H,  PRBC transfusion if dropping  - CT abdomen if HGB decrease  - f/u GI consult    #ESRD on HD t/th/s  - Follows Dr. Amin  - Phos 3.6 WNL  - s/p HD today via RUE aneurysmal AVF  - Continue on Phoslo: Calcium Acetate 1 tab with meals  - Nephro consult appreciated: started epogen 20,000U IV QHD, increased to lasix 80mg iv q12h while inpatient  - low K+ renal diet / fluid restriction  - Nephro recommending additional RRT tomorrow    #Troponinemia  - Troponin 0.21 which is consistent with baseline  - Troponin elevations secondary to ESRD and inability to clear via kidneys.     #HTN  - Continue on Lasix 80 mg IV BID    #HLD  - Continue on Atorvastatin 20mg in place of Rosuvastatin 5mg daily     #Rheumatic Heart Disease s/p Mitral and Aortic valve replacement  - Aortic Valve: Medtronic -67 Serial: [id number]  - Mitral Valve: Medtronix 310C27 Serial: [id number]  - Peterson Pericardial Heart Valve [id number]    Activity: As tolerated  Diet: DASH/Renal 1.2 L restriction  DVT ppx: Heparin  GI ppx: Protonix  Code Status: Full Code  DISPO: Acute

## 2021-02-15 NOTE — PROGRESS NOTE ADULT - ASSESSMENT
1. Acute exacerbation of chronic CHFrEF: Compensated. Outpatient Cardiology follow up for ischemic workup. If cleared by Cardiology, may discharge if no further inpatient workup  2. ESRD: HD as scheduled   3. S/P MVR/AVR: Stable, no issues from TTE  4. Alcoholic liver cirrhosis: Stable  5. Anemia (multifactorial): Hgb stable, no further drop. No evidence for GI bleed

## 2021-02-15 NOTE — PROGRESS NOTE ADULT - ASSESSMENT
ESRD on HD    - via RUE AVF   - TTS at [facility]  anemia  dyspnea better  fluid overload  borderline low BP, chronic  afib not on AC due to prior GIB  HFrEF / rheumatic heart disease s/p AVR and MVR  DM  MARLI on home O2 and bipap  cirrhosis   splenomegaly / ascites  ex-etoh abuse      plan:     next HD Tuesday  s/p 2U PRBC transfusion  PRN PRBC transfusion  no iv iron due to high ferritin  epogen 20,000U IV QHD  lasix 80mg iv q12h while inpatient  f/u GI recommendations   right arm precautions   low K+ renal diet / fluid restriction  phoslo with meals  d/w resident

## 2021-02-15 NOTE — PROGRESS NOTE ADULT - SUBJECTIVE AND OBJECTIVE BOX
MARILYN COOK  71y  Male      Patient is a 71y old  Male who presents with a chief complaint of Anemia and Fluid Overload (14 Feb 2021 17:25)      INTERVAL HPI/OVERNIGHT EVENTS: Feels well, no new issues      REVIEW OF SYSTEMS:  CONSTITUTIONAL: No fever, weight loss, or fatigue  EYES: No eye pain, visual disturbances, or discharge  ENMT:  No difficulty hearing, tinnitus, vertigo; No sinus or throat pain  NECK: No pain or stiffness  BREASTS: No pain, masses, or nipple discharge  RESPIRATORY: No cough, wheezing, chills or hemoptysis; MERCADO  CARDIOVASCULAR: No chest pain, palpitations, dizziness, or leg swelling  GASTROINTESTINAL: No abdominal or epigastric pain. No nausea, vomiting, or hematemesis; No diarrhea or constipation. No melena or hematochezia.  GENITOURINARY: No dysuria, frequency, hematuria, or incontinence  NEUROLOGICAL: No headaches, memory loss, loss of strength, numbness, or tremors  SKIN: No itching, burning, rashes, or lesions   LYMPH NODES: No enlarged glands  ENDOCRINE: No heat or cold intolerance; No hair loss  MUSCULOSKELETAL: No joint pain or swelling; No muscle, back, or extremity pain  PSYCHIATRIC: No depression, anxiety, mood swings, or difficulty sleeping  HEME/LYMPH: No easy bruising, or bleeding gums  ALLERY AND IMMUNOLOGIC: No hives or eczema    T(C): 36.7 (02-15-21 @ 05:00), Max: 36.7 (02-15-21 @ 05:00)  HR: 65 (02-15-21 @ 05:00) (65 - 65)  BP: 100/59 (02-15-21 @ 05:00) (100/59 - 108/60)  RR: 18 (02-15-21 @ 05:00) (17 - 18)  SpO2: --  Wt(kg): --Vital Signs Last 24 Hrs  T(C): 36.7 (15 Feb 2021 05:00), Max: 36.7 (15 Feb 2021 05:00)  T(F): 98.1 (15 Feb 2021 05:00), Max: 98.1 (15 Feb 2021 05:00)  HR: 65 (15 Feb 2021 05:00) (65 - 65)  BP: 100/59 (15 Feb 2021 05:00) (100/59 - 108/60)  BP(mean): --  RR: 18 (15 Feb 2021 05:00) (17 - 18)  SpO2: --    PHYSICAL EXAM:  GENERAL: NAD, well-groomed, well-developed  HEAD:  Atraumatic, Normocephalic  EYES: EOMI, PERRLA, conjunctiva and sclera clear  ENMT: No tonsillar erythema, exudates, or enlargement; Moist mucous membranes, Good dentition, No lesions  NECK: Supple, No JVD, Normal thyroid  NERVOUS SYSTEM:  Alert & Oriented X3, Good concentration; Motor Strength 5/5 B/L upper and lower extremities; DTRs 2+ intact and symmetric  CHEST/LUNG: Clear to percussion bilaterally; No rales, rhonchi, wheezing, or rubs  HEART: Regular rate and rhythm; No murmurs, rubs, or gallops  ABDOMEN: Soft, Nontender, Nondistended; Bowel sounds present  EXTREMITIES:  2+ Peripheral Pulses, No clubbing, cyanosis, or edema  LYMPH: No lymphadenopathy noted  SKIN: No rashes or lesions    Consultant(s) Notes Reviewed:  [x ] YES  [ ] NO    Discussed with Consultants/Other Providers [ x] YES     LABS                         7.7    6.03  )-----------( 154      ( 14 Feb 2021 18:34 )             24.7   02-14    132<L>  |  90<L>  |  43<H>  ----------------------------<  88  4.5   |  27  |  5.6<HH>    Ca    8.8      14 Feb 2021 04:30  Mg     1.8     02-14    TPro  6.9  /  Alb  3.7  /  TBili  1.2  /  DBili  x   /  AST  33  /  ALT  13  /  AlkPhos  444<H>  02-14        RADIOLOGY & ADDITIONAL TESTS:    Imaging Personally Reviewed:  [ ] YES  [ ] NO    HEALTH ISSUES - PROBLEM Dx:    MEDICATIONS  (STANDING):  ALBUTerol    90 MICROgram(s) HFA Inhaler 2 Puff(s) Inhalation every 6 hours  atorvastatin 20 milliGRAM(s) Oral at bedtime  calcium acetate 667 milliGRAM(s) Oral three times a day with meals  chlorhexidine 4% Liquid 1 Application(s) Topical <User Schedule>  epoetin adalberto-epbx (RETACRIT) Injectable 65634 Unit(s) IV Push <User Schedule>  finasteride 5 milliGRAM(s) Oral daily  furosemide   Injectable 80 milliGRAM(s) IV Push two times a day  heparin   Injectable 5000 Unit(s) SubCutaneous every 12 hours  heparin   Injectable. 2000 Unit(s) Dialysis. <User Schedule>  influenza   Vaccine 0.5 milliLiter(s) IntraMuscular once  pantoprazole    Tablet 40 milliGRAM(s) Oral before breakfast  tiotropium 18 MICROgram(s) Capsule 1 Capsule(s) Inhalation daily    MEDICATIONS  (PRN):

## 2021-02-15 NOTE — CONSULT NOTE ADULT - ASSESSMENT
post AVR, MVR acceptable function and gradient in the echo  volume overload due to ESRD, and obessity and possible MARLI  no evidence of ACS  responded well to HD, severe anemia    in cardiac point current Rx  HD to keep him euvolemic  out patient sleep study  out patient CAD work up  advised following d/c call dr mujica to be followed in 2-3 weeks  i spoke to his daughter as well
ESRD on HD   - via RUE aneurysmal AVF   - TTS at [facility]  anemia  dyspnea  fluid overload  borderline low BP  afib not on AC due to GIB  HFrEF / rheumatic heart disease s/p AVR and MVR  DM  MARLI on home O2 and bipap  cirrhosis   splenomegaly / ascites  ex-etoh abuse      plan:     HD today via AVF - 3kg off goal as bp tolerates  s/p 2U PRBC transfusion  PRN PRBC transfusion  check tsat% and ferritin  epogen 20,000U IV QHD  lasix 80mg iv q12h while inpatient  GI eval pending / monitor h/h / check stool guaiac   right arm precautions   low K+ renal diet / fluid restriction  phoslo with meals  check echo   ascites mild, so no need for para  d/w hd nursing      
anemia -m  multifactorial in etiology

## 2021-02-15 NOTE — PROGRESS NOTE ADULT - SUBJECTIVE AND OBJECTIVE BOX
SUBJECTIVE:    Patient is a 71y old Male who presents with a chief complaint of Anemia and Fluid Overload (13 Feb 2021 10:43)    Currently admitted to medicine with the primary diagnosis of Anemia.     Today is hospital day 3d. This morning pt is in dialysis. No overnight events.     PAST MEDICAL & SURGICAL HISTORY  MALRI (obstructive sleep apnea)    H/O rheumatic heart disease    ESRD (end stage renal disease) on dialysis    Diabetes mellitus    HTN (hypertension)    AV fistula  right upper arm    H/O cardiac pacemaker    H/O mitral valve replacement    H/O aortic valve replacement    ALLERGIES:  No Known Allergies    MEDICATIONS:  STANDING MEDICATIONS  ALBUTerol    90 MICROgram(s) HFA Inhaler 2 Puff(s) Inhalation every 6 hours  atorvastatin 20 milliGRAM(s) Oral at bedtime  calcium acetate 667 milliGRAM(s) Oral three times a day with meals  chlorhexidine 4% Liquid 1 Application(s) Topical <User Schedule>  epoetin adalberto-epbx (RETACRIT) Injectable 22546 Unit(s) IV Push <User Schedule>  finasteride 5 milliGRAM(s) Oral daily  furosemide   Injectable 80 milliGRAM(s) IV Push two times a day  heparin   Injectable 5000 Unit(s) SubCutaneous every 12 hours  heparin   Injectable. 2000 Unit(s) Dialysis. <User Schedule>  influenza   Vaccine 0.5 milliLiter(s) IntraMuscular once  pantoprazole    Tablet 40 milliGRAM(s) Oral before breakfast  tiotropium 18 MICROgram(s) Capsule 1 Capsule(s) Inhalation daily    PRN MEDICATIONS    VITALS:   ICU Vital Signs Last 24 Hrs  T(C): 36.7 (15 Feb 2021 05:00), Max: 36.7 (15 Feb 2021 05:00)  T(F): 98.1 (15 Feb 2021 05:00), Max: 98.1 (15 Feb 2021 05:00)  HR: 65 (15 Feb 2021 05:00) (65 - 65)  BP: 100/59 (15 Feb 2021 05:00) (100/59 - 101/59)  RR: 18 (15 Feb 2021 05:00) (17 - 18)      LABS:                                     7.7    6.03  )-----------( 154      ( 14 Feb 2021 18:34 )             24.7       02-14    132<L>  |  90<L>  |  43<H>  ----------------------------<  88  4.5   |  27  |  5.6<HH>    Ca    8.8      14 Feb 2021 04:30  Mg     1.8     02-14    TPro  6.9  /  Alb  3.7  /  TBili  1.2  /  DBili  x   /  AST  33  /  ALT  13  /  AlkPhos  444<H>  02-14                      Lactate Trend            CAPILLARY BLOOD GLUCOSE      POCT Blood Glucose.: 115 mg/dL (15 Feb 2021 12:29)          PHYSICAL EXAM:  PHYSICAL EXAM:  GENERAL: NAD, speaks in full sentences, no signs of respiratory distress, seated up in bed  HEAD:  Atraumatic, Normocephalic  EYES: EOMI, PERRLA, anicteric sclera  NECK: + JVD  CHEST/LUNG: Clear to auscultation bilaterally; No wheeze; No crackles; No accessory muscles used  HEART: Regular rate and rhythm; No murmurs;   ABDOMEN: distended; Bowel sounds present; No guarding  EXTREMITIES:  2+ Peripheral Pulses, No cyanosis or edema  PSYCH: AAOx3  NEUROLOGY: non-focal  SKIN: No rashes or lesions

## 2021-02-15 NOTE — CONSULT NOTE ADULT - SUBJECTIVE AND OBJECTIVE BOX
NEPHROLOGY CONSULTATION NOTE    This is a 71 year old male with PMHx of HTN, ESRD on HD T/Th/S, A-fib not on A/c due to Upper GI bleed, type 2 DM, MARLI not on C-PAP, Alcoholic Liver Cirrhosis, Rheumatic Heart Disease s/p Aortic and Mitral Valve Replacement who presented to the ED with worsening shortness of breath. The patient reported that for the past few days he has been having worsening shortness of breath with increased orthopnea and calf edema. He has been compliant with his HD; last dialyzed on Thursday. He had routine blood work done at HD yesterday and his hemoglobin was found to be 6.4. He reports no signs of any bleeding with a DENISE refused in the ED. He has been taking 20mg PO lasix and makes minimal urine. Reported a CT scan recently done as an outpatient with bilateral pleural effusions as well as some abdominal distension.    In the ED initial vitals: /50, HR 65, Sat 98% on room air. Hemoglobin 6.0, BNP >70,0000, Troponin 0.21 ( stable from baseline). Given IV 60mg lasix 3 units of PRBC and admitted to medicine.         PAST MEDICAL & SURGICAL HISTORY:  MARLI (obstructive sleep apnea)    H/O rheumatic heart disease    ESRD (end stage renal disease) on dialysis    Diabetes mellitus    HTN (hypertension)    AV fistula  right upper arm    H/O cardiac pacemaker    H/O mitral valve replacement    H/O aortic valve replacement      Allergies:  No Known Allergies    Home Medications Reviewed    SOCIAL HISTORY:  Denies ETOH,Smoking,   FAMILY HISTORY:  No pertinent family history in first degree relatives          REVIEW OF SYSTEMS:  CONSTITUTIONAL: No weakness, fevers or chills  EYES/ENT: No visual changes;  No vertigo or throat pain   NECK: No pain or stiffness  RESPIRATORY: No cough, wheezing, hemoptysis; + shortness of breath  CARDIOVASCULAR: No chest pain or palpitations.  GASTROINTESTINAL: No abdominal or epigastric pain. No nausea, vomiting, or hematemesis; No diarrhea or constipation. No melena or hematochezia.  GENITOURINARY: No dysuria, frequency, foamy urine, urinary urgency, incontinence or hematuria  NEUROLOGICAL: No numbness or weakness  SKIN: No itching, burning, rashes, or lesions   VASCULAR: + bilateral lower extremity edema.   All other review of systems is negative unless indicated above.    PHYSICAL EXAM:  Constitutional: NAD  HEENT: anicteric sclera, oropharynx clear, MMM  Neck: + JVD  Respiratory: few b/l rales  Cardiovascular: S1, S2, RRR  Gastrointestinal: BS+, soft, NT/ND  Extremities: No cyanosis or clubbing. 2+ peripheral edema  Neurological: A/O x 3, no focal deficits  Psychiatric: Normal mood, normal affect  : No CVA tenderness. No wolf.  Skin: No rashes  Vascular Access: right UE F    Hospital Medications:   MEDICATIONS  (STANDING):  ALBUTerol    90 MICROgram(s) HFA Inhaler 2 Puff(s) Inhalation every 6 hours  atorvastatin 20 milliGRAM(s) Oral at bedtime  calcium acetate 667 milliGRAM(s) Oral three times a day with meals  chlorhexidine 4% Liquid 1 Application(s) Topical <User Schedule>  epoetin adalberto-epbx (RETACRIT) Injectable 92578 Unit(s) IV Push <User Schedule>  finasteride 5 milliGRAM(s) Oral daily  furosemide   Injectable 60 milliGRAM(s) IV Push two times a day  heparin   Injectable 5000 Unit(s) SubCutaneous every 12 hours  heparin   Injectable. 2000 Unit(s) Dialysis. <User Schedule>  influenza   Vaccine 0.5 milliLiter(s) IntraMuscular once  pantoprazole    Tablet 40 milliGRAM(s) Oral before breakfast  tiotropium 18 MICROgram(s) Capsule 1 Capsule(s) Inhalation daily        VITALS:  T(F): 96.7 (02-13-21 @ 04:51), Max: 97 (02-12-21 @ 17:10)  HR: 65 (02-13-21 @ 04:51)  BP: 107/56 (02-13-21 @ 04:51)  RR: 18 (02-13-21 @ 04:51)  SpO2: 97% (02-12-21 @ 21:45)  Wt(kg): --    Height (cm): 182.9 (02-12 @ 15:30)    LABS:  02-13    137  |  93<L>  |  53<H>  ----------------------------<  90  4.0   |  31  |  6.3<HH>    Ca    9.3      13 Feb 2021 07:27  Phos  3.6     02-13  Mg     1.9     02-13    TPro  6.8  /  Alb  3.2<L>  /  TBili  1.1  /  DBili      /  AST  34  /  ALT  11  /  AlkPhos  387<H>  02-13                          6.9    6.46  )-----------( 146      ( 13 Feb 2021 07:27 )             21.2       Urine Studies:        RADIOLOGY & ADDITIONAL STUDIES:  
Patient is a 71y old  Male who presents with a chief complaint of Anemia and Fluid Overload (14 Feb 2021 12:27)      HPI:  Patient of dr mujica presented with leg edema sob, volu,me overloaded, responded well to HD, feels well now, much less dyspnea, just van now, no leg edema, no chest pain,   known patient of AVR, MVR, ( AS, MR)    This is a 71 year old male with PMHx of HTN, ESRD on HD T/Th/S, A-fib not on A/c due to Upper GI bleed, type 2 DM, MARLI not on C-PAP, Alcoholic Liver Cirrhosis, Rheumatic Heart Disease s/p Aortic and Mitral Valve Replacement who presented to the ED with worsening shortness of breath. The patient reported that for the past few days he has been having worsening shortness of breath with increased orthopnea and calf edema. He has been compliant with his HD; last dialyzed on Thursday. He had routine blood work done at HD yesterday and his hemoglobin was found to be 6.4. He reports no signs of any bleeding with a DENISE refused in the ED. He has been taking 20mg PO lasix and makes minimal urine. Reported a CT scan recently done as an outpatient with bilateral pleural effusions as well as some abdominal distension.    In the ED initial vitals: /50, HR 65, Sat 98% on room air. Hemoglobin 6.0, BNP >70,0000, Troponin 0.21 ( stable from baseline). Given IV 60mg lasix 3 units of PRBC and admitted to medicine.  (12 Feb 2021 20:51)      PAST MEDICAL & SURGICAL HISTORY:  MARLI (obstructive sleep apnea)    H/O rheumatic heart disease    ESRD (end stage renal disease) on dialysis    Diabetes mellitus    HTN (hypertension)    AV fistula  right upper arm    H/O cardiac pacemaker    H/O mitral valve replacement    H/O aortic valve replacement        PREVIOUS DIAGNOSTIC TESTING:      ECHO  FINDINGS: < from: TTE Echo Complete w/o Contrast w/ Doppler (02.14.21 @ 09:33) >   1. Normal global left ventricular systolic function.   2. Left atrial enlargement.   3. LV Ejection Fraction by Otero's Method with a biplane EF of 53 %.   4. Normal left ventricular internal cavity size.   5. Normal right atrial size.   6. Mitral prosthesis stenosis.   7. Peak transmitral mean gradient equals 8.5 mmHg, calculated mitral valve area by pressure half time equals 1.41 cm² consistent with moderate mitral stenosis.   8. Moderate tricuspid regurgitation.   9. Abnormal stenosis of the aortic prosthesis.  10. Bioprosthesis in the aortic position.  11. Mild pulmonic valve regurgitation.  12. Peak transaortic gradient equals 45.9 mmHg, mean transaortic gradient equals 27.2 mmHg, the calculated aortic valve area equals 1.01 cm² by the continuity equation consistent with moderate aortic stenosis.    < end of copied text >          MEDICATIONS  (STANDING):  ALBUTerol    90 MICROgram(s) HFA Inhaler 2 Puff(s) Inhalation every 6 hours  atorvastatin 20 milliGRAM(s) Oral at bedtime  calcium acetate 667 milliGRAM(s) Oral three times a day with meals  chlorhexidine 4% Liquid 1 Application(s) Topical <User Schedule>  epoetin adalberto-epbx (RETACRIT) Injectable 26964 Unit(s) IV Push <User Schedule>  finasteride 5 milliGRAM(s) Oral daily  furosemide   Injectable 80 milliGRAM(s) IV Push two times a day  heparin   Injectable 5000 Unit(s) SubCutaneous every 12 hours  heparin   Injectable. 2000 Unit(s) Dialysis. <User Schedule>  influenza   Vaccine 0.5 milliLiter(s) IntraMuscular once  pantoprazole    Tablet 40 milliGRAM(s) Oral before breakfast  tiotropium 18 MICROgram(s) Capsule 1 Capsule(s) Inhalation daily    MEDICATIONS  (PRN):      FAMILY HISTORY:  No pertinent family history in first degree relatives        SOCIAL HISTORY:  CIGARETTES: no  ALCOHOL: no  DRUGS: no                      REVIEW OF SYSTEMS:  CONSTITUTIONAL: No distress, Looks stable  NECK: No pain or stiffnes  RESPIRATORY: No cough, wheezing, shortness of breath  CARDIOVASCULAR: No chest pain, SOB, palpitations, leg swelling  GASTROINTESTINAL: No abdominal or epigastric pain. No nausea, vomiting, or hematemesis;  No melena.  NEUROLOGICAL: No dizziness, headaches, memory loss, loss of strength  SKIN: No itching, burning, rashes, or lesions   ENDOCRINE: No heat or cold intolerance  MUSCULOSKELETAL: No joint pain, No  swelling; No muscle pain  PSYCHIATRIC: No depression, anxiety, mood swings, or difficulty sleeping  ALLERGY: No hives, itching, rash          Vital Signs Last 24 Hrs  T(C): 36.7 (14 Feb 2021 05:26), Max: 36.7 (13 Feb 2021 21:30)  T(F): 98.1 (14 Feb 2021 05:26), Max: 98.1 (14 Feb 2021 05:26)  HR: 79 (14 Feb 2021 05:26) (62 - 79)  BP: 91/58 (14 Feb 2021 05:26) (91/58 - 121/55)  BP(mean): --  RR: 18 (14 Feb 2021 05:26) (18 - 18)  SpO2: --                      PHYSICAL EXAM:  GENERAL: No distress, well developed  HEAD:  Atraumatic, Normocephalic  NECK: Supple, No JVD, No Bruit of either carotid arteries  NERVOUS SYSTEM:  Alert, Awake, Oriented to time, place, person; Normal memory and speech; Normal motor Strength 5/5 B/L upper and lower extremities  CHEST/LUNG: Normal air entry to lung base bilaterally; No wheeze, crackle, rales, rhonchi  HEART: Regular heart beat, S1, A2, P2, No S3, No S4, No gallop, No murmur  ABDOMEN: Soft, Non tender, Non distended; Bowel sounds present  EXTREMITIES:  2+ Peripheral Pulses, No clubbing, No edema  SKIN: No rashes or lesions      ECG: < from: 12 Lead ECG (02.12.21 @ 16:30) >   Ventricular pacing  Right bundle branch block  Anteroseptal infarct , age undetermined  T wave abnormality, consider inferior ischemia  Abnormal ECG    < end of copied text >  IT IS A PM RHYTHM, CAN NOT READ ANTERIOR MI IS THE PATTERN OF PM    I&O's Detail    13 Feb 2021 07:01  -  14 Feb 2021 07:00  --------------------------------------------------------  IN:  Total IN: 0 mL    OUT:    Other (mL): 3000 mL  Total OUT: 3000 mL    Total NET: -3000 mL          LABS:                        7.7    6.18  )-----------( 157      ( 14 Feb 2021 04:30 )             24.4     02-14    132<L>  |  90<L>  |  43<H>  ----------------------------<  88  4.5   |  27  |  5.6<HH>    Ca    8.8      14 Feb 2021 04:30  Phos  3.6     02-13  Mg     1.8     02-14    TPro  6.9  /  Alb  3.7  /  TBili  1.2  /  DBili  x   /  AST  33  /  ALT  13  /  AlkPhos  444<H>  02-14    CARDIAC MARKERS ( 12 Feb 2021 16:14 )  x     / 0.21 ng/mL / x     / x     / x              I&O's Summary    13 Feb 2021 07:01  -  14 Feb 2021 07:00  --------------------------------------------------------  IN: 0 mL / OUT: 3000 mL / NET: -3000 mL        RADIOLOGY & ADDITIONAL STUDIES: < from: Xray Chest 1 View- PORTABLE-Urgent (Xray Chest 1 View- PORTABLE-Urgent .) (02.12.21 @ 17:46) >  No radiographic evidence of acute cardiopulmonary disease. Stable cardiomegaly    < end of copied text >   XRAY I SAW: CARDIOMEGALY, SMALL LEFT AND RIGHT PLEURAL EFFUSION
  Chief Complaint: Patient is a 71y old  Male who presents with a chief complaint of Anemia and Fluid Overload (15 Feb 2021 08:23)      HPI:  Pt admitted because of anemia -  has multiple etiologies of anemia includeing renal failure , cirhossis ( ALCOHOL), Chronic - pt is on erythropoietin in the past and fluid overload.  He e denies hematemesis, melenal hematochezia abd is on  HD 3x/ week    Medications:  ALBUTerol    90 MICROgram(s) HFA Inhaler 2 Puff(s) Inhalation every 6 hours  atorvastatin 20 milliGRAM(s) Oral at bedtime  calcium acetate 667 milliGRAM(s) Oral three times a day with meals  chlorhexidine 4% Liquid 1 Application(s) Topical <User Schedule>  epoetin adalberto-epbx (RETACRIT) Injectable 00382 Unit(s) IV Push <User Schedule>  finasteride 5 milliGRAM(s) Oral daily  furosemide   Injectable 80 milliGRAM(s) IV Push two times a day  heparin   Injectable 5000 Unit(s) SubCutaneous every 12 hours  heparin   Injectable. 2000 Unit(s) Dialysis. <User Schedule>  influenza   Vaccine 0.5 milliLiter(s) IntraMuscular once  pantoprazole    Tablet 40 milliGRAM(s) Oral before breakfast  tiotropium 18 MICROgram(s) Capsule 1 Capsule(s) Inhalation daily      PMHX/PSHX:  MARLI (obstructive sleep apnea)    H/O rheumatic heart disease    ESRD (end stage renal disease) on dialysis    Diabetes mellitus    HTN (hypertension)    AV fistula    H/O cardiac pacemaker    H/O mitral valve replacement    H/O aortic valve replacement        Family history:  No pertinent family history in first degree relatives        Social History:     Allergies:  No Known Allergies        Review of Systems:  General:  No wt loss, fevers, chills, night sweats, fatigue or pruritis.  Eyes:  Good vision, no reported pain or redness.  ENT:  No sore throat, pain, runny nose, or difficulty swallowing  CV:  No pain, palpitations, hypo/hypertension  Resp:  No dyspnea, cough, tachypnea, wheezing  GI:  No pain, nausea, vomiting, dysphagia, heartburn, diarrhea, constipation, or weight loss. , No rectal bleeding, tarry stools, or hematemesis.  :  No pain, bleeding/discharges, incontinence, nocturia  Musculoskeletal:  No pain, weakness or fasciculations.  Neuro:  No weakness, tingling, memory problems or paresthesias  Psych:  No fatigue, insomnia, mood problems, depression  Endocrine:  No polyuria, polydipsia, cold/heat intolerance  Heme:  No petechiae, ecchymosis, easy bruisability  Skin:  No rash, pruritis, tattoos, scars, or edema      PHYSICAL EXAM:   Vital Signs:  Vital Signs Last 24 Hrs  T(C): 36.7 (15 Feb 2021 05:00), Max: 36.7 (15 Feb 2021 05:00)  T(F): 98.1 (15 Feb 2021 05:00), Max: 98.1 (15 Feb 2021 05:00)  HR: 65 (15 Feb 2021 05:00) (65 - 65)  BP: 100/59 (15 Feb 2021 05:00) (100/59 - 108/60)  BP(mean): --  RR: 18 (15 Feb 2021 05:00) (17 - 18)  SpO2: --  Daily     Daily Weight in k (15 Feb 2021 10:37)    T(C): 36.7 (02-15-21 @ 05:00), Max: 36.7 (02-15-21 @ 05:00)  HR: 65 (02-15-21 @ 05:00) (65 - 65)  BP: 100/59 (02-15-21 @ 05:00) (100/59 - 108/60)  RR: 18 (02-15-21 @ 05:00) (17 - 18)  SpO2: --    GENERAL:  Appears stated age, well-groomed, well-nourished, no distress  HEENT:  Conjunctivae clear and pink, no thyromegaly, nodules, adenopathy, no JVD, sclera -anicteric  CHEST:  Full & symmetric excursion, no increased effort, breath sounds clear  HEART:  Regular rhythm, S1, S2, no murmur/rub/S3/S4, no abdominal bruit, no edema  ABDOMEN:  Soft, non-tender, non-distended, normoactive bowel sounds,  no masses ,no hepato-splenomegaly, no signs of chronic liver disease  EXTEREMITIES:  no cyanosis,clubbing or edema  SKIN:  No rash/erythema/ecchymoses/petechiae/wounds/abscess/warm/dry  NEURO:  Alert, oriented, no asterixis, no tremor, no encephalopathy    LABS:                        7.7    6.03  )-----------( 154      ( 2021 18:34 )  MCV 90's             24.7     -    132<L>  |  90<L>  |  43<H>  ----------------------------<  88  4.5   |  27  |  5.6<HH>    Ca    8.8      2021 04:30  Mg     1.8         TPro  6.9  /  Alb  3.7  /  TBili  1.2  /  DBili  x   /  AST  33  /  ALT  13  /  AlkPhos  444<H>      LIVER FUNCTIONS - ( 2021 04:30 )  Alb: 3.7 g/dL / Pro: 6.9 g/dL / ALK PHOS: 444 U/L / ALT: 13 U/L / AST: 33 U/L / GGT: x                   Imaging:      Assessment and Plan:  Multifactorial etiologies for anemia (see above)  r/o GI bleeding (doubtful)          REC:  Stools for occult blood daily x3  check old records a s this seems to be chronic and likely has been workrd up in the past  Workup can be don as an outpatient              1

## 2021-02-15 NOTE — PROGRESS NOTE ADULT - SUBJECTIVE AND OBJECTIVE BOX
NEPHROLOGY FOLLOW UP NOTE    pt seen and examined  chronically low BP's  less edema  SOB better  last HD Sat      PAST MEDICAL & SURGICAL HISTORY:  MARLI (obstructive sleep apnea)    H/O rheumatic heart disease    ESRD (end stage renal disease) on dialysis    Diabetes mellitus    HTN (hypertension)    AV fistula  right upper arm    H/O cardiac pacemaker    H/O mitral valve replacement    H/O aortic valve replacement      Allergies:  No Known Allergies    Home Medications Reviewed    SOCIAL HISTORY:  Denies ETOH,Smoking,   FAMILY HISTORY:  No pertinent family history in first degree relatives          REVIEW OF SYSTEMS:  CONSTITUTIONAL: No weakness, fevers or chills  EYES/ENT: No visual changes;  No vertigo or throat pain   NECK: No pain or stiffness  RESPIRATORY: No cough, wheezing, hemoptysis; + shortness of breath  CARDIOVASCULAR: No chest pain or palpitations.  GASTROINTESTINAL: No abdominal or epigastric pain. No nausea, vomiting, or hematemesis; No diarrhea or constipation. No melena or hematochezia.  GENITOURINARY: No dysuria, frequency, foamy urine, urinary urgency, incontinence or hematuria  NEUROLOGICAL: No numbness or weakness  SKIN: No itching, burning, rashes, or lesions   VASCULAR: + bilateral lower extremity edema.   All other review of systems is negative unless indicated above.      PHYSICAL EXAM:  Constitutional: NAD  HEENT: anicteric sclera, oropharynx clear, MMM  Neck: + JVD  Respiratory: few b/l rales  Cardiovascular: S1, S2, RRR  Gastrointestinal: BS+, soft, NT/ND  Extremities: No cyanosis or clubbing. 2+ peripheral edema  Neurological: A/O x 3, no focal deficits  Psychiatric: Normal mood, normal affect  : No CVA tenderness. No wolf.  Skin: No rashes  Vascular Access: right UE AVF    Hospital Medications:   MEDICATIONS  (STANDING):  ALBUTerol    90 MICROgram(s) HFA Inhaler 2 Puff(s) Inhalation every 6 hours  atorvastatin 20 milliGRAM(s) Oral at bedtime  calcium acetate 667 milliGRAM(s) Oral three times a day with meals  chlorhexidine 4% Liquid 1 Application(s) Topical <User Schedule>  epoetin adalberto-epbx (RETACRIT) Injectable 64193 Unit(s) IV Push <User Schedule>  finasteride 5 milliGRAM(s) Oral daily  furosemide   Injectable 80 milliGRAM(s) IV Push two times a day  heparin   Injectable 5000 Unit(s) SubCutaneous every 12 hours  heparin   Injectable. 2000 Unit(s) Dialysis. <User Schedule>  influenza   Vaccine 0.5 milliLiter(s) IntraMuscular once  pantoprazole    Tablet 40 milliGRAM(s) Oral before breakfast  tiotropium 18 MICROgram(s) Capsule 1 Capsule(s) Inhalation daily        VITALS:  T(F): 96.3 (02-15-21 @ 14:51), Max: 98.1 (02-15-21 @ 05:00)  HR: 65 (02-15-21 @ 14:51)  BP: 105/54 (02-15-21 @ 14:51)  RR: 20 (02-15-21 @ 14:51)  SpO2: --  Wt(kg): --    02-13 @ 07:01  -  02-14 @ 07:00  --------------------------------------------------------  IN: 0 mL / OUT: 3000 mL / NET: -3000 mL    02-15 @ 07:01  -  02-15 @ 15:51  --------------------------------------------------------  IN: 0 mL / OUT: 200 mL / NET: -200 mL          LABS:  02-14    132<L>  |  90<L>  |  43<H>  ----------------------------<  88  4.5   |  27  |  5.6<HH>    Ca    8.8      14 Feb 2021 04:30  Mg     1.8     02-14    TPro  6.9  /  Alb  3.7  /  TBili  1.2  /  DBili      /  AST  33  /  ALT  13  /  AlkPhos  444<H>  02-14                          7.7    6.03  )-----------( 154      ( 14 Feb 2021 18:34 )             24.7       Urine Studies:        RADIOLOGY & ADDITIONAL STUDIES:

## 2021-02-16 NOTE — DISCHARGE NOTE PROVIDER - NSDCMRMEDTOKEN_GEN_ALL_CORE_FT
calcium acetate 667 mg oral capsule: 1 cap(s) orally 3 times a day (with meals)  Combivent Respimat 20 mcg-100 mcg/inh inhalation aerosol: 1 puff(s) inhaled 4 times a day  finasteride 5 mg oral tablet: 1 tab(s) orally once a day  furosemide 20 mg oral tablet: 1 tab(s) orally once a day  pantoprazole 40 mg oral delayed release tablet: 1 tab(s) orally once a day  rosuvastatin 5 mg oral tablet: 1 tab(s) orally once a day   calcium acetate 667 mg oral capsule: 1 cap(s) orally 3 times a day (with meals)  Combivent Respimat 20 mcg-100 mcg/inh inhalation aerosol: 1 puff(s) inhaled 4 times a day  finasteride 5 mg oral tablet: 1 tab(s) orally once a day  furosemide 20 mg oral tablet: 4 tab(s) orally 2 times a day  pantoprazole 40 mg oral delayed release tablet: 1 tab(s) orally once a day  rosuvastatin 5 mg oral tablet: 1 tab(s) orally once a day

## 2021-02-16 NOTE — DISCHARGE NOTE NURSING/CASE MANAGEMENT/SOCIAL WORK - PATIENT PORTAL LINK FT
You can access the FollowMyHealth Patient Portal offered by [facility] by registering at the following website: [email or web address]. By joining [employer]’s FollowMyHealth portal, you will also be able to view your health information using other applications (apps) compatible with our system.

## 2021-02-16 NOTE — PROGRESS NOTE ADULT - ASSESSMENT
ESRD on HD    - via RUE AVF   - TTS at [facility]  anemia  dyspnea better  fluid overload  borderline low BP, chronic  afib not on AC due to prior GIB  HFrEF / rheumatic heart disease s/p AVR and MVR  DM  MARLI on home O2 and bipap  cirrhosis   splenomegaly / ascites  ex-etoh abuse      plan:     HD today  epogen 20,000U IV QHD  change to lasix 80mg po bid  f/u GI   low K+ renal diet / fluid restriction  phoslo with meals  d/w resident    addendum:  pt seen again on HD.  EDema better. 2l off.  stable for dc home from renal perspective.   outpt higher dose epo.  check cbc in 1-2 weeks.

## 2021-02-16 NOTE — PROGRESS NOTE ADULT - PROVIDER SPECIALTY LIST ADULT
Nephrology
Nephrology
Internal Medicine
Internal Medicine
Nephrology
Internal Medicine

## 2021-02-16 NOTE — PROGRESS NOTE ADULT - ASSESSMENT
1. Acute exacerbation of CHFrEF: Compensated, at baseline. No further inpatient workup per cardiology. Stable for D/C home on usual medication. Outpatient Cardiology follow up. Follow up in office in 1-2 weeks time [phone]  2. ESRD: HD as scheduled.  3. Anemia multifactorial: No evidence for acute bleeding. Outpatient GI follow up. Retacrit as ordered  4. Alcoholic liver cirrhosis: At baseline  5. S/P AVR/MVR: Stable. TTE valves functioning normally

## 2021-02-16 NOTE — DISCHARGE NOTE PROVIDER - NSDCCPCAREPLAN_GEN_ALL_CORE_FT
PRINCIPAL DISCHARGE DIAGNOSIS  Diagnosis: Fluid overload  Assessment and Plan of Treatment: You presented to the hospital in a fluid overloaded state. This was likely secondary to your chronic heart failure as well as your kidney disease. You were treated with IV medications and dialysis treatments in hospital. Please take your medications as prescribed and follow-up OP.      SECONDARY DISCHARGE DIAGNOSES  Diagnosis: Anemia  Assessment and Plan of Treatment: You presented to the hospital with low hemoglobin levels. Although you have a history of anemia, you required blood transfusion during your hospital course. It is important that your follow-up with your gastroenterologist Dr Finn outpatient for workup.

## 2021-02-16 NOTE — DISCHARGE NOTE PROVIDER - PROVIDER TOKENS
PROVIDER:[TOKEN:[[id number]]],PROVIDER:[TOKEN:[[id number]]],PROVIDER:[TOKEN:[[id number]]]

## 2021-02-16 NOTE — PROGRESS NOTE ADULT - SUBJECTIVE AND OBJECTIVE BOX
MARILYN COOK  71y  Male      Patient is a 71y old  Male who presents with a chief complaint of Anemia and Fluid Overload (16 Feb 2021 11:45)      INTERVAL HPI/OVERNIGHT EVENTS: Feeling much better      REVIEW OF SYSTEMS:  CONSTITUTIONAL: No fever, weight loss, or fatigue  EYES: No eye pain, visual disturbances, or discharge  ENMT:  No difficulty hearing, tinnitus, vertigo; No sinus or throat pain  NECK: No pain or stiffness  BREASTS: No pain, masses, or nipple discharge  RESPIRATORY: No cough, wheezing, chills or hemoptysis; MERCADO (at baseline)  CARDIOVASCULAR: No chest pain, palpitations, dizziness, or leg swelling  GASTROINTESTINAL: No abdominal or epigastric pain. No nausea, vomiting, or hematemesis; No diarrhea or constipation. No melena or hematochezia.  GENITOURINARY: No dysuria, frequency, hematuria, or incontinence  NEUROLOGICAL: No headaches, memory loss, loss of strength, numbness, or tremors  SKIN: No itching, burning, rashes, or lesions   LYMPH NODES: No enlarged glands  ENDOCRINE: No heat or cold intolerance; No hair loss  MUSCULOSKELETAL: No joint pain or swelling; No muscle, back, or extremity pain  PSYCHIATRIC: No depression, anxiety, mood swings, or difficulty sleeping  HEME/LYMPH: No easy bruising, or bleeding gums  ALLERY AND IMMUNOLOGIC: No hives or eczema    T(C): 36.1 (02-16-21 @ 04:50), Max: 36.6 (02-15-21 @ 20:48)  HR: 65 (02-16-21 @ 11:00) (65 - 65)  BP: 102/54 (02-16-21 @ 11:00) (100/55 - 106/59)  RR: 18 (02-16-21 @ 08:00) (18 - 20)  SpO2: --  Wt(kg): --Vital Signs Last 24 Hrs  T(C): 36.1 (16 Feb 2021 04:50), Max: 36.6 (15 Feb 2021 20:48)  T(F): 96.9 (16 Feb 2021 04:50), Max: 97.9 (15 Feb 2021 20:48)  HR: 65 (16 Feb 2021 11:00) (65 - 65)  BP: 102/54 (16 Feb 2021 11:00) (100/55 - 106/59)  BP(mean): --  RR: 18 (16 Feb 2021 08:00) (18 - 20)  SpO2: --    PHYSICAL EXAM:  GENERAL: NAD, well-groomed, well-developed  HEAD:  Atraumatic, Normocephalic  EYES: EOMI, PERRLA, conjunctiva and sclera clear  ENMT: No tonsillar erythema, exudates, or enlargement; Moist mucous membranes, Good dentition, No lesions  NECK: Supple, No JVD, Normal thyroid  NERVOUS SYSTEM:  Alert & Oriented X3, Good concentration; Motor Strength 5/5 B/L upper and lower extremities; DTRs 2+ intact and symmetric  CHEST/LUNG: Clear to percussion bilaterally; No rales, rhonchi, wheezing, or rubs  HEART: Regular rate and rhythm; No murmurs, rubs, or gallops  ABDOMEN: Soft, Nontender, Nondistended; Bowel sounds present  EXTREMITIES:  2+ Peripheral Pulses, No clubbing, cyanosis, chronic venous stasis  LYMPH: No lymphadenopathy noted  SKIN: No rashes or lesions    Consultant(s) Notes Reviewed:  [x ] YES  [ ] NO    Discussed with Consultants/Other Providers [ x] YES     LABS                           7.5    6.30  )-----------( 163      ( 16 Feb 2021 06:08 )             23.1   02-16    129<L>  |  89<L>  |  75<HH>  ----------------------------<  83  5.0   |  23  |  7.7<HH>    Ca    8.5      16 Feb 2021 06:08  Mg     1.9     02-16    TPro  7.1  /  Alb  3.5  /  TBili  1.1  /  DBili  x   /  AST  39  /  ALT  16  /  AlkPhos  462<H>  02-16      RADIOLOGY & ADDITIONAL TESTS:    Imaging Personally Reviewed:  [ ] YES  [ ] NO    HEALTH ISSUES - PROBLEM Dx:  MEDICATIONS  (STANDING):  ALBUTerol    90 MICROgram(s) HFA Inhaler 2 Puff(s) Inhalation every 6 hours  atorvastatin 20 milliGRAM(s) Oral at bedtime  calcium acetate 667 milliGRAM(s) Oral three times a day with meals  chlorhexidine 4% Liquid 1 Application(s) Topical <User Schedule>  epoetin adalberto-epbx (RETACRIT) Injectable 69185 Unit(s) IV Push <User Schedule>  finasteride 5 milliGRAM(s) Oral daily  furosemide   Injectable 80 milliGRAM(s) IV Push two times a day  heparin   Injectable 5000 Unit(s) SubCutaneous every 12 hours  heparin   Injectable. 2000 Unit(s) Dialysis. <User Schedule>  influenza   Vaccine 0.5 milliLiter(s) IntraMuscular once  pantoprazole    Tablet 40 milliGRAM(s) Oral before breakfast  tiotropium 18 MICROgram(s) Capsule 1 Capsule(s) Inhalation daily    MEDICATIONS  (PRN):    MEDICATIONS  (STANDING):  ALBUTerol    90 MICROgram(s) HFA Inhaler 2 Puff(s) Inhalation every 6 hours  atorvastatin 20 milliGRAM(s) Oral at bedtime  calcium acetate 667 milliGRAM(s) Oral three times a day with meals  chlorhexidine 4% Liquid 1 Application(s) Topical <User Schedule>  epoetin adalberto-epbx (RETACRIT) Injectable 17027 Unit(s) IV Push <User Schedule>  finasteride 5 milliGRAM(s) Oral daily  furosemide   Injectable 80 milliGRAM(s) IV Push two times a day  heparin   Injectable 5000 Unit(s) SubCutaneous every 12 hours  heparin   Injectable. 2000 Unit(s) Dialysis. <User Schedule>  influenza   Vaccine 0.5 milliLiter(s) IntraMuscular once  pantoprazole    Tablet 40 milliGRAM(s) Oral before breakfast  tiotropium 18 MICROgram(s) Capsule 1 Capsule(s) Inhalation daily    MEDICATIONS  (PRN):

## 2021-02-16 NOTE — DISCHARGE NOTE PROVIDER - HOSPITAL COURSE
71 year old male with PMHx of HTN, ESRD on HD T/Th/S, A-fib not on A/c due to Upper GI bleed, type 2 DM, MARLI not on C-PAP, Alcoholic Liver Cirrhosis, Rheumatic Heart Disease s/p Aortic and Mitral Valve Replacement who presented to the ED with worsening shortness of breath. The patient reported that for the past few days he has been having worsening shortness of breath with increased orthopnea and calf edema. He has been compliant with his HD; last dialyzed on Thursday. He had routine blood work done at HD yesterday and his hemoglobin was found to be 6.4. He reports no signs of any bleeding with a DENISE refused in the ED. He has been taking 20mg PO lasix and makes minimal urine. Reported a CT scan recently done as an outpatient with bilateral pleural effusions as well as some abdominal distension.    In the ED initial vitals: /50, HR 65, Sat 98% on room air. Hemoglobin 6.0, BNP >70,0000, Troponin 0.21 ( stable from baseline). Given IV 60mg lasix 3 units of PRBC and admitted to medicine.

## 2021-02-16 NOTE — DISCHARGE NOTE PROVIDER - CARE PROVIDER_API CALL
Phil Haley  INTERNAL MEDICINE  [address] [address]  Phone: [phone]  Fax: [phone]  Follow Up Time:     Luis Angel Baca  INTERNAL MEDICINE  [address] [place] [address]  Phone: [phone]  Fax: [phone]  Follow Up Time:     Vineet Finn)  Gastroenterology; Internal Medicine  [address] [place] [address]  Phone: [phone]  Fax: [phone]  Follow Up Time:

## 2021-02-16 NOTE — PROGRESS NOTE ADULT - SUBJECTIVE AND OBJECTIVE BOX
NEPHROLOGY FOLLOW UP NOTE    pt seen on HD  no sob        PAST MEDICAL & SURGICAL HISTORY:  MARLI (obstructive sleep apnea)    H/O rheumatic heart disease    ESRD (end stage renal disease) on dialysis    Diabetes mellitus    HTN (hypertension)    AV fistula  right upper arm    H/O cardiac pacemaker    H/O mitral valve replacement    H/O aortic valve replacement      Allergies:  No Known Allergies    Home Medications Reviewed    SOCIAL HISTORY:  Denies ETOH,Smoking,   FAMILY HISTORY:  No pertinent family history in first degree relatives          REVIEW OF SYSTEMS:  CONSTITUTIONAL: No weakness, fevers or chills  EYES/ENT: No visual changes;  No vertigo or throat pain   NECK: No pain or stiffness  RESPIRATORY: No cough, wheezing, hemoptysis; + shortness of breath  CARDIOVASCULAR: No chest pain or palpitations.  GASTROINTESTINAL: No abdominal or epigastric pain. No nausea, vomiting, or hematemesis; No diarrhea or constipation. No melena or hematochezia.  GENITOURINARY: No dysuria, frequency, foamy urine, urinary urgency, incontinence or hematuria  NEUROLOGICAL: No numbness or weakness  SKIN: No itching, burning, rashes, or lesions   VASCULAR: + bilateral lower extremity edema.   All other review of systems is negative unless indicated above.      PHYSICAL EXAM:  Constitutional: NAD  HEENT: anicteric sclera, oropharynx clear, MMM  Neck: + JVD  Respiratory: few b/l rales  Cardiovascular: S1, S2, RRR  Gastrointestinal: BS+, soft, NT/ND  Extremities: No cyanosis or clubbing. 2+ peripheral edema  Neurological: A/O x 3, no focal deficits  Psychiatric: Normal mood, normal affect  : No CVA tenderness. No wolf.  Skin: No rashes  Vascular Access: right UE AVF    Hospital Medications:   MEDICATIONS  (STANDING):  ALBUTerol    90 MICROgram(s) HFA Inhaler 2 Puff(s) Inhalation every 6 hours  atorvastatin 20 milliGRAM(s) Oral at bedtime  calcium acetate 667 milliGRAM(s) Oral three times a day with meals  chlorhexidine 4% Liquid 1 Application(s) Topical <User Schedule>  epoetin adalberto-epbx (RETACRIT) Injectable 37498 Unit(s) IV Push <User Schedule>  finasteride 5 milliGRAM(s) Oral daily  furosemide   Injectable 80 milliGRAM(s) IV Push two times a day  heparin   Injectable 5000 Unit(s) SubCutaneous every 12 hours  heparin   Injectable. 2000 Unit(s) Dialysis. <User Schedule>  influenza   Vaccine 0.5 milliLiter(s) IntraMuscular once  pantoprazole    Tablet 40 milliGRAM(s) Oral before breakfast  tiotropium 18 MICROgram(s) Capsule 1 Capsule(s) Inhalation daily        VITALS:  T(F): 96.9 (02-16-21 @ 04:50), Max: 97.9 (02-15-21 @ 20:48)  HR: 65 (02-16-21 @ 11:00)  BP: 102/54 (02-16-21 @ 11:00)  RR: 18 (02-16-21 @ 08:00)  SpO2: --  Wt(kg): --    02-15 @ 07:01  -  02-16 @ 07:00  --------------------------------------------------------  IN: 0 mL / OUT: 550 mL / NET: -550 mL    02-16 @ 07:01  -  02-16 @ 14:37  --------------------------------------------------------  IN: 0 mL / OUT: 2000 mL / NET: -2000 mL          LABS:  02-16    129<L>  |  89<L>  |  75<HH>  ----------------------------<  83  5.0   |  23  |  7.7<HH>    Ca    8.5      16 Feb 2021 06:08  Mg     1.9     02-16    TPro  7.1  /  Alb  3.5  /  TBili  1.1  /  DBili      /  AST  39  /  ALT  16  /  AlkPhos  462<H>  02-16                          7.5    6.30  )-----------( 163      ( 16 Feb 2021 06:08 )             23.1       Urine Studies:        RADIOLOGY & ADDITIONAL STUDIES:

## 2021-02-19 PROBLEM — Z86.79 PERSONAL HISTORY OF OTHER DISEASES OF THE CIRCULATORY SYSTEM: Chronic | Status: ACTIVE | Noted: 2021-01-01

## 2021-02-19 PROBLEM — G47.33 OBSTRUCTIVE SLEEP APNEA (ADULT) (PEDIATRIC): Chronic | Status: ACTIVE | Noted: 2021-01-01

## 2021-03-09 PROBLEM — K70.30 ALCOHOLIC CIRRHOSIS OF LIVER: Status: ACTIVE | Noted: 2020-01-14

## 2021-03-09 PROBLEM — N18.6 ESRD (END STAGE RENAL DISEASE): Status: ACTIVE | Noted: 2021-01-01

## 2021-03-09 PROBLEM — D62 ANEMIA DUE TO ACUTE BLOOD LOSS: Status: ACTIVE | Noted: 2021-01-01

## 2021-03-09 PROBLEM — I48.91 ATRIAL FIBRILLATION, UNSPECIFIED TYPE: Status: ACTIVE | Noted: 2020-01-14

## 2021-03-09 NOTE — HISTORY OF PRESENT ILLNESS
[Home] : at home, [unfilled] , at the time of the visit. [Medical Office: ([facility])___] : at the medical office located in  [FreeTextEntry3] : [facility] care proxy [FreeTextEntry4] : Martine Aguilar [de-identified] : This is a 71 year old male who presents for evaluation and treatment of cirrhosis , Renal failure, anemia. The patient has numerous medical problems, and has Afib, not on anticoagulation due to bleeding, PPM, s/p Mitral v replacement, Ao valve replacement with stenosis in both valves.\par He was recently admitted for anemia and seen by Dr Finn, and discharged without egd and colonoscopy (stool pos for occult blood).

## 2021-03-15 NOTE — ED PROVIDER NOTE - NS ED ROS FT
Review of Systems    Constitutional: (-) fever   Eyes/ENT: (-) vision changes  Cardiovascular: (-) chest pain, (-) syncope (-) palpitations  Respiratory: (-) cough, (-) shortness of breath  Gastrointestinal: (-) vomiting, (-) diarrhea (-)black/bloody stools (-) abdominal pain  Genitourinary:  (-) dysuria   Musculoskeletal: (-) neck pain, (-) back pain, (-) leg pain/swelling  Integumentary: (-) rash, (-) edema  Neurological: (-) headache,  (-) confusion  Hematologic: (-) easy bruising   Psychiatric: (-) hallucinations  Allergic/Immunologic: (-) pruritus

## 2021-03-15 NOTE — ED PROVIDER NOTE - CLINICAL SUMMARY MEDICAL DECISION MAKING FREE TEXT BOX
71 y/o M with hx of ESRD on dialysis Tuesday, Thursday, and Saturday with last dialysis on Saturday, Afib not on anti-coagulation due to hx of a GI bleed, DM, MARLI, rheumatic heart disease, aortic and mitral valve replacement, and alcohol liver cirrhosis. Pt presents to ED for a hemoglobin of 6.4. Pt reports feeling lightheaded with intermittent episodes of SOB. No f/c, cough, CP, n/v/d, abdominal pain, black or bloody stools. VS reviewed. Labs ekg obtained and reviewed. Patient found to have Hb 5.9 consent obtained blood products ordered. Patient placed in obs for transfusion

## 2021-03-15 NOTE — ED PROVIDER NOTE - PROGRESS NOTE DETAILS
Attending Note: 69 y/o M with hx of ESRD on dialysis Tuesday, Thursday, and Saturday with last dialysis on Saturday, Afib not on anti-coagulation due to hx of a GI bleed, DM, MARLI, rheumatic heart disease, aortic and mitral valve replacement, and alcohol liver cirrhosis. Pt presents to ED for a hemoglobin of 6.4. Pt reports feeling lightheaded with intermittent episodes of SOB. No f/c, cough, CP, n/v/d, abdominal pain, black or bloody stools.  PE: CONSTITUTIONAL: WA / WN / NAD. HEAD: NCAT. EYES: PERRL; EOMI; anicteric. ENT: Normal pharynx; mucous membranes pink/moist, no erythema. NECK: Supple; no meningeal signs CARD: RRR; nl S1/S2; no M/R/G. Pulses equal bilaterally. RESP: Respiratory rate and effort are normal; breath sounds clear and equal bilaterally. ABD: Soft, NT ND nl bowel sounds; no masses; no rebound. MSK/EXT: (+) right arm fistula, no gross deformities; full range of motion. SKIN: Warm and dry;  NEURO: AAOx3, PSYCH: Memory Intact, Normal Affect.

## 2021-03-15 NOTE — ED PROVIDER NOTE - OBJECTIVE STATEMENT
72 y/o M with PMH HTN, anemia, ESRD on HD T/Th/S (R arm fistula), A-fib not on AC due to Upper GI bleed, type 2 DM, MARLI not on C-PAP, Alcoholic Liver Cirrhosis, Rheumatic Heart Disease s/p Aortic and Mitral Valve Replacement, recently here with anemia and transfusion of 3U, still makes urine (relates he urinates a little bit, but every hr) presents with mild constant lightheadedness x 1 wk. has labwork done 3 days ago which indicated low hemoglobin level so sent in for transfusion. has not missed any dialysis. no palliating/provoking factors.  no loc/HA/fever/n/v/bleeding from rectum/urine/mouth/coughing blood/epistaxis/gingiva or any other source.   no abd pain/cp/sob.

## 2021-03-15 NOTE — ED PROVIDER NOTE - PHYSICAL EXAMINATION
PHYSICAL EXAM:    GENERAL: Alert, appears stated age, well appearing, non-toxic  SKIN: Warm, pink and dry.   HEAD: NC  EYE: Normal lids/conjunctiva  ENT: Normal hearing, patent oropharynx   NECK: +supple. No meningismus, or JVD  Pulm: Bilateral BS, normal resp effort, no wheezes, stridor, or retractions. speaking in compelte sentences.   CV: RRR, no M/R/G, 2+and = radial pulses  Abd: soft, non-tender, no rebound/guarding.   Mskel: no erythema, cyanosis, edema. no calf tenderness  Neuro: AAOx3, no sensory/motor deficits, No speech slurring, pronator drift, facial asymmetry. normal finger-to-nose b/l. 5/5 strength throughout.

## 2021-03-16 NOTE — ED CDU PROVIDER INITIAL DAY NOTE - PROGRESS NOTE DETAILS
CTAB, feels much improved post transfusion. Reviewed all results and necessity for follow up. Counseled on red flags and to return for them.  Patient appears well on discharge.

## 2021-03-16 NOTE — ED CDU PROVIDER DISPOSITION NOTE - CARE PROVIDER_API CALL
Bryant Rojo)  Internal Medicine; Medical Oncology  [address] [place] [address]  Phone: [phone]  Fax: [phone]  Follow Up Time: 1-3 Days

## 2021-03-16 NOTE — ED CDU PROVIDER DISPOSITION NOTE - NSFOLLOWUPINSTRUCTIONS_ED_ALL_ED_FT
Blood Transfusion, Care After  ImageThis sheet gives you information about how to care for yourself after your procedure. Your doctor may also give you more specific instructions. If you have problems or questions, contact your doctor.    Follow these instructions at home:  Take over-the-counter and prescription medicines only as told by your doctor.  Go back to your normal activities as told by your doctor.  Follow instructions from your doctor about how to take care of the area where an IV tube was put into your vein (insertion site). Make sure you:    Wash your hands with soap and water before you change your bandage (dressing). If there is no soap and water, use hand .  Change your bandage as told by your doctor.    Check your IV insertion site every day for signs of infection. Check for:    More redness, swelling, or pain.  More fluid or blood.  Warmth.  Pus or a bad smell.    Contact a doctor if:  You have more redness, swelling, or pain around the IV insertion site..  You have more fluid or blood coming from the IV insertion site.  Your IV insertion site feels warm to the touch.  You have pus or a bad smell coming from the IV insertion site.  Your pee (urine) turns pink, red, or brown.  You feel weak after doing your normal activities.  Get help right away if:  You have signs of a serious allergic or body defense (immune) system reaction, including:    Itchiness.  Hives.  Trouble breathing.  Anxiety.  Pain in your chest or lower back.  Fever, flushing, and chills.  Fast pulse.  Rash.  Watery poop (diarrhea).  Throwing up (vomiting).  Dark pee.  Serious headache.  Dizziness.  Stiff neck.  Yellow color in your face or the white parts of your eyes (jaundice).    Summary  After a blood transfusion, return to your normal activities as told by your doctor.  Every day, check for signs of infection where the IV tube was put into your vein.  Some signs of infection are warm skin, more redness and pain, more fluid or blood, and pus or a bad smell where the needle went in.  Contact your doctor if you feel weak or have any unusual symptoms.  This information is not intended to replace advice given to you by your health care provider. Make sure you discuss any questions you have with your health care provider.

## 2021-03-16 NOTE — ED CDU PROVIDER DISPOSITION NOTE - CLINICAL COURSE
EDOU placement for blood transfusion; vss pre/post-transfusion, return precautions discussed, has HD [facility]

## 2021-03-16 NOTE — ED CDU PROVIDER DISPOSITION NOTE - PATIENT PORTAL LINK FT
You can access the FollowMyHealth Patient Portal offered by [facility] by registering at the following website: [email or web address]. By joining [employer]’s FollowMyHealth portal, you will also be able to view your health information using other applications (apps) compatible with our system.

## 2021-03-16 NOTE — ED CDU PROVIDER INITIAL DAY NOTE - PHYSICAL EXAMINATION
PHYSICAL EXAM:    GENERAL: Alert, appears stated age, well appearing, non-toxic  SKIN: Warm, pink and dry.   HEAD: NC  EYE: Normal lids/conjunctiva  ENT: Normal hearing, patent oropharynx   NECK: +supple. No meningismus, or JVD  Pulm: Bilateral BS, normal resp effort, no wheezes, stridor, or retractions  CV: RRR, no M/R/G, 2+and = radial pulses  Abd: soft, non-tender  Mskel: no erythema, cyanosis, edema. no calf tenderness  Neuro: AAOx3, No speech slurring, pronator drift, facial asymmetry. normal finger-to-nose b/l. 5/5 strength throughout

## 2021-03-26 NOTE — ED PROVIDER NOTE - PROGRESS NOTE DETAILS
TC: 72 yo M with recurrent anemia TC: 72 yo M with PMHx of DM, ESRD on HD (Tues/Th/Sat), rheumatic heart disease, HTN, MARLI, anemia who was sent in for low hgb on routine labs, had had multiple transfusions in past. No active GIB. In ED, hemodynamically stable. Given lasix before transfusion. Ordered labs, cxr. Blood transfusion consent form signed and placed in scanner. TC: Hgb 6.2 here. Ordered 2u pRBC. Will place in obs for transfusion.

## 2021-03-26 NOTE — ED PROVIDER NOTE - ATTENDING CONTRIBUTION TO CARE
I personally evaluated the patient. I reviewed the Resident’s or Physician Assistant’s note (as assigned above), and agree with the findings and plan except as documented in my note.    Pt is a 72 y/o male with hx of ESRD on dialysis, hx of GIB, cirrhosis with varices, presents for weakness, sent to ED for low hemoglobin. No black or bloody stool. Labs mild, constant. No chest pain, fever.    Constitutional: Well developed, well nourished. NAD.  Head: Normocephalic, atraumatic.  Eyes: PERRL. EOMI.  ENT: No nasal discharge. Mucous membranes moist.  Neck: Supple. Painless ROM.  Cardiovascular: Normal S1, S2. Regular rate and rhythm. No murmurs, rubs, or gallops.  Pulmonary: Normal respiratory rate and effort. Bibasilar rales.  Abdominal: Soft. Distended. Nontender. No rebound, guarding, rigidity.  Extremities. Pelvis stable. No lower extremity edema, symmetric calves.  Skin: No rashes, cyanosis.  Neuro: AAOx3. No focal neurological deficits.  Psych: Normal mood. Normal affect.

## 2021-03-26 NOTE — ED PROVIDER NOTE - OBJECTIVE STATEMENT
70 yo M with PMHx of DM, ESRD on HD (Tues/Th/Sat), rheumatic heart disease, HTN, MARLI, anemia who was sent in for hgb 5.9 on routine labs drawn for HD on 3/15. Pt has chronic sob and weakness which worsened today. No fever, cp, hematochezia, melena. Oliguric, on lasix 20mg at home. Hx been tranfused 3x in past. Last colonoscopy 2015, pending EGD approval and heme/onc approval for recurrent anemia. No longer on AC due to hx of GIB.     GI: Dr. Lamas  Nephro: Dr. Amin 72 yo M with PMHx of DM, ESRD on HD (Tues/Th/Sat), rheumatic heart disease, HTN, MARLI, anemia who was sent in for hgb 5.9 on routine labs drawn for HD on 3/15. Pt has chronic sob and weakness which worsened today. No fever, cp, hematochezia, melena. Oliguric, on lasix 20mg at home. Hx been transfused 3x in past. Last colonoscopy 2015. Awaiting EGD and repeat colonoscopy pending cardio clearance. Has never seen heme/onc. No longer on AC due to hx of GIB.     GI: Dr. Lamas  Nephro: Dr. Amin  Cardio: Dr. Garcia 70 yo M with PMHx of DM, ESRD on HD (Tues/Th/Sat), rheumatic heart disease, HTN, MARLI, anemia who was sent in for hgb 5.9 on routine labs drawn for HD on 3/15. Pt has chronic sob and weakness which worsened today. No fever, cp, hematochezia, melena. Oliguric, on lasix 20mg at home. Hx been transfused 3x in past. Last colonoscopy 2015. Awaiting EGD and repeat colonoscopy pending cardio clearance. Has never seen heme/onc. No longer on AC due to hx of GIB. Per chart review, pt was seen by GI on 2/21 during last admission and they recommended outpt workup.    GI: Dr. Lamas  Nephro: Dr. Amin  Cardio: Dr. Garcia

## 2021-03-26 NOTE — ED PROVIDER NOTE - PMH
Anemia    Diabetes mellitus    ESRD (end stage renal disease) on dialysis    GIB (gastrointestinal bleeding)    H/O rheumatic heart disease    HTN (hypertension)    MARLI (obstructive sleep apnea)

## 2021-03-26 NOTE — ED PROVIDER NOTE - NS ED ROS FT
GEN:  no fever, no chills, + generalized weakness  NEURO:  no headache, no dizziness  ENT: no sore throat, no runny nose  CV:  no chest pain, no palpitations  RESP:  + sob, no cough  GI:  no nausea, no vomiting, no abdominal pain, no diarrhea  :  no hematuria  MSK:  no joint pain, no edema  SKIN:  no rash, no bruising  HEME: no hematochezia, no melena

## 2021-03-26 NOTE — ED CDU PROVIDER INITIAL DAY NOTE - MEDICAL DECISION MAKING DETAILS
Pt is a 70 y/o male with hx of ESRD on dialysis, hx of GIB, cirrhosis with varices, presents for weakness, sent to ED for low hemoglobin. No black or bloody stool. Labs mild, constant. No chest pain, fever. ED workup showed low Hgb. PT consented for PRBC and blood ordered. Placed in obs.

## 2021-03-26 NOTE — ED PROVIDER NOTE - CLINICAL SUMMARY MEDICAL DECISION MAKING FREE TEXT BOX
PT presented to ED with recurrant anemia. LAbs, imaging obtained. Pt consented for PRBC. Will place in obs. PT has outpt followup with GI, nephro. Will also need dialysis tomorrow.

## 2021-03-26 NOTE — ED CDU PROVIDER INITIAL DAY NOTE - ATTENDING CONTRIBUTION TO CARE
Pt is a 72 y/o male with hx of ESRD on dialysis, hx of GIB, cirrhosis with varices, presents for weakness, sent to ED for low hemoglobin. No black or bloody stool. Labs mild, constant. No chest pain, fever. ED workup showed low Hgb. PT consented for PRBC and blood ordered. Placed in obs.    Constitutional: Well developed, well nourished. NAD.  Head: Normocephalic, atraumatic.  Eyes: PERRL. EOMI.  ENT: No nasal discharge. Mucous membranes moist.  Neck: Supple. Painless ROM.  Cardiovascular: Normal S1, S2. Regular rate and rhythm. No murmurs, rubs, or gallops.  Pulmonary: Normal respiratory rate and effort. Bibasilar rales.  Abdominal: Soft. Distended. Nontender. No rebound, guarding, rigidity.  Extremities. Pelvis stable. No lower extremity edema, symmetric calves.  Skin: No rashes, cyanosis.  Neuro: AAOx3. No focal neurological deficits.  Psych: Normal mood. Normal affect.

## 2021-03-26 NOTE — ED CDU PROVIDER INITIAL DAY NOTE - OBJECTIVE STATEMENT
72 yo male with pmh of DM, ESRD on HD (Tues/Th/Sat), rheumatic heart disease, HTN, MARLI, anemia who was sent in for hgb 5.9 on routine labs drawn for HD on 3/15. Pt has chronic sob and weakness which worsened today. No fever, cp, hematochezia, melena. Oliguric, on lasix 20mg at home. Hx been transfused 3x in past. Last colonoscopy 2015. Awaiting EGD and repeat colonoscopy pending cardio clearance. Has never seen heme/onc. No longer on AC due to hx of GIB. Per chart review, pt was seen by GI on 2/21 during last admission and they recommended outpt workup.

## 2021-03-26 NOTE — ED ADULT NURSE REASSESSMENT NOTE - NS ED NURSE REASSESS COMMENT FT1
Pt reassessed A/o times 4 VS retaken stable S/O 1 unit PRBC is given did tolerated well ,pt denies any sign of allergic reaction ,no pain no N/V comfort provide safety precaution on progress on going nursing observation .

## 2021-03-26 NOTE — ED PROVIDER NOTE - CARE PROVIDER_API CALL
Alla Weber  INTERNAL MEDICINE  [address] [place] [address]  Phone: [phone]  Fax: [phone]  Follow Up Time:     Ronni Max  HEMATOLOGY  [address]  [place] [address]  Phone: [phone]  Fax: [phone]  Follow Up Time:     Maria L Andrade  HEMATOLOGY  [address] [place] [address]  Phone: [phone]  Fax: [phone]  Follow Up Time:     Swapna Dominique)  Hematology; Internal Medicine; Medical Oncology  [address] [place] [address]  Phone: [phone]  Fax: [phone]  Follow Up Time:

## 2021-03-26 NOTE — ED PROVIDER NOTE - PROVIDER TOKENS
PROVIDER:[TOKEN:[[id number]]],PROVIDER:[TOKEN:[[id number]]],PROVIDER:[TOKEN:[[id number]]],PROVIDER:[TOKEN:[[id number]]]

## 2021-03-26 NOTE — ED PROVIDER NOTE - PHYSICAL EXAMINATION
CONSTITUTIONAL: well developed, nontoxic appearing, in no acute distress, speaking in full sentences  SKIN: warm, dry, no rash, cap refill < 2 seconds  HEENT: normocephalic, atraumatic, no conjunctival erythema, moist mucous membranes, patent airway  NECK: supple  CV:  regular rate, regular rhythm  RESP: no wheezes, bibasilar crackles, no rhonchi, normal work of breathing  ABD: soft, nontender, nondistended, no rebound, no guarding  MSK: normal ROM, no cyanosis, no edema  NEURO: alert, oriented, grossly unremarkable  PSYCH: cooperative, appropriate

## 2021-03-27 NOTE — ED CDU PROVIDER SUBSEQUENT DAY NOTE - PHYSICAL EXAMINATION
Gen: NAD, AOx3  Head: NCAT  HEENT: PERRL, oral mucosa moist, normal conjunctiva, oropharynx clear without exudate or erythema  Lung: CTAB, no respiratory distress, no wheezing, rales, rhonchi  CV: normal s1/s2, rrr, Normal perfusion, pulses 2+ throughout  Abd: soft, NTND, no CVA tenderness  Genitourinary: no pelvic tenderness  MSK: no visible deformities, full range of motion in all 4 extremities  Neuro: No focal neurologic deficits  Skin: No rash   Psych: normal affect

## 2021-03-27 NOTE — ED CDU PROVIDER SUBSEQUENT DAY NOTE - MEDICAL DECISION MAKING DETAILS
Pt is a 72 y/o male with hx of ESRD on dialysis, hx of GIB, cirrhosis with varices, presents for weakness, sent to ED for low hemoglobin. No black or bloody stool. Labs mild, constant. No chest pain, fever. ED workup showed low Hgb. PT consented for PRBC and blood ordered. Placed in obs.

## 2021-03-27 NOTE — ED ADULT NURSE REASSESSMENT NOTE - NS ED NURSE REASSESS COMMENT FT1
Report given to Dennise SANTORO . PAtient blood transfusion in progress . Denied any  reactions at this time

## 2021-03-27 NOTE — ED CDU PROVIDER DISPOSITION NOTE - NSFOLLOWUPINSTRUCTIONS_ED_ALL_ED_FT
Please follow up with your primary care physician within 24-72 hours and return immediately if symptoms worsen.    Anemia    Anemia is a condition in which the concentration of red blood cells or hemoglobin in the blood is below normal. Hemoglobin is a substance in red blood cells that carries oxygen to the tissues of the body. Anemia results in not enough oxygen reaching these tissues which can cause symptoms such as weakness, dizziness/lightheadedness, shortness of breath, chest pain, paleness, or nausea.    SEEK IMMEDIATE MEDICAL CARE IF YOU HAVE THE FOLLOWING SYMPTOMS: extreme weakness/chest pain/shortness of breath, black or bloody stools, vomiting blood, fainting, fever, or any signs of dehydration.

## 2021-03-27 NOTE — ED ADULT NURSE REASSESSMENT NOTE - NS ED NURSE REASSESS COMMENT FT1
pt daughter at bedside, pt wheeled in wc to waiting room safely. d/c papers provided and reviewed with family. IV taken out by night shift RN.

## 2021-03-27 NOTE — ED CDU PROVIDER SUBSEQUENT DAY NOTE - ATTENDING CONTRIBUTION TO CARE
Pt is a 70 y/o male with hx of ESRD on dialysis, hx of GIB, cirrhosis with varices, presents for weakness, sent to ED for low hemoglobin. No black or bloody stool. Labs mild, constant. No chest pain, fever. ED workup showed low Hgb. PT consented for PRBC and blood ordered. Placed in obs.    Constitutional: Well developed, well nourished. NAD.  Head: Normocephalic, atraumatic.  Eyes: PERRL. EOMI.  ENT: No nasal discharge. Mucous membranes moist.  Neck: Supple. Painless ROM.  Cardiovascular: Normal S1, S2. Regular rate and rhythm. No murmurs, rubs, or gallops.  Pulmonary: Normal respiratory rate and effort. Bibasilar rales.  Abdominal: Soft. Distended. Nontender. No rebound, guarding, rigidity.  Extremities. Pelvis stable. No lower extremity edema, symmetric calves.  Skin: No rashes, cyanosis.  Neuro: AAOx3. No focal neurological deficits.  Psych: Normal mood. Normal affect.

## 2021-03-27 NOTE — ED CDU PROVIDER SUBSEQUENT DAY NOTE - HISTORY
70 yo male with a pmh of HTN, DM, ESRD on HD Tues/Thurs/Sat presents c/o SOB/weakness that worsened today. pt hgb 6.2 and currently receiving 3 units of prbc. pt comfortable and states SOB has subsided.

## 2021-03-27 NOTE — ED CDU PROVIDER DISPOSITION NOTE - PATIENT PORTAL LINK FT
You can access the FollowMyHealth Patient Portal offered by [facility] by registering at the following website: [email or web address]. By joining [employer]’s FollowMyHealth portal, you will also be able to view your health information using other applications (apps) compatible with our system. You can access the FollowMyHealth Patient Portal offered by [facility] by registering at the following website: [email or web address]. By joining [employer]’s FollowMyHealth portal, you will also be able to view your health information using other applications (apps) compatible with our system.

## 2021-03-27 NOTE — ED CDU PROVIDER SUBSEQUENT DAY NOTE - PROGRESS NOTE DETAILS
pt sitting comfortably in bed. received 2 units of prbc and lasix 20mgx2 post each unit. pt states symptoms have subsided including SOB. pt discharged and has awaiting transport for prolonged period. will now have HD done prior to pt going home to avoid delayed/missed dialysis. contacted pt's daughter at [phone] who states she was able to move her father's dialysis appointment to later in the day. pt will be discharged and received HD at primary site.

## 2021-04-26 NOTE — ED CDU PROVIDER INITIAL DAY NOTE - OBJECTIVE STATEMENT
72 y/o M with PMH HTN, anemia, ESRD on HD T/Th/S (R arm fistula), A-fib not on AC due to Upper GI bleed, type 2 DM, MARLI not on C-PAP, Alcoholic Liver Cirrhosis, Rheumatic Heart Disease s/p Aortic and Mitral Valve Replacement, recently here with anemia and transfusion of 3U, still makes urine (relates he urinates a little bit, but every hr) presents with mild constant lightheadedness x 1 wk. has labwork done 3 days ago which indicated low hemoglobin level so sent in for transfusion. has not missed any dialysis. no palliating/provoking factors.  no loc/HA/fever/n/v/bleeding from rectum/urine/mouth/coughing blood/epistaxis/gingiva or any other source.   no abd pain/cp/sob.
Attending Only

## 2021-04-30 PROBLEM — D64.9 ANEMIA, UNSPECIFIED: Chronic | Status: ACTIVE | Noted: 2021-01-01

## 2021-04-30 PROBLEM — K92.2 GASTROINTESTINAL HEMORRHAGE, UNSPECIFIED: Chronic | Status: ACTIVE | Noted: 2021-01-01

## 2021-04-30 NOTE — ED CDU PROVIDER DISPOSITION NOTE - CARE PROVIDER_API CALL
Joaquin Amin  INTERNAL MEDICINE  [address] [place] [address]  Phone: [phone]  Fax: [phone]  Established Patient  Follow Up Time: 1-3 Days    Jeff Lamas)  Gastroenterology; Internal Medicine  [address] [place] [address]  Phone: [phone]  Fax: [phone]  Established Patient  Follow Up Time: 1-3 Days

## 2021-04-30 NOTE — ED CDU PROVIDER DISPOSITION NOTE - NSFOLLOWUPINSTRUCTIONS_ED_ALL_ED_FT
Blood Transfusion    WHAT YOU NEED TO KNOW:    A blood transfusion is used to give you blood through an IV. You may get only part of the blood, such as red blood cells, platelets, or plasma. The blood may be from you and stored for you to use later. The blood may instead be from another person. Donated blood is tested for HIV, hepatitis, syphilis, West Nile virus, and other diseases.    DISCHARGE INSTRUCTIONS:    Call 911 for any of the following:     You have a skin rash, hives, swelling, or itching.       You have trouble breathing, shortness of breath, wheezing, or coughing.      Your throat tightens or your lips or tongue swell.      You have difficulty swallowing or speaking.    Seek care immediately if:     You develop a high fever and chills.       You are dizzy, lightheaded, confused, or feel like you are going to faint.      You have nausea, diarrhea, or abdominal cramps, or you are vomiting.      You urinate little or not at all.      You develop headaches or double vision.      Your skin or the whites of your eyes look yellow.      You see pinpoint purple spots or purple patches on your body.       You have a seizure.     Contact your healthcare provider if:     You feel tired and weak within 10 days of your transfusion.      You have questions or concerns about blood transfusions.    Medicines:     Antihistamines may help stop mild itching or a rash.      Epinephrine is emergency medicine used to stop anaphylaxis. You may be given epinephrine if you are at risk for anaphylaxis. Your healthcare provider will teach you how to use it.      Take your medicine as directed. Contact your healthcare provider if you think your medicine is not helping or if you have side effects. Tell him or her if you are allergic to any medicine. Keep a list of the medicines, vitamins, and herbs you take. Include the amounts, and when and why you take them. Bring the list or the pill bottles to follow-up visits. Carry your medicine list with you in case of an emergency.    Apply ice to decrease pain and swelling. Use an ice pack, or put ice in a plastic bag and wrap a towel around it. Apply the ice pack or wrapped bag to your transfusion site for 20 minutes each hour or as directed.     Follow up with your healthcare provider as directed: Write down your questions so you remember to ask them during your visits.       © Copyright [employer] 2019 All illustrations and images included in CareNotes are the copyrighted property of [employer] or [employer].

## 2021-04-30 NOTE — ED CDU PROVIDER DISPOSITION NOTE - PATIENT PORTAL LINK FT
You can access the FollowMyHealth Patient Portal offered by [facility] by registering at the following website: [email or web address]. By joining [employer]’s FollowMyHealth portal, you will also be able to view your health information using other applications (apps) compatible with our system.

## 2021-04-30 NOTE — ED CDU PROVIDER INITIAL DAY NOTE - OBJECTIVE STATEMENT
72 y/o male with PMHx of ESRD on HD (T-Th-Sat), Anemia, GIB, MARLI on Home O2 @ 3L, HTN. Presenting under transfusion protocol. Pt had labs drawn yesterday (4/29), at HD, Hgb at that time was 6.1. In the ED Hgb 6.2. Pt will receive 1 unit PRBC then dialyzed on Saturday. Pt has history of transfusion in the past. Nephrologist - Dr Amin. Pt currently has no complaints.

## 2021-04-30 NOTE — ED CDU PROVIDER DISPOSITION NOTE - CARE PROVIDERS DIRECT ADDRESSES
,DirectAddress_Unknown,[email or web address]

## 2021-04-30 NOTE — ED PROVIDER NOTE - ATTENDING CONTRIBUTION TO CARE
70 yo m with PMH of DM, ESRD (T,Th,S), HTN presents to ED for anemia. Patient was in dialysis yesterday and was told he had a HGB of 6.1 and needed to come to the ED for blood. Pt has had anemia in the past requiring blood transfusions. He has not had any SOB, CP, nausea, vomiting. Patient still makes urine. No bright red blood from rectum or black stool.      Const: obese  Eyes: PERRL, no conjunctival injection. pale conjunctiva  HENT:  Neck supple without meningismus   CV: RRR, Warm, well-perfused extremities  RESP: CTA B/L, no tachypnea   GI: soft, non-tender, non-distended. Brown stool   MSK: No gross deformities appreciated  Skin: Warm, dry. No rashes. R AV fistula   Neuro: Alert, CNs II-XII grossly intact. Sensation and motor function of extremities grossly intact.  Psych: Appropriate mood and affect.    Concern for anemia. Will check Hbg and transfuse as needed.

## 2021-04-30 NOTE — ED CDU PROVIDER INITIAL DAY NOTE - PROGRESS NOTE DETAILS
received signout from [place] Noyola - pt in obs; pt currently receiveing blood; daughter at bed side - pt to have Hd at 5am tomorrow and also follow up with Dr. Lamas for endoscopy; pt and family aware of plan to d/c home after tx;

## 2021-04-30 NOTE — ED PROVIDER NOTE - OBJECTIVE STATEMENT
71y M pmh anemia, t2d, ESRD on HD tues, thurs, saturday, HTN, MARLI on 3L O2 at home presenting with low hemoglobin as told at dialysis yesterday. Was told Hgb was 6.1. No f/c/n/v. No chest pain/SOB. No melena, hematochezia, hematuria. Oliguric, on lasix 20mg at home. Hx been transfused 3x in past. Last colonoscopy 2015. Awaiting EGD and repeat colonoscopy pending cardio clearance. Has never seen heme/onc. No longer on AC due to hx of GIB. Per chart review, pt was seen by GI on 2/21 during last admission and they recommended outpt workup.    	GI: Dr. Lamas  	Nephro: Dr. Amin  Cardio: Dr. Garcia

## 2021-04-30 NOTE — ED CDU PROVIDER DISPOSITION NOTE - PROVIDER TOKENS
PROVIDER:[TOKEN:[[id number]],FOLLOWUP:[1-3 Days],ESTABLISHEDPATIENT:[T]],PROVIDER:[TOKEN:[[id number]],FOLLOWUP:[1-3 Days],ESTABLISHEDPATIENT:[T]]

## 2021-04-30 NOTE — ED ADULT NURSE REASSESSMENT NOTE - NS ED NURSE REASSESS COMMENT FT1
pt received alert and oriented, daughter at bedside. blood transfusion in progress. will continue to monitor.

## 2021-04-30 NOTE — ED PROVIDER NOTE - CLINICAL SUMMARY MEDICAL DECISION MAKING FREE TEXT BOX
72 yo M presented to ED for anemia. Pt was found to have hbg of 6.2 and consent for blood was completed and blood ordered. Patient placed in observation for further monitoring and blood administration. Acute GI bleed considered but pt has brown stool and HBG stable from yesterday.

## 2021-06-03 NOTE — CHART NOTE - NSCHARTNOTEFT_GEN_A_CORE
PACU ANESTHESIA ADMISSION NOTE      Procedure:   Post op diagnosis:      ____  Intubated  TV:______       Rate: ______      FiO2: ______    __x__  Patent Airway    ___x_  Full return of protective reflexes    __x__  Full recovery from anesthesia / back to baseline     Vitals:   T:           R: 16                 BP:  95/58                Sat:  100                 P: 65      Mental Status:  x____ Awake  x _____ Alert   _____ Drowsy   _____ Sedated    Nausea/Vomiting:  ____ NO  ______Yes,   See Post - Op Orders          Pain Scale (0-10):  _____    Treatment: ____ None    ____ See Post - Op/PCA Orders    Post - Operative Fluids:   ____ Oral   ____ See Post - Op Orders    Plan: Discharge:   x____Home       _____Floor     _____Critical Care    _____  Other:_________________    Comments:

## 2021-06-03 NOTE — H&P PST ADULT - NSICDXPASTMEDICALHX_GEN_ALL_CORE_FT
PAST MEDICAL HISTORY:  Anemia     Diabetes mellitus     ESRD (end stage renal disease) on dialysis     GIB (gastrointestinal bleeding)     H/O rheumatic heart disease     HTN (hypertension)     MARLI (obstructive sleep apnea)

## 2021-06-03 NOTE — ASU PATIENT PROFILE, ADULT - HISTORY OF COVID-19 VACCINATION
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   
Left message for patient with his test results. Advised him that he is a carrier for hemochromatosis, and that a prescription for zinc will be sent to the [place] pharmacy on [facility]. Asked patient to call back with questions.   
Yes

## 2021-06-03 NOTE — ASU DISCHARGE PLAN (ADULT/PEDIATRIC) - CARE PROVIDER_API CALL
Jeff Lamas  Gastroenterology  [address] [place] [address]  Phone: [phone]  Fax: [phone]  Follow Up Time: 1 month

## 2021-06-03 NOTE — ASU DISCHARGE PLAN (ADULT/PEDIATRIC) - B. DRINK ALCOHOL, BEER, OR WINE
Anemia and Fluid Overload Anemia and Fluid Overload Anemia and Fluid Overload Anemia and Fluid Overload Anemia and Fluid Overload Anemia and Fluid Overload Anemia and Fluid Overload Anemia and Fluid Overload Statement Selected

## 2021-06-19 NOTE — ED ADULT NURSE REASSESSMENT NOTE - NS ED NURSE REASSESS COMMENT FT1
Assumed care from previous nurse c/o low hemoglobin level , on going blood transfusion , afebrile , no SOB , denies any pain , negative of any blood transfusion reaction noted

## 2021-06-19 NOTE — ED PROVIDER NOTE - PROGRESS NOTE DETAILS
Patient was signed out to me by Dr. Brewer pending labs and to admit to OBS unit for blood transfusion. TD: Labs show hgb 6.7. 1unit pRBCs ordered, pt consented for blood transfusion. Endorsed to AURELIANO Vasquez for obs admission. Patient was signed out to me by Dr. Brewer pending labs and to place in OBS for blood transfusion. Patient remained stable in ED. Patient and his daughter states that, patient gets frequent blood transfusion, had 6 time transfusion in the past. Patient went to his dialysis today, and they did the blood work and noted to have low Hb, so was sent to ED for blood transfusion. patient denies cp/sob/abd pain. Patient denies f/c/n/v/abd pain. Patient has an appointment with his heart doctor on this Monday and is due for dialysis on this Tuesday. Patient is placed on OBS for blood transfusion and reevaluation.

## 2021-06-19 NOTE — ED CDU PROVIDER INITIAL DAY NOTE - PROGRESS NOTE DETAILS
Pt reports improvement in symptoms. Pt completed transfusion without any issues. Pt back at baseline, wants to go home. Pt and daughter understand need to follow-up with with his nephrologist/hemeonc. Pt given return precautions, agreeable to dc.

## 2021-06-19 NOTE — ED ADULT NURSE NOTE - NSIMPLEMENTINTERV_GEN_ALL_ED
Implemented All Universal Safety Interventions:  [place] to call system. Call bell, personal items and telephone within reach. Instruct patient to call for assistance. Room bathroom lighting operational. Non-slip footwear when patient is off stretcher. Physically safe environment: no spills, clutter or unnecessary equipment. Stretcher in lowest position, wheels locked, appropriate side rails in place.

## 2021-06-19 NOTE — ED PROVIDER NOTE - ATTENDING CONTRIBUTION TO CARE
72 y/o M of PMH DM, HTN MARLI, ESRD on HD (t/th/s), cirrhosis and anemia w/ hx multiple transfusions sent by pmd for hB 6.3 from dialysis labs today. + generalized weakness. + intermittent SOB. sx c/w prior anemia. No GI bleeding, cp, syncope. No n/v/d. + endoscopy on 6/3/21, + avm (ablated) and polyps.     CONSTITUTIONAL: NAD  SKIN: Warm dry  HEAD: NCAT  EYES: NL inspection  ENT: dry mm  NECK: Supple; non tender.  CARD: RRR  RESP: CTAB  ABD: distended, firm, non tender, no r/g  EXT: + peda edema  NEURO: Grossly unremarkable  PSYCH: Cooperative    IMP: Anemia  P: labs, plans for transfusion.

## 2021-06-19 NOTE — ED CDU PROVIDER INITIAL DAY NOTE - ATTENDING CONTRIBUTION TO CARE
Patient with chronic anemia, presented to ED for blood transfusion. Patient was initially evaluated by Dr. Brewer, as recommended was admitted to EDOU for blood transfusion.

## 2021-06-19 NOTE — ED CDU PROVIDER INITIAL DAY NOTE - PHYSICAL EXAMINATION
VITAL SIGNS: I have reviewed nursing notes and confirm.  CONSTITUTIONAL: Well-developed; well-nourished; in no acute distress.  SKIN: Skin exam is warm and dry, no acute rash.  HEAD: Normocephalic; atraumatic.  EYES: Conjunctiva and sclera clear.  ENT: No nasal discharge; airway clear.   CARD: S1, S2 normal; no murmurs, gallops, or rubs. Regular rate and rhythm.  RESP: No wheezes, rales or rhonchi. Speaking in full sentences.   ABD: Normal bowel sounds; soft; non-distended; non-tender; No rebound or guarding.   EXT: Normal ROM.   NEURO: Alert, oriented. Grossly unremarkable. No focal deficits.

## 2021-06-19 NOTE — ED PROVIDER NOTE - PHYSICAL EXAMINATION
CONSTITUTIONAL: Well-developed; well-nourished; in no acute distress.   SKIN: warm, dry  HEAD: Normocephalic; atraumatic.  EYES: PERRL, EOMI  ENT: No nasal discharge; airway clear.  NECK: Supple; non tender.  CARD: S1, S2 normal; no murmurs, gallops, or rubs. Regular rate and rhythm.   RESP: No wheezes, rales or rhonchi.  ABD: + mild distention, abdomen soft nt  EXT: Normal ROM  LYMPH: No acute cervical adenopathy.  NEURO: Alert, oriented, grossly unremarkable  PSYCH: Cooperative, appropriate.

## 2021-06-19 NOTE — ED PROVIDER NOTE - OBJECTIVE STATEMENT
Pt is a 71M with a pmhx of HTN, DM2, MARLI, ESRD on dialysis (tues/thurs/sat), cirrhosis (secondary to etoh, no longer drinking), and anemia (secondary to GIB) presenting with anemia. Pt has hx of recurrent anemia due to GIB necessitating transfusions, last tx 1mo. ago. Pt had upper endoscopy on 6/3/21 and was found to have two oozing lesions of angioectasia which were ablated. Since then, pt has felt well until the past few days when he has become fatigued and had mild SOB/lightheadedness but no chest pain. Pt was found to have hg of 6.8 at dialysis 2d ago, now today had hg of 6.3 and was instructed to present to ED for transfusion. No fevers/chills, pt has not fallen, no active bright red blood or melena seen in stool.

## 2021-06-19 NOTE — ED PROVIDER NOTE - CLINICAL SUMMARY MEDICAL DECISION MAKING FREE TEXT BOX
Patient remained stable in ED, tolerated blood transfusion well. Patient stated that, he was feeling lot better and requested to go home to follow up with his doctors. patient remained stable in ED, improved well, results of the diagnostic studies reviewed and discussed with patient, Patient remained awake, alert, and comfortable, tolerated PO. Discussed with patient in detail about the need for close outpatient follow up and the need to return to ED for any persistent, or worsening symptoms, for any new symptoms/concerns. patient verbalized understanding and agreed. patient is given detail aftercare instructions and is instructed well to f/u as outpatient for further care.

## 2021-06-19 NOTE — ED CDU PROVIDER INITIAL DAY NOTE - OBJECTIVE STATEMENT
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 Pt has hx of anemia 2/2 GIB requiring multiple transfusions, last one being a month ago. Pt had endoscopy done on 6/3/21 and was found to have 2 oozing lesions of angioectasia which were ablated. Pt states he was doing well up until a few days ago and he began to feel mild SOB and fatigue. Pt was found to have hemoglobin of 6.3 at dialysis and was instructed to go to ED for transfusion. Pt denies other complaints. Pt denies fever, chills, nausea, vomiting, abdominal pain, diarrhea, headache, dizziness, weakness, chest pain, back pain, LOC, trauma, urinary symptoms, cough, melena/BRBPR.

## 2021-06-20 NOTE — ED CDU PROVIDER DISPOSITION NOTE - CARE PROVIDER_API CALL
Alla Weber  INTERNAL MEDICINE  Counts include 234 beds at the [facility] [place] [address]  Phone: [phone]  Fax: [phone]  Follow Up Time: 1-3 Days

## 2021-06-20 NOTE — ED CDU PROVIDER DISPOSITION NOTE - PATIENT PORTAL LINK FT
You can access the FollowMyHealth Patient Portal offered by [facility] by registering at the following website: [email or web address]. By joining [facility]’s FollowMyHealth portal, you will also be able to view your health information using other applications (apps) compatible with our system.

## 2021-06-20 NOTE — ED ADULT NURSE REASSESSMENT NOTE - NS ED NURSE REASSESS COMMENT FT1
one unit PRBC done , negative of any blood transfusion reaction noted ,afebrile , negative hives, negative chills , negative itchiness , negative chest pain , negative nausea , no vomiting noted , denies back pain, AO x 4 , no SOB , Dr. Fournier aware of completed first unit  blood transfusion . No new order was given

## 2021-07-15 NOTE — ED PROVIDER NOTE - PHYSICAL EXAMINATION
Vital Signs: I have reviewed the initial vital signs.  Constitutional: well-nourished, appears stated age, no acute distress  Cardiovascular: regular rate, regular rhythm, well-perfused extremities  Respiratory: unlabored respiratory effort, clear to auscultation bilaterally  Gastrointestinal: soft, non-tender abdomen  Musculoskeletal: supple neck, (+) mild BL LE edema   Integumentary: warm, dry, no rash  Neurologic: awake, alert, extremities’ motor and sensory functions grossly intact  Psychiatric: appropriate mood, appropriate affect

## 2021-07-15 NOTE — ED PROVIDER NOTE - CLINICAL SUMMARY MEDICAL DECISION MAKING FREE TEXT BOX
70 y/o M presented to ED for SOB due to anemia. Pt’s hemoglobin found to be  6.6. Will place in observation for blood transfusion.

## 2021-07-15 NOTE — ED CDU PROVIDER INITIAL DAY NOTE - MEDICAL DECISION MAKING DETAILS
70 yo M presented to ED for anemia. Pt give 1 unit RBC. Pt has history of anemia requiring blood transfusions. Will monitor for fluid overload and reaction.

## 2021-07-15 NOTE — ED CDU PROVIDER INITIAL DAY NOTE - NS ED ROS FT
Review of Systems:  	•	CONSTITUTIONAL: no fever, no chills  	•	SKIN: no rash  	•	ENT: no sore throat   	•	RESPIRATORY: no shortness of breath, no cough  	•	CARDIAC: no chest pain, no palpitations  	•	GI: no abd pain, no nausea, no vomiting, no diarrhea  	•	GENITO-URINARY: no discharge, no dysuria; no hematuria, no increased urinary frequency  	•	MUSCULOSKELETAL: no joint paint, no swelling, no redness  	•	NEUROLOGIC: no headache, no weakness, no lightheadedness  	•	PSYCH: no anxiety, non suicidal, non homicidal, no hallucination, no depression

## 2021-07-15 NOTE — ED ADULT TRIAGE NOTE - CHIEF COMPLAINT QUOTE
sent in by dialysis for hemoglobin of 6.8. Pt reports generalized weakness, sob, blurred vision and dizziness. Pt has had 9 transfusions in the past. PMH AVM. Pt aphasic at baseline from previous stroke. no neuro deficits.

## 2021-07-15 NOTE — ED PROVIDER NOTE - ATTENDING CONTRIBUTION TO CARE
Attending Note: I personally evaluated the patient. I reviewed the Resident’s note (as assigned above), and agree with the findings and plan except as documented in my note.     72 y/o M with hx of HTN, DM, ESRD on dialysis, anemia requiring multiple blood transfusions, presents to Ed c/o dizziness and SOB. No f/c, CP, or lower extremity swelling.    PE: Const: (+) morbidly obese, well developed, appears stated age  Eyes:  (+) pale conjunctiva, PERRL, no conjunctival injection  HENT:  Neck supple without meningismus   CV: RRR, Warm, well-perfused extremities  RESP: CTA B/L, no tachypnea   GI: soft, non-tender, non-distended  MSK: No gross deformities appreciated  Skin: Warm, dry. No rashes  Neuro: Alert, CNs II-XII grossly intact. Sensation and motor function of extremities grossly intact.  Psych: Appropriate mood and affect.    Plan: Concern for symptomatic anemia. Will do labs.

## 2021-07-15 NOTE — ED CDU PROVIDER INITIAL DAY NOTE - OBJECTIVE STATEMENT
71 year old male, past medical history HTN, DM, MARLI, ESRD on HD T/R/Sat, Cirrhosis, anemia (2/2 GIB), who presents with dialysis hgb 6.8. Patient placed in OBS for transfusion. Denies fever, chills, sore throat, difficulty, lightheadedness, syncope, chest pain, SOB.

## 2021-07-15 NOTE — ED CDU PROVIDER INITIAL DAY NOTE - PHYSICAL EXAMINATION
CONSTITUTIONAL: Well-developed; well-nourished; in no acute distress, nontoxic appearing  SKIN: skin exam is warm and dry  HEAD: Normocephalic; atraumatic.  EYES: PERRL, conjunctiva and sclera clear.  ENT: MMM  NECK:  ROM intact.  CARD: S1, S2 normal, no murmur  RESP: No wheezes, rales or rhonchi. Good air movement bilaterally  ABD: soft; non-distended; non-tender.  EXT: Normal ROM.   NEURO: awake, alert, following commands, oriented, grossly unremarkable. No Focal deficits. GCS 15.   PSYCH: Cooperative, appropriate.

## 2021-07-15 NOTE — ED PROVIDER NOTE - NS ED ROS FT
Constitutional: (-) fever  Eyes/ENT: (-) blurry vision, (-) epistaxis  Cardiovascular: (-) chest pain, (-) syncope  Respiratory: (-) cough, (+) shortness of breath  Gastrointestinal: (-) vomiting, (-) diarrhea  Musculoskeletal: (-) neck pain, (-) back pain, (-) joint pain  Integumentary: (-) rash, (-) edema  Neurological: (-) headache, (-) altered mental status, (+) dizziness   Psychiatric: (-) hallucinations  Allergic/Immunologic: (-) pruritus

## 2021-07-15 NOTE — ED PROVIDER NOTE - NSFOLLOWUPINSTRUCTIONS_ED_ALL_ED_FT
Please follow up with your primary care doctor in 1-3 days  Please be aware of any new or worsening signs or symptoms that should prompt your return to the ER.      Anemia: The patient was found to have anemia on recent blood work.  The patient denies any bright red blood per rectum, melena or hematemesis.  The patient was guaiac-negative.  I recommend an upper endoscopy and colonoscopy to assess the anemia.  The patient was told of the risks and benefits of the procedure.  The patient was told of the risks of perforation, emergency surgery, bleeding, infections and missed lesions.  The patient agreed and will proceed with the procedures. The patient is to be n.p.o. after midnight and bowel prep was given.  The patient signed the consent.

## 2021-07-15 NOTE — ED CDU PROVIDER INITIAL DAY NOTE - ATTENDING CONTRIBUTION TO CARE
70 yo M with PMH os ESRD on dialysis with history of multiple blood transfusions presents to ED for anemia. Pt was found to have hgb of 6.6 and required blood.     Const: Well nourished, well developed, appears stated age  Eyes: PERRL, no conjunctival injection  HENT:  Neck supple without meningismus   CV: RRR, Warm, well-perfused extremities  RESP: CTA B/L, no tachypnea   GI: soft, non-tender, non-distended  MSK: No gross deformities appreciated  Skin: Warm, dry. No rashes  Neuro: Alert, CNs II-XII grossly intact. Sensation and motor function of extremities grossly intact.  Psych: Appropriate mood and affect.    blood given in obs without complication

## 2021-07-15 NOTE — ED PROVIDER NOTE - CARE PROVIDER_API CALL
Phil Haley  INTERNAL MEDICINE  [address] [place] [address]  Phone: [phone]  Fax: [phone]  Follow Up Time: 1-3 Days

## 2021-07-15 NOTE — ED PROVIDER NOTE - PROGRESS NOTE DETAILS
Hgb 6.6. Pt consented, will Obs for transfusion. Attending Note: I personally evaluated the patient. I reviewed the Resident’s note (as assigned above), and agree with the findings and plan except as documented in my note.   72 y/o M with hx of HTN, DM, ESRD on dialysis, anemia requiring multiple blood transfusions, presents to Ed c/o dizziness and SOB. No f/c, CP, or lower extremity swelling.  PE: Const: (+) morbidly obese, well developed, appears stated age  Eyes:  (+) pale conjunctiva, PERRL, no conjunctival injection  HENT:  Neck supple without meningismus   CV: RRR, Warm, well-perfused extremities  RESP: CTA B/L, no tachypnea   GI: soft, non-tender, non-distended  MSK: No gross deformities appreciated  Skin: Warm, dry. No rashes  Neuro: Alert, CNs II-XII grossly intact. Sensation and motor function of extremities grossly intact.  Psych: Appropriate mood and affect.  Plan: Concern for symptomatic anemia. Will do labs.

## 2021-07-15 NOTE — ED PROVIDER NOTE - PATIENT PORTAL LINK FT
You can access the FollowMyHealth Patient Portal offered by [facility] by registering at the following website: [email or web address]. By joining [employer]’s FollowMyHealth portal, you will also be able to view your health information using other applications (apps) compatible with our system.

## 2021-07-15 NOTE — ED PROVIDER NOTE - OBJECTIVE STATEMENT
The pt is a 71y M w/ hx of HTN, DMII, MARLI, ESRD on dialysis (T/R/Sat), cirrhosis (secondary to EtOH, no longer drinking), and anemia (secondary to GIB) presenting from dialysis for Hgb of 6.8. Pt completed full session. Endorses feeling dizzy and SOB (uses 3L O2 at home to sleep). Denies fever, chest pain, N/V, abdominal pain, diarrhea, dysuria/hematuria.

## 2021-07-17 NOTE — ED CDU PROVIDER DISPOSITION NOTE - NSCDUDISPOSITION_ED_ALL_ED_DT
IMPRESSION:    Basal ganglia bleed with midline shift   Hypertensive emergency   Acute hypoxic resp failure   Celllulits       PLAN:    CNS: SAT, Keppra per Neuro, EEG, neuro checks, 3% NACL, CTH if worse neuro exam     HEENT: Oral care, aspiration precautions,     PULMONARY:  HOB @ 45 degrees.  Vent changes as follows: RR 18/450/30/5     CARDIOVASCULAR: Keep SBP <140, start amlodipine, wean off nicardipine gtt, echo     GI: GI prophylaxis.  Feeding    RENAL:  Follow up lytes.  Correct as needed     INFECTIOUS DISEASE: Check blood culx, cont ancef     HEMATOLOGICAL:  DVT prophylaxis - seq     ENDOCRINE:  Follow up FS.  Insulin protocol if needed.    MUSCULOSKELETAL: bed rest     Lines: Rt TLC   Iwlkerson: Condom   Code status: Full, needs goc discussion   Prognosis: Poor   Dispo: MICU for now IMPRESSION:    Basal ganglia bleed with midline shift   Hypertensive emergency   Acute hypoxic resp failure   Celllulits       PLAN:    CNS: SAT, Keppra per Neuro, EEG, neuro checks, 3% NACL, CTH if worse neuro exam     HEENT: Oral care, aspiration precautions,     PULMONARY:  HOB @ 45 degrees.  Vent changes as follows: RR 18/450/30/5     CARDIOVASCULAR: Keep SBP <140, start amlodipine, wean off nicardipine gtt, echo     GI: GI prophylaxis.  Feeding    RENAL:  Follow up lytes.  Correct as needed     INFECTIOUS DISEASE: Check blood culx, cont ancef     HEMATOLOGICAL:  DVT prophylaxis - seq     ENDOCRINE:  Follow up FS.  Insulin protocol if needed.    MUSCULOSKELETAL: bed rest     Lines: Rt TLC   Wilkerson: Condom   Code status: Full, palliaitve care, DNR/DNI, liberation  Prognosis: Poor   Dispo: MICU for now IMPRESSION:    Basal ganglia bleed with midline shift   Hypertensive emergency   Acute hypoxic resp failure   Celllulits       PLAN:    CNS: SAT, Keppra per Neuro, EEG, neuro f/up    HEENT: Oral care, aspiration precautions,     PULMONARY:  HOB @ 45 degrees.  Vent changes as follows: RR 18/450/30/5     CARDIOVASCULAR: Keep SBP <140, start amlodipine, wean off nicardipine gtt, echo     GI: GI prophylaxis.  Feeding    RENAL:  Follow up lytes.  Correct as needed     INFECTIOUS DISEASE: Check blood culx, cont ancef     HEMATOLOGICAL:  DVT prophylaxis - seq     ENDOCRINE:  Follow up FS.  Insulin protocol if needed.    MUSCULOSKELETAL: bed rest     Lines: Rt TLC   Wilkerson: Condom   Code status: Full, palliaitve care, DNR/DNI, liberation  Prognosis: Poor   Dispo: MICU for now 15-Jul-2021 22:25

## 2021-07-17 NOTE — ED CDU PROVIDER DISPOSITION NOTE - PROVIDER TOKENS
FREE:[LAST:[your nephrologist],PHONE:[(   )    -],FAX:[(   )    -],FOLLOWUP:[1-3 Days]],PROVIDER:[TOKEN:[[id number]],FOLLOWUP:[Routine]]

## 2021-07-17 NOTE — ED CDU PROVIDER DISPOSITION NOTE - CARE PROVIDER_API CALL
your nephrologist,   Phone: (   )    -  Fax: (   )    -  Follow Up Time: 1-3 Days    Cyndee Nelson)  HematologyOncology; Internal Medicine; Medical Oncology  [address] [place] [address]  Phone: [phone]  Fax: [phone]  Follow Up Time: Routine

## 2021-07-17 NOTE — ED CDU PROVIDER DISPOSITION NOTE - PATIENT PORTAL LINK FT
You can access the FollowMyHealth Patient Portal offered by [facility] by registering at the following website: [email or web address]. By joining [employer]’s FollowMyHealth portal, you will also be able to view your health information using other applications (apps) compatible with our system.

## 2021-07-17 NOTE — ED CDU PROVIDER DISPOSITION NOTE - ATTENDING CONTRIBUTION TO CARE
70 yo M with PMH os ESRD on dialysis with history of multiple blood transfusions presents to ED for anemia. Pt was found to have hgb of 6.6 and required blood.     Const: Well nourished, well developed, appears stated age  Eyes: PERRL, no conjunctival injection  HENT:  Neck supple without meningismus   CV: RRR, Warm, well-perfused extremities  RESP: CTA B/L, no tachypnea   GI: soft, non-tender, non-distended  MSK: No gross deformities appreciated  Skin: Warm, dry. No rashes  Neuro: Alert, CNs II-XII grossly intact. Sensation and motor function of extremities grossly intact.  Psych: Appropriate mood and affect.    blood hung

## 2021-07-17 NOTE — ED CDU PROVIDER DISPOSITION NOTE - CLINICAL COURSE
72 yo M placed in observation for anemia requiring blood transfusion. Pt given blood without complication. DC home.

## 2021-07-30 PROBLEM — K74.60 CIRRHOSIS OF LIVER: Status: ACTIVE | Noted: 2020-02-18

## 2021-07-30 NOTE — HISTORY OF PRESENT ILLNESS
[_________] : Performed [unfilled] [de-identified] : 70-year-old male on dialysis with cirrhosis.  The patient comes for follow-up.  Ultrasound reveals cirrhosis with mild ascites.  The patient's weight is not changed significantly.  Overall he feels well however tired

## 2021-07-30 NOTE — PHYSICAL EXAM
[General Appearance - Alert] : alert [General Appearance - In No Acute Distress] : in no acute distress [General Appearance - Well Nourished] : well nourished [General Appearance - Well Developed] : well developed [Sclera] : the sclera and conjunctiva were normal [Neck Appearance] : the appearance of the neck was normal [Neck Cervical Mass (___cm)] : no neck mass was observed [Jugular Venous Distention Increased] : there was no jugular-venous distention [] : no respiratory distress [Respiration, Rhythm And Depth] : normal respiratory rhythm and effort [Exaggerated Use Of Accessory Muscles For Inspiration] : no accessory muscle use [Apical Impulse] : the apical impulse was normal [Heart Rate And Rhythm] : heart rate was normal and rhythm regular [Heart Sounds] : normal S1 and S2 [Bowel Sounds] : normal bowel sounds [Abdomen Soft] : soft [Abdomen Tenderness] : non-tender [Abnormal Walk] : normal gait [Oriented To Time, Place, And Person] : oriented to person, place, and time [Impaired Insight] : insight and judgment were intact [Affect] : the affect was normal [FreeTextEntry1] : 3+ edema

## 2021-08-05 NOTE — ED ADULT NURSE REASSESSMENT NOTE - NS ED NURSE REASSESS COMMENT FT1
Pt up for DC, as per pt's request, pt's daughter Rosemary called @ [phone] and notified of pt's DC and transportation home. Pt's daughter verbalized she is on her way to  pt. Pt updated on continuing plan of care, and verbalized an understanding.

## 2021-08-05 NOTE — ED PROVIDER NOTE - OBJECTIVE STATEMENT
70 yo male, pmh of htn, hld, dm, esrd HD t-th-sat, presents to ed for low h/h, took today, had dialysis, no sxs at this time, recent transfusion last month, no specific pain or radiation. Denies dark/bloody stools, cp, sob, weakness, nvd.

## 2021-08-05 NOTE — ED ADULT NURSE NOTE - OBJECTIVE STATEMENT
Patient present to ED for complains of lower hemoglobin and requires blood transfusion, denies recent falls, trauma, and bleeding.

## 2021-08-05 NOTE — ED ADULT NURSE NOTE - SUICIDE SCREENING QUESTION 3
[place] ambulatory encounter  [facility]  CONSULTATION    REFERRING PROVIDER:  Fercho Guzman MD     Chief Complaint   Patient presents with   • Breast Problem     Left Breast Fibroepithelial Lesion       HISTORY OF PRESENT ILLNESS:  This patient is seen at the request of Fercho Guzman MD for a new left breast fibroepithelial lesion. Patient is a 47 year old White  female who self palpated a left breast lump, at the 6 clock position and noticed this for approximately the last 6 months. She says it has not changed at all, and she denies any skin changes or nipple discharge. She has no breast pain and the mass is nontender. She states that she has an unchanged left nipple eversion since when she was a child. She underwent mammographic examination as part of her routine health maintenance and was noted to have a suspicious finding in the breast. On 7/30/2018 the patient underwent a bilateral screening mammogram with tomosynthesis which showed heterogeneously dense breast tissue, with no suspicious calcifications seen in either breast with a few scattered benign calcifications. However, the right breast had an approximately 2 cm upper outer quadrant nodule suggested, and the left breast had a slightly inner posterior 1.5 cm nodular density. To further evaluate these bilateral findings, additional views were recommended. A diagnostic examination was performed and this was interpreted as suspicious.  On 8/3/2018 the patient returned for bilateral diagnostic mammogram and a bilateral ultrasound which confirmed a 27 m nodule in the upper outer quadrant of the right breast and focused ultrasound of the right breast showed at the 10:00 position, 3 cm from the nipple, a 15 x 7 x 17 mm nodule felt to be a complex cyst, and adjacent to this was a circumscribed nodule possibly representing a fat Lobule measuring 13 x 6 x 10 mm. In the left breast, there was a partially circumscribed nodule identified  in the posterior just medial from the nipple line; focused ultrasound of the left breast showed a smoothly marginated nodule with low-level internal echoes measuring 6 x 5 x 3 mm. For this reason, a biopsy was recommended of both areas. The patient underwent an image guided biopsy that was uneventful of both breasts.  The right breast showed a 18 x 8 x 21 mm complex cyst at the 10:00 position, 3 Center meters from the nipple showing echogenic debris with an additional solid 13 x 10 x 7 mm circumscribed hypoechoic nodule at the 10:30 position, 2 cm from the nipple which was selected for biopsy. A wing-shaped clip was placed in the right breast. The left breast showed complicated cysts at the 11:30 and 3:00 position measuring up to 5 mm each, which were aspirated with complete collapse without suspicious residual soft tissue mass. There was also a solid hypoechoic lobulated mass in the left breast at the 6 clock position, 6 cm from the nipple, and a wing-shaped clip was placed. Pathology from the right breast 10:30 in core needle biopsy showed fibroadenoma, negative for atypia or malignancy, and was felt to be concordant. Pathology from the left breast 6:00 biopsy showed a benign fibroepithelial lesion, negative for atypia or malignancy; the left breast lesion is favored to be a fibroadenoma, however the possibility of a phyllodes tumor could not be fully excluded.    The patient has not had any additional specific breast complaints.  Aside from this left breast lump she palpated at the inferior aspect of the left breast, she denies any additional masses or lumps.  She has had no breast pain.  There have been no skin changes.  There has been no nipple discharge. The patient has no family history of ovarian cancer.  The patient's maternal great aunt was diagnosed with breast cancer at age 80. Her maternal grandfather was diagnosed with colon cancer at age 70 and her paternal aunt was diagnosed with colon cancer at age  50. She has 2 children son age 20, and a daughter aged 22; they are both healthy. She states she is otherwise healthy. She works as a  at [employer] in [place].    The patient is seen in consultation for evaluation and management of this newly diagnosed left breast palpable mass, biopsy proven fibroepithelial lesion.    GENERAL BREAST HISTORY:  Age at menarche: 15  Last menstrual period: 16  : 2. Para: 2. Miscarriages: 0. Abortions: 0.  Age at first completed pregnancy: 25.  Breast-feeding history: yes, 1 year.  History of oral contraceptives: yes, 7 years.  History of hormone replacement therapy or fertility assistance: fertility assistance 7 years.  Previous Breast Biopsies or Surgeries: 18  Ethnicity:  Telugu, Polish, [place].      PERTINENT BREAST IMAGING:  I personally reviewed the relevant images for this patient and concur with the radiologist's impression.    Addendum: It should also be noted that the 2 complicated cysts at the 11:30 and 3:00 position in the left breast both completely collapsed with needle aspiration without suspicious residual. No fluid was sent.      Core biopsy pathology of the right breast reveals a benign fibroadenoma with condensed fibrotic stroma and sclerosing adenosis which correlates with the imaging and procedural findings. No atypia or malignancy.     Left breast biopsy reveals a benign fibroepithelial lesion however pathology does note that the possibility of a phyllodes tumor cannot be excluded. Myxoid stroma with mild increased periductal stromal cellularity is noted. No atypia or malignancy identified. Given that this left breast lesion at the 6:00 position 6 cm from the nipple is newly apparent mammographically and is also presenting as a newly apparent palpable mass on clinical exam, recommend referral to breast surgery for complete excision.     Results were discussed with the patient on 2018 at 1:00 PM. Surgical referral  numbers provided.   Addended by Fercho Guzman MD on 8/9/2018  1:04 PM      Study Result     US GUIDE BREAST FNA 2 LESIONS LEFT, BIOPSY 1 LESION LEFT AND BX 1 LESION RIGHT     PROCEDURE:  Ultrasound guided bilateral breast biopsy and clip placement with mammographic confirmation. 2 left breast fine-needle aspirations without clip placement.     CLINICAL HISTORY: Suspicious lesions.     FINDINGS: 18 x 8 x 21 mm complex cyst right breast 10:00 position 3 cm from the nipple demonstrates dependent echogenic debris which moves with positioning. There is confirmation of a solid 13 x 10 x 7 mm circumscribed hypoechoic nodule 10:30 position 2 cm in the nipple which will be selected for biopsy. Right axilla is unremarkable.     Left breast demonstrates what appears to be complicated cysts at the 11:30 at 3:00 position measuring up to 5 mm each. There is a solid hypoechoic lobulated parallel mass in the left breast at the 6:00 position 6 cm in the nipple which is newly apparent mammographically and palpable. This mass will be selected for biopsy. Ultrasound of the left axilla demonstrates a 22 mm long lymph node with fatty hilum and mild cortical thickening up to 4 mm which is not significantly changed mammograms dating back to 2015 most compatible with a stable benign lymph node.     TECHNICAL DESCRIPTION:      Written informed consent was obtained. Timeout was performed. The correct  procedure site was marked with an indelible marker by the performing clinician.     RIGHT SIDE:  Using ultrasound guidance and 5 cc buffered 1% local lidocaine, 2 12  gauge core needle biopsies were acquired of the suspicious lesion at the 10:30 position right breast with a vacuum assisted [employer]comero  biopsy device. A clip was placed and confirmed mammographically. The lesion was very dense resulting in difficulty with needle passage through the lesion.     Clip type: Bard  Clip shape: Wing     LEFT SIDE:  Using ultrasound guidance and 6  cc buffered 2% local lidocaine, three 12 gauge core needle biopsies were acquired of the suspicious lesion at the 6:00 position left breast with a vacuum assisted ShoeDazzleero  biopsy device.  A clip was placed and confirmed mammographically. The clip is only visible on the cc view localizing more inferiorly.     Clip type: Bard  Clip shape: Wing     Hemostasis was obtained via manual compression at the completion of the procedure. Sterile dressing applied.     Fine-needle aspiration of the 11:30 at 3:00 complex cysts of the left breast was performed with an 18-gauge needle demonstrating complete cystcollapse without suspicious residual soft tissue mass.     No immediate complications.     IMPRESSION:     Successful bilateral breast biopsy, pathology pending          PERTINENT PATHOLOGY:  I personally reviewed the relevant pathology for this patient.    Pathology Report   8/8/18      Client: [facility]           Submitting Physician: Ariel Miguel MD         Additional Physician(s): Fercho Guzman MD         Date Specimen Collected: 08/08/18           Accession #:  [id number]     Date Specimen Received:  08/08/18           Requisition #:[id number]     Date Reported:           8/9/2018 11:06    Location: [facility]       ______________________________________________________________________________     Pathologic Diagnosis :     A: Breast, right, 10:30, biopsy:     - Fibroadenoma.     - Negative for atypia and malignancy.         B: Breast, left, 6 o'clock, biopsy:     - Benign fibroepithelial lesion, see comment.     - Negative for atypia and malignancy.         Diagnosis Comment:     The left breast lesion is favored to be a fibroadenoma but the possibility of a phyllodes tumor cannot be fully excluded.         Gennaro Bar MD     ** Electronic Signature ([facility]) 8/9/2018 11:06 **     ______________________________________________________________________________         Clinical Information:      WHAT IS THE SPECIMEN TYPE:RT BREAST 1030 N+2 13MM CIRCUMSCRIBED HYPOECHOIC     MASS-LEFT BREAST 600 N+6 20MM CIRCUMSCRIBED HYPOECHOIC MASS WITH SOME     LOBULATION     HOW MANY SPECIMENS ARE BEING SUBMITTED?:2     NUMBER OF CONTAINERS?:2     WHAT IS THE TIME SPECIMEN PLACED IN FORMALIN?:1135     TIME SPECIMEN OBTAINED:1135         Specimen(s) Submitted:     A:  RIGHT BREAST 10:30     B:  LEFT BREAST 6 O'CLOCK         Gross Description:     A: Specimen labeled:  Properly labeled with the patient's name \"AM- Right     breast 10:30 o'clock N+2\"     Fixative: Formalin     Radiograph: Not Received     Cores of fibrofatty tissue: two     Length: 1.5 cm and 1.7 cm     Diameter: averaging 0.3 cm     Inked: Black         Cassettes: Entirely submitted in one cassette.         Time tissue obtained: 1135 (08/08/2018)     Time in formalin: 1135 (08/08/2018)     Formalin end time: 2200 (08/08/2018)         B: Specimen labeled:  Properly labeled with the patient's name \"AM- Left     breast 6 o'clock N+6\"     Fixative: Formalin     Radiograph: Not Received     Cores of fibrofatty tissue: three     Length: 1.2- 1.3 cm     Diameter: averaging 0.3 cm     Inked: Lime         Cassettes: Entirely submitted in one cassette.         Time tissue obtained: 1135 (08/08/2018)     Time in formalin: 1135 (08/08/2018)     Formalin end time: 2200 (08/08/2018)         [facility] 8/8/2018 04:29 PM             Microscopic Description:     A: The specimen is examined on three H and E stained levels. The presence of     black ink is confirmed microscopically. The slides demonstrate cores of breast     tissue that focally exhibit condensed and fibrotic stroma with some sclerosing   adenosis and compressed ducts which are findings compatible with a fibroadenoma. The background breast tissue shows benign breast parenchyma with histologically unremarkable lobules. There is no atypia or malignancy.         B: The specimen is examined on three H and E stained  levels. The presence of lime green ink is confirmed microscopically. The slides demonstrate a fibroepithelial lesion with myxoid-appearing stroma. The ducts range from slitlike and compressed to slightly more open. There is focal mild increased periductal stromal cellularity but there is no stromal cytologic atypia,     mitotic activity or stromal overgrowth. There is no epithelial atypia or malignancy.         The findings were telephoned to Dr. Guzman on 8/9/2018.         Jules Dumont MD has reviewed this case and concurs with the interpretation.         ANTOINE/antoine 08/09/18      PAST MEDICAL HISTORY:  Past Surgical History:   Procedure Laterality Date   • Past surgical history  8/1/2011    PSH of wisdom teeth   • Us guide breast biopsy single lesion left Left 08/08/2018    600 N+6   • Us guide breast biopsy single lesion right Right 08/08/2018    1030 N+2   • Us guide breast fna 2 lesions left Left 08/08/2018    300 N+2, 1130 retroareolar     Past Medical History:   Diagnosis Date   • Other acne        Current Outpatient Prescriptions   Medication Sig Dispense Refill   • Omega-3 Fatty Acids (OMEGA 3 PO)      • imiquimod (ALDARA) 5 % cream Apply topically as directed. 5 nights a week 24 packet 1   • Cyanocobalamin (VITAMIN B 12 PO)        No current facility-administered medications for this visit.        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  She drinks 1-2 caffeinated beverages daily.      Family History   Problem Relation Age of Onset   • Parkinsonism Father    • Patient is unaware of any medical problems Brother    • Patient is unaware of any medical problems Brother    • Cancer Maternal Grandfather 70        colon   • Ophthalmology Maternal Grandmother         macular degeneration   • Cancer Paternal Grandmother 50        liver   • Cancer Paternal Grandfather 70   • Patient is unaware of any medical problems Son    • Patient is unaware of any medical problems Daughter    • Cancer Paternal Aunt 50        colon   • Cancer Other 80        breast     2 Brothers. 0 Sisters. 1 Maternal Aunts. 1 Maternal Uncles.  2 Paternal Aunts.  5 Paternal Uncles.          REVIEW OF SYSTEMS:  Constitutional: The patient has no fevers, chills, or recent weight gain or loss.Change in diet last year-lost 20 lbs  Eyes: No double vision or blurred vision. No history of glaucoma.Last eye exam: spring 2018  Ears, Nose and Throat: The patient has no sinus problems, dentures, bleeding gums, sore throat, or new hoarseness.  Cardiac: No new chest pain or pressure. No waking at night short of breath. The patient has never had a heart attack.  Pulmonary: No new shortness of breath, cough or sputum production. No history of emphysema or asthma.Can walk a block and take a flight of stairs  Gastrointestinal: No nausea, vomiting, abdominal pain, change in bowel habits or blood in the stool. No history of jaundice or hepatitis.  Genitourinary: No burning on urination. No blood in the urine. No history of kidney disease.Last pelvic exam: July 2018, no pap, normal  Neurological: No history of stroke, seizure, unilateral numbness or weakness, history of depression or loss of memory.  Musculoskeletal: No new bone pain, muscle weakness or joint discomfort.  Skin: No rash, unusual pigmentation, or history of skin cancer.  Hematologic/Lymphatic: No history of easy bruising. No  bleeding problems. No swollen or enlarged lymph nodes.   Endocrine: No history of thyroid dysfunction. No history of diabetes.  Menstrual History: The patient is premenopausal. No unusual menstrual complaints.    PHYSICAL EXAMINATION:  Vital Signs:   Vitals:    08/13/18 1039   BP: 124/76   Pulse: 67   Resp: 12   Temp: 97.9 °F (36.6 °C)   TempSrc: Oral   SpO2: 100%   Weight: 56.8 kg   Height: 5' 4.17\" (1.63 m)     General: The patient is well-developed and well-nourished. She is in no acute distress.  Eyes: Pupils equally round and reactive to light.  Ears, Nose and Throat: No oral mucosal lesions. Thyroid is not enlarged. Trachea is midline.  Pulmonary: Clear to auscultation and percussion. No wheezing. No rhonchi.  Cardiac: Regular rhythm. No gallops or murmurs are appreciated.  Gastrointestinal: Normal active bowel sounds. Liver is not palpable. No masses are appreciated.  Musculoskeletal: No axial skeletal tenderness. No costovertebral angle tenderness.  Extremities: No clubbing or cyanosis.  Neurological: Oriented x3. Normal muscle strength. Sensory is intact.  Breasts: A bilateral breast examination was performed in both the sitting and supine positions. Approximate B+ cup. Minimal ptosis.  Right: Normal contour. No retraction or dimpling of the skin or nipple. There is a percutaneous biopsy site at the 9:30 position of the right breast with overlying biopsy site change with scattered ecchymosis and a mild 1cm hematoma. No additional dominant masses.  Left: Normal contour. No retraction or dimpling of the skin. Unchanged left nipple inversion, no skin changes. There is a percutaneous biopsy site at the 2:00 position where the cysts were aspirated. There is also a percutaneous biopsy site at the 6:00 position of the left breast with overlying biopsy site changes and about a 2cm hematoma with scattered ecchymosis. No additional dominant masses.  Lymph Nodes: No cervical, supraclavicular, or axillary  adenopathy.      LABORATORY:  All pertinent laboratory results were reviewed.  Lab Results   Component Value Date    WBC 4.1 (L) 08/31/2018    HCT 35.9 (L) 08/31/2018    HGB 12.1 08/31/2018     08/31/2018     Lab Results   Component Value Date    GLUCOSE 101 (H) 08/31/2018    SODIUM 141 08/31/2018    POTASSIUM 3.7 08/31/2018    CHLORIDE 102 08/31/2018    BUN 6 08/31/2018    CREATININE 0.78 08/31/2018    CALCIUM 9.3 08/31/2018    ALBUMIN 4.8 05/12/2005    AST 22 05/12/2005    ALKPT 70 05/12/2005    GPT 19 05/12/2005    ANIONGAP 15 08/31/2018    BCRAT 8 08/31/2018       IMPRESSION:   47-year-old female with new self palpated left breast mass, biopsy-proven left fibroepithelial lesion; also with right breast typical fibroadenoma       Visit Diagnoses     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 We reviewed in depth the patient's imaging and her clinical examination. An extensive discussion of the overall treatment of fibroepithelial lesions of the breast was had with the patient.     Specifically I reviewed the management of phyllodes tumors. Phyllodes tumors are relatively uncommon fibroepithelial breast tumors that are capable of an adverse range of biologic behaviors. At least aggressive form, phyllodes tumors behave like benign fibroadenomas, although with a propensity to recur locally following excision without wide margins. At the other end of the spectrum, other phyllodes tumors can metastasize distantly, sometimes degenerating histologically into sarcomatous lesions. Although likely either a fibroadenoma or benign phylloides tumor of the left breast, given the fact we cannot characterize this on core needle biopsy, excisional biopsy is recommended.     The current recommendation is that fibroepithelial lesions require complete surgical excision, as phyllodes tumors should be  completely excised because most have associated high local recurrence rates. Positive margins often occur when phyllodes tumors are misdiagnosed as fibroadenomas and either enucleated or locally excised without attention to margins. A positive margin requires re-excision. The minimal acceptable margin beyond \"clear\", however is controversial and depends on tumor grade. I would aim for a 1 cm margin for phyllodes tumors, however a narrower but clear margin for benign but not borderline or malignant phyllodes tumors may be accepted. As long as adequate margins can be achieved, breast conserving surgery versus mastectomy is equally effective in treating phyllodes tumors. Mastectomy is generally not indicated for benign phyllodes tumors unless negative margins cannot be achieved and/or if the tumor is so large that breast conserving surgery would result in suboptimal cosmetic outcomes. Phyllodes tumors are known to recur locally with rates that vary with tumor grade, with local recurrence rates of 8% for benign, 21% for borderline and 36% for malignant phyllodes tumors in early published studies. More contemporary series report lower rates of local recurrence, and an overall recurrence rate for phyllodes tumors was only 6.3%.     Axillary surgery is not routinely indicated for patients diagnosed with phyllodes tumors. Adjuvant radiation therapy is also routinely not suggested for widely excised phyllodes tumors, however we will wait for final pathology to make that determination. Furthermore, there have been no randomized studies of adjuvant chemotherapy specifically for phyllodes tumors. Hormone therapy is also generally not effective against phyllodes tumors.     The patient is being scheduled for a left breast excisional biopsy of this left breast mass, biopsy-proven fibroepithelial lesion. I have reviewed the conduct of the surgical procedure. The need for going to radiology for wire localization on the morning of  surgery was discussed with the patient. The indications and rationale along with the conduct of the planned surgical procedure were reviewed.  I reviewed the anticipated cosmetic results with the patient.  Possible complications including but not confined to bleeding, infection, and wound healing problems were reviewed. I discussed that the goals of the procedure are to achieve clear margins. I discussed that should there be involvement of the margins or in the scenario of close margins, a return to the operating room may be appropriate to re-excise the area of concern to decrease local recurrence rates. If clear margins cannot be achieved, a mastectomy may be a final option. The possible use of drains was discussed. The patient verbalized understanding and requested that we proceed.    Furthermore, we also discussed the biopsy-proven typical fibroadenoma of the right breast. I discussed that typical-type fibroadenomas are benign, non-cancerous tumors of the breast.  On occasion, they can increase in size and they may cause pain.  The chance that they may undergo malignant degeneration is considered remote.  Options for management included observation versus excision.  The patient has opted to monitor the biopsy-proven fibroadenoma of the right breast, for which we will obtain a 6 month mammogram/ultrasound.      At the patient's request, we will have our financial counselors reach out to her.    The patient indicated understanding of the diagnosis and agreed with the plan of care. They are encouraged to call our office with any additional questions or concerns.    90 minutes were spent with the patient, >50% of which were spent in education, counseling and coordination of care.    Instructions provided as documented in the After Visit Summary.    I wish to thank Fercho Guzman MD for the privilege of being able to participate in the evaluation and care of this patient.    John Sorensen MD  8/13/18    CC:  Fercho Guzman MD   CC: Kirsty Bales MD     No

## 2021-08-05 NOTE — ED PROVIDER NOTE - CLINICAL SUMMARY MEDICAL DECISION MAKING FREE TEXT BOX
Pt with anemia, sent in from dialysis, transfused without incident.  pt dc to f/u with pmd/nephrology outpt

## 2021-08-05 NOTE — ED PROVIDER NOTE - ATTENDING CONTRIBUTION TO CARE
72 yo f with dm, htn, esrd on hd t/th/sat, sent by dialysis for transfusion.  as per pt, he got routine labs today and told hgb 6.5.  pt admits feels tired.  no sob.  no cp.  exam: nad, ncat, perrl, eomi, mmm, rrr, mild basilar rales, abd soft, nt,nd, obese aox3, R AVF imp: pt with anemia, will transfuse

## 2021-08-05 NOTE — ED PROVIDER NOTE - PATIENT PORTAL LINK FT
You can access the FollowMyHealth Patient Portal offered by [facility] by registering at the following website: [email or web address]. By joining [employer]’s FollowMyHealth portal, you will also be able to view your health information using other applications (apps) compatible with our system.

## 2021-08-16 NOTE — ED CDU PROVIDER DISPOSITION NOTE - PATIENT PORTAL LINK FT
You can access the FollowMyHealth Patient Portal offered by [facility] by registering at the following website: [email or web address]. By joining [employer]’s FollowMyHealth portal, you will also be able to view your health information using other applications (apps) compatible with our system.

## 2021-08-16 NOTE — ED CDU PROVIDER INITIAL DAY NOTE - OBJECTIVE STATEMENT
Pt is a 70 yo male , pmh of HTN, HLD, DM, ESRD,  HD T-Th-Sat, presents to ED for low hemoglobin. Of note pt has a history of multiple visits in the last few months for recurrent transfusion. He received an EGD in June which showed gastritis and small angiectasias which were ablated.  Pt denies  history of dark/bloody stools, chest pain, SOB , nausea , vomiting or diarrhea. pt reports he is currently being worked up outpatient. Has colonoscopy scheduled w/ GI in a few weeks.

## 2021-08-16 NOTE — ED CDU PROVIDER INITIAL DAY NOTE - PROGRESS NOTE DETAILS
CTAB. speaking in complete sentences. wants to go. VSS. Reviewed all results and necessity for follow up. Counseled on red flags and to return for them.  Patient appears well on discharge.

## 2021-08-16 NOTE — ED ADULT NURSE REASSESSMENT NOTE - NS ED NURSE REASSESS COMMENT FT1
Pt received to ED hold area, transfusion of pRBC in progress at 86mls/hr, same increased to transfuse as fast as possible per order. No signs on transfusion reaction noted or reported. Pt appears comfortable. Respiration even and unlabored. Placed on cardiac monitor, NSR noted. Pt made comfortable. Call bell paced within reach. Son at bedside. Will monitor closely .

## 2021-08-16 NOTE — ED PROVIDER NOTE - OBJECTIVE STATEMENT
70 yo male, pmh of htn, hld, dm, esrd HD t-th-sat, presents to ed for low h/h, 6.7 today, had dialysis, no sxs at this time, recent transfusion a few days back no specific pain or radiation. Denies dark/bloody stools, cp, sob, weakness, nvd. Pt is being worked up for anemia currently by a GI doctor, due for endoscopy next week.

## 2021-08-16 NOTE — ED CDU PROVIDER INITIAL DAY NOTE - ATTENDING CONTRIBUTION TO CARE
72 yo male, pmh of htn, hld, dm, esrd HD t-th-sat, presents to ed for low h/h, 6.7 today, had dialysis, no sxs at this time, recent transfusion a few days back no specific pain or radiation. Denies dark/bloody stools, cp, sob, weakness, nvd. Pt is being worked up for anemia currently by a GI doctor, due for endoscopy next week.

## 2021-08-16 NOTE — ED ADULT TRIAGE NOTE - CHIEF COMPLAINT QUOTE
pt sent by dialysis center for hemoglobin of 6.3 . denies any active bleeding. pt denies any pain. pt  c/o dizziness x 2 days. pt with hx of blood transfusions in past

## 2021-08-18 NOTE — ED ADULT TRIAGE NOTE - PAIN RATING/NUMBER SCALE (0-10): ACTIVITY
Name:Zachery Tolbert   1939  : Surgeon: Santos Luna MD:  Date: 2/8/21      Hospital:[facility]  Arrival Time:8:45 am  CAROTID ULTRASOUND    Procedure description:  A carotid ultrasound is an exam used to evaluate the main arteries that feed the brain.     What to expect:  This exam takes approximately 30 minutes to complete. The technologist will put a water-based gel on your skin and take images of the carotid arteries in your neck.    Patient instructions:  You should not have any caffeine, cigarettes, and other products that contain nicotine for six hours before your exam, and please wear loose clothing around your neck.     Where to arrive:  Please go up to the 1st floor registration desk and then up to the 4th floor, and the registration desk for Vascular Ultrasound is visible to your left.     Doctors Appointment: 9:45 am with Dr Luna [id number]    [x] Copy provided to patient.    [x] Patient verbalized understanding of instructions and all questions were answered.            Duane Nguyen RN, BSN  
4

## 2021-08-19 NOTE — ED PROVIDER NOTE - ATTENDING CONTRIBUTION TO CARE
I personally evaluated the patient. I reviewed the Resident’s or Physician Assistant’s note (as assigned above), and agree with the findings and plan except as documented in my note.  71 M with hx of ESRD on HD t/th/sat, nephrologist is Dr. Amin, HTN, HLD, DM, presents with complaints of 1 day of right sided flank pain associated with hematuria and dysuria. Denies hx of kidney stones. VS reviewed, pt non-toxic appearing, NAD. Head ncat, MMM, neck supple, normal ROM, normal s1s2 without any murmurs, Lungs CTAB with normal work of breathing. abd +BS, s/nd/+ right lower abd ttp w/o guarding or rebound, no CVAT b/l, extremities wnl, neuro exam grossly normal. No acute skin rashes. Plan is labs, imaging, meds as needed and dispo accordingly.

## 2021-08-19 NOTE — ED PROVIDER NOTE - CLINICAL SUMMARY MEDICAL DECISION MAKING FREE TEXT BOX
Patient was signed out to me by Dr. Disla pending official attending radiology reading of the CT scan. patient remained stable in ED, improved well, results of the diagnostic studies reviewed and discussed with patient, Patient remained awake, alert, and comfortable, tolerated PO. Discussed with patient in detail about the need for close outpatient follow up and the need to return to ED for any persistent, or worsening symptoms, for any new symptoms/concerns. patient verbalized understanding and agreed. patient is given detail aftercare instructions and is instructed well to f/u as outpatient for further care.

## 2021-08-19 NOTE — ED PROVIDER NOTE - OBJECTIVE STATEMENT
71 year old male with pmhx of End stage renal disease, T TH S, on HD, (dr alberts) , presents with hematuria x 1 day. pt admits to 2 episodes of bloody urine. also admits to right lower abdominal pain, with radiation to right lower back. pt denies nausea, vomiting, diarrhea, fever, chills, dysuria, or frequency. no chest pain or sob. no trauma.

## 2021-08-19 NOTE — CONSULT NOTE ADULT - ASSESSMENT
71 year old male with ESRD on HD Tuesday, Thursday and Saturday with Dr. Amin presents to the ED c/ hematuria x1day.     Plan:  ·	No acute surgical/urologic intervention indicated at this time  ·	Patient's Hb is 7.1, (baseline Hb is 6-7); stable  ·	Patient not actively hematuric at this time  ·	CTAP without acute intraabdominal pathology  ·	Recommend OP f/u with urologist and nephrologist for further management  ·	Recommend discharge with abx  ·	F/u urine culture  ·	Plan discussed with patient and ED team, all agreeable

## 2021-08-19 NOTE — CONSULT NOTE ADULT - SUBJECTIVE AND OBJECTIVE BOX
Urology Consult Note: 71 year old male with a pmh of HTN, RA, MARLI and ESRD on HD Tuesday, Thursday and Saturday with Dr. Amin presents to the ED c/ hematuria . Patient states that earlier today he went to the bathroom to urinate and noticed that his urinae had a small amoun tof blood in it. Patient went to the bathroom at home a second time and noticed that he had a little bit more on this occasion when compared to the first and told his daughter who brought him to the hospital for evaluation. Patient states that earlier he had pain to his RLQ with radiation to his RIGHT lower back but no longer had this pain when he arrived to the ED.  called further evaluation of patient's hematuria. Patient denies nausea, vomiting, CP, SOB, trauma, diarrhea, fever, chills, dysuria, or frequency or other LUTS.    PAST MEDICAL & SURGICAL HISTORY:  HTN (hypertension)    Diabetes mellitus    ESRD (end stage renal disease) on dialysis    H/O rheumatic heart disease    MARLI (obstructive sleep apnea)    Anemia    GIB (gastrointestinal bleeding)    H/O aortic valve replacement    H/O mitral valve replacement    H/O cardiac pacemaker    AV fistula  right upper arm    [ x ] A 10 Point Review of Systems was negative except where noted    Allergies: No Known Allergies    SOCIAL HISTORY: No illicit drug use    FAMILY HISTORY:  No pertinent family history in first degree relatives    Vital Signs Last 24 Hrs  T(C): 36.7 (18 Aug 2021 22:00), Max: 36.7 (18 Aug 2021 22:00)  T(F): 98 (18 Aug 2021 22:00), Max: 98 (18 Aug 2021 22:00)  HR: 65 (18 Aug 2021 22:00) (65 - 65)  BP: 122/58 (18 Aug 2021 22:00) (122/58 - 122/58)  RR: 17 (18 Aug 2021 22:00) (17 - 17)  SpO2: 100% (18 Aug 2021 22:00) (100% - 100%)    PHYSICAL EXAM:  Constitutional: NAD, well-developed, well nourished, comfortably laying in bed  HEENT: NC/AT  Back: No CVA tenderness bilaterally  Respiratory: No accessory respiratory muscle use  Abd: Soft, NT/ND  Cardio: +S1/S2  : no suprapubic tenderness to palpation, voiding tomeka colored urine into urinal  Extremities: No edema or cyanosis, BROWNLEE x 4, + RUE AV fistula  Neurological: Awake and alert  Psychiatric: Normal mood, normal affect  Skin: Good color, non diaphoretic    LABS:                      7.1    4.53  )-----------( 109      ( 19 Aug 2021 00:00 )             23.1       138  |  94<L>  |  40<H>  ----------------------------<  130<H>  4.3   |  32  |  5.7<HH>    Ca    9.0      19 Aug 2021 00:00  TPro  7.0  /  Alb  3.8  /  TBili  1.2  /  DBili  x   /  AST  24  /  ALT  8   /  AlkPhos  227<H>    PT/INR - ( 19 Aug 2021 00:00 )   PT: 13.40 sec;   INR: 1.17 ratio     PTT - ( 19 Aug 2021 00:00 )  PTT:35.4 sec    Urinalysis Basic - ( 19 Aug 2021 00:00 )  Color: Red / Appearance: Turbid / S.020 / pH: x  Gluc: x / Ketone: Negative  / Bili: Negative / Urobili: <2 mg/dL   Blood: x / Protein: Negative / Nitrite: Negative   Leuk Esterase: Moderate / RBC: >720 /HPF / WBC 10 /HPF   Sq Epi: x / Non Sq Epi: x / Bacteria: x    RADIOLOGY & ADDITIONAL STUDIES:  < from: CT Abdomen and Pelvis w/ IV Cont (21 @ 02:14) >    EXAM:  CT ABDOMEN AND PELVIS IC            PROCEDURE DATE:  2021     INTERPRETATION:  CLINICAL STATEMENT: Flank pain.    TECHNIQUE: Contiguous axial CT images were obtained from the lower chest to the pubic symphysis following administration of intravenous contrast.  Oral contrast was not administered.  Reformatted images in the coronal and sagittal planes were acquired.    COMPARISON CT: 2019.      FINDINGS:    LOWER CHEST: Bibasilar subsegmental atelectasis. Cardiomegaly. Partially imaged cardiac leads.    HEPATOBILIARY: The liver again demonstrates a slightly nodular hepatic contour. The gallbladder is obscured by adjacent ascites.    SPLEEN: Splenomegaly, not significant changed measuring 14 cm craniocaudad, previously 13.7 cm. There is a 1.6 cm hypodensity at the superior aspect of the spleen, likely unchanged in retrospect although partially obscured on prior study by motion    PANCREAS: Unremarkable.    ADRENAL GLANDS: Unremarkable.    KIDNEYS: Component of bilateral renal atrophy. No hydronephrosis. Bilateral renal cysts and hypodensities too small to characterize. A 2.2 cm homogeneously hyperdense indeterminate right renal lesion is unchanged from prior. Limited evaluation for nonobstructing renal calculi.    ABDOMINOPELVIC NODES: Unremarkable.    PELVIC ORGANS: Collapsed urinary bladder.    PERITONEUM/MESENTERY/BOWEL: Unremarkable appendix. No bowel obstruction or pneumoperitoneum. Small to moderate volume abdominopelvic ascites.    BONES/SOFT TISSUES: Small fat-containing umbilical hernia. Degenerative changes of the spine. Unchanged severe degenerative changes of the left hip.    OTHER: Vascular calcifications.      IMPRESSION:  No definite acute intra-abdominal or pelvic pathology is identified.    Redemonstrated findings compatible with cirrhosis and portal hypertension.    Slight increase in overall small volume, borderline moderate, abdominopelvic ascites when compared to prior 2019 study.    Additional incidental findings as above.    --- End of Report ---    COCO WATT MD; Resident Radiologist  This document has been electronically signed.  TISHA FISCHER MD; Attending Radiologist  This document has been electronically signed. Aug 19 2021  5:05AM    < end of copied text >

## 2021-08-19 NOTE — ED ADULT NURSE NOTE - EXTENSIONS OF SELF_ADULT
None V-Y Flap Text: The defect edges were debeveled with a #15 scalpel blade.  Given the location of the defect, shape of the defect and the proximity to free margins a V-Y flap was deemed most appropriate.  Using a sterile surgical marker, an appropriate advancement flap was drawn incorporating the defect and placing the expected incisions within the relaxed skin tension lines where possible.    The area thus outlined was incised deep to adipose tissue with a #15 scalpel blade.  The skin margins were undermined to an appropriate distance in all directions utilizing iris scissors.

## 2021-08-19 NOTE — ED PROVIDER NOTE - CARE PLAN
Principal Discharge DX:	Urinary tract infection with hematuria  Secondary Diagnosis:	Cirrhosis of liver with ascites  Secondary Diagnosis:	Splenic lesion  Secondary Diagnosis:	Renal lesion  Secondary Diagnosis:	Portal hypertension   1

## 2021-08-19 NOTE — ED PROVIDER NOTE - PROGRESS NOTE DETAILS
JOSHUA: Reviewed all results and necessity for follow up. Counseled on red flags and to return for them.  Patient appears well on discharge.

## 2021-08-19 NOTE — ED PROVIDER NOTE - PATIENT PORTAL LINK FT
You can access the FollowMyHealth Patient Portal offered by [facility] by registering at the following website: [email or web address]. By joining [email or web address]’s FollowMyHealth portal, you will also be able to view your health information using other applications (apps) compatible with our system.

## 2021-08-19 NOTE — ED PROVIDER NOTE - CARE PROVIDER_API CALL
Akash Lu)  Urology  [address] [place] [address]  Phone: [phone]  Fax: [phone]  Follow Up Time: 4-6 Days

## 2021-08-19 NOTE — ED PROVIDER NOTE - NS ED ROS FT
Review of Systems:  	•	CONSTITUTIONAL - no fever, no diaphoresis, no chills  	•	SKIN - no rash  	•	HEMATOLOGIC - no bleeding, no bruising  	•	EYES - no eye pain, no blurry vision  	•	ENT - no change in hearing, no sore throat, no ear pain or tinnitus  	•	RESPIRATORY - no shortness of breath, no cough  	•	CARDIAC - no chest pain, no palpitations  	•	GI - + abd pain, no nausea, no vomiting, no diarrhea, no constipation  	•	GENITO-URINARY - no discharge, no dysuria; + hematuria, no increased urinary frequency  	•	MUSCULOSKELETAL - no joint paint, no swelling, no redness  	•	NEUROLOGIC - no weakness, no headache, no paresthesias, no LOC  	•	PSYCH - no anxiety, non suicidal, non homicidal, no hallucination, no depression

## 2021-08-19 NOTE — ED PROVIDER NOTE - NSFOLLOWUPCLINICS_GEN_ALL_ED_FT
A Family Medicine Doctor  Family Medicine  .  [place]   Phone:   Fax:   Follow Up Time: 1-3 Days    [facility]  Urology  .  [place]   Phone: [phone]  Fax:   Follow Up Time: 1-3 Days

## 2021-10-02 NOTE — ED PROVIDER NOTE - OBJECTIVE STATEMENT
71 yold male to ED Pmhx Htn, Hld, Dm, Alcoholic cirrhosis, Esrd on HD(tu,th,sat), Hx gi bleed, had colonoscopy 2 weeks ago + avm cauterized;   Pt presents with weakness, fatigue, lightheadedness and exertional dyspnea; pt had labs done post hemo and found to be anemic; pt with multiple transfusion in past;  gets procrit and iron at HD; followed by DR. Branham for Hematology and DR. Amin for dialysis;  pt denies chest pain, n/v/ abodminal pain, fever, chills, cough

## 2021-10-02 NOTE — ED PROVIDER NOTE - NS ED MD DISPO OBSERVATION SRV
Pt doing well - denies complaint. Previous h/a now resolved w/ ibuprofen.   Hemostatic sutures removed w/o incident.   Ambulate at 1500.   Anticipate d/c home at 1700. OBS

## 2021-10-02 NOTE — ED PROVIDER NOTE - PHYSICAL EXAMINATION
Constitutional: elderly male chronically ill appearing but no acute distress  Head: Normocephalic, atraumatic.  Eyes: PERRL, EOMI. + jaundice  ENT: No nasal discharge. Mucous membranes dry.  Neck: Supple. Painless ROM.  Cardiovascular: Regular rate and rhythm.  Pulmonary: + bibasalar rales.  Abdominal: Soft. Nondistended; + ascites  Extremities. Pelvis stable. + bilat lower ext edema 3+;   Skin: No rashes, cyanosis.  Neuro: AAOx3. No focal neurological deficits.  Psych: Normal mood. Normal affect.

## 2021-10-02 NOTE — ED PROVIDER NOTE - IV ALTEPLASE EXCL REL HIDDEN
Vaccines:     Shingles vaccine is given in TWO separate injections.   CDC recommends that healthy adults 50 years and older get two doses of the shingles vaccine called Shingrix (recombinant zoster vaccine),  by 2 to 6 months, to prevent shingles and the complications from the disease.      show

## 2021-10-02 NOTE — ED PROVIDER NOTE - NS ED ROS FT
Constitutional:  weakness, fatigue, lightheadedness;  Eyes: No visual changes.  ENT: No hearing changes.  Neck: No neck pain or stiffness.  Cardiovascular: No chest pain, palpitations, edema.  Pulmonary: see hpi  Abdominal:  No nausea, vomiting, diarrhea.  : No dysuria, frequency.  Neuro: No headache, syncope, dizziness.  MS: No back pain. No calf pain/swelling.  Psych: No suicidal ideations.

## 2021-10-02 NOTE — ED PROVIDER NOTE - ATTENDING CONTRIBUTION TO CARE
71 yold male to ED Pmhx Htn, Hld, Dm, Alcoholic cirrhosis, Esrd on HD(tu,th,sat), Hx gi bleed, had colonoscopy 2 weeks ago + avm cauterized, frequent visits to ER for transfusion  on thursdays HD labs, pt was found to be anemic and was told to [facility] ER. no signs of active bleeding- black/bloody stools. pt states he makes some urine.  no fevers/chills/cp. pt w/ some baseline exertional dyspnea which is baseline. pt believes he needs transfusion and can go      vss  gen- NAD, aaox3  card-rrr  lungs-ctab, no wheezing or rhonchi  abd-sntnd, no guarding or rebound  neuro- full str/sensation, cn ii-xii grossly intact, normal coordination   LE- b/l LE edema (baseline per pt)    will get labs, likely need transfusion, lasix, possible obs pending results

## 2021-10-03 NOTE — ED ADULT NURSE REASSESSMENT NOTE - NS ED NURSE REASSESS COMMENT FT1
Pt is alert and orientedx4, denies pains. Started hs 2nd unit of PRBC at 6;29am. No distress noted. Pt was given IV push lasix in between the 1 and 2nd blood in between stable.

## 2021-10-03 NOTE — ED CDU PROVIDER DISPOSITION NOTE - CLINICAL COURSE
72 y/o male with a PMH Htn, Hld, Dm, Alcoholic cirrhosis, Esrd on HD(tu,th,sat followed by nephrologist Dr. Amin), Hx gi bleed, had colonoscopy 2 weeks ago + avm cauterized, hx of anemia with multiple blood transfusions followed by Dr. Weber  presents to the ED with weakness, fatigue, lightheadedness and exertional dyspnea; pt had labs done and found to be anemic. pt last blood transfusion was 09/2021. pt had hbg 6.5 in the ED yesterday. pt given 2 units of blood as requesting by pt daughter during ER visit. pt given lasix prior to blood transfusion and in between the two units. pt reports he is feeling well and denies sob. pt next dialysis session is this tuesday. I discussed strict return precautions with pt and pt daughter. pt daughter reports pt will need transportation back home to address in pt chart.

## 2021-10-03 NOTE — ED CDU PROVIDER INITIAL DAY NOTE - ATTENDING CONTRIBUTION TO CARE
Pt has a history of ESRD. Presents with anemia requiring transfusion. Recently had a GI work up and found to have a bleeding lesion that was treated. No complaints this am. He has gotten two units of blood. Tolerated well. NO shortness of breath. On exam no jvd. S1S2 rrr, lungs occas crackle at bases. ext bilateral edema.

## 2021-10-03 NOTE — ED CDU PROVIDER INITIAL DAY NOTE - NS ED ROS FT
· Review of Systems: Constitutional:  weakness, fatigue, lightheadedness;  	Eyes: No visual changes.  	ENT: No hearing changes.  	Neck: No neck pain or stiffness.  	Cardiovascular: No chest pain, palpitations, edema.  	Pulmonary: see hpi  	Abdominal:  No nausea, vomiting, diarrhea.  	: No dysuria, frequency.  	Neuro: No headache, syncope, dizziness.  	MS: No back pain. No calf pain/swelling.  Psych: No suicidal ideations.

## 2021-10-03 NOTE — ED CDU PROVIDER DISPOSITION NOTE - CARE PROVIDER_API CALL
YOUR NEPHROLOGIST,   Dr. Amin  Phone: (   )    -  Fax: (   )    -  Follow Up Time:     YOUR HEMATOLOGIST,   Dr. Weber  Phone: (   )    -  Fax: (   )    -  Follow Up Time:

## 2021-10-03 NOTE — ED CDU PROVIDER INITIAL DAY NOTE - PHYSICAL EXAMINATION
· Physical Examination: Constitutional: elderly male chronically ill appearing but no acute distress  Head: Normocephalic, atraumatic.  Eyes: PERRL, EOMI. + jaundice  ENT: No nasal discharge. Mucous membranes dry.  Neck: Supple. Painless ROM.  Cardiovascular: Regular rate and rhythm.  Pulmonary: + bibasalar rales.  Abdominal: Soft. Nondistended; + ascites  Extremities. Pelvis stable. + bilat lower ext edema 3+;   Skin: No rashes, cyanosis.  Neuro: AAOx3. No focal neurological deficits.  Psych: Normal mood. Normal affect.

## 2021-10-03 NOTE — ED CDU PROVIDER DISPOSITION NOTE - PROVIDER TOKENS
FREE:[LAST:[YOUR NEPHROLOGIST],PHONE:[(   )    -],FAX:[(   )    -],ADDRESS:[Dr. Amin]],FREE:[LAST:[YOUR HEMATOLOGIST],PHONE:[(   )    -],FAX:[(   )    -],ADDRESS:[Dr. Weber]]

## 2021-10-03 NOTE — ED CDU PROVIDER DISPOSITION NOTE - NSFOLLOWUPINSTRUCTIONS_ED_ALL_ED_FT
Anemia    Anemia is a condition in which the concentration of red blood cells or hemoglobin in the blood is below normal. Hemoglobin is a substance in red blood cells that carries oxygen to the tissues of the body. Anemia results in not enough oxygen reaching these tissues which can cause symptoms such as weakness, dizziness/lightheadedness, shortness of breath, chest pain, paleness, or nausea.    SEEK IMMEDIATE MEDICAL CARE IF YOU HAVE THE FOLLOWING SYMPTOMS: extreme weakness/chest pain/shortness of breath, black or bloody stools, vomiting blood, fainting, fever, or any signs of dehydration.    Weakness    WHAT YOU NEED TO KNOW:    Weakness is a loss of muscle strength. It may be caused by brain, nerve, or muscle problems. Physical and mental conditions such as heart problems, pregnancy, dehydration, or depression may also cause weakness. Reactions to certain drugs can cause weakness. Parts of your body may become weak if you need to wear a cast or splint or have been on bed rest for a long time.    DISCHARGE INSTRUCTIONS:    Call 911 for any of the following:     You have any of the following signs of a stroke:   Numbness or drooping on one side of your face       Weakness in an arm or leg      Confusion or difficulty speaking      Dizziness, a severe headache, or vision loss      You lose feeling in your weakened body area.      You have electric shock-like feelings down your arms and legs when you flex or move your neck.      You have sudden or increased trouble speaking, swallowing, or breathing.    Return to the emergency department if:     You have severe pain in your back, arms, or legs that worsens.      You have sudden or worsened muscle weakness or loss of movement.      You are not able to control when you urinate or have a bowel movement.    Contact your healthcare provider if:     You feel depressed or anxious.       You have questions or concerns about your condition or care.     Manage weakness:     Use assistive devices as directed. These help protect you from injury. Examples include a walker or cane. Have someone install handrails in your home. These will help you get out of a bathtub or stand up from a toilet. Use a shower chair so you can sit while you shower. Sit down on the toilet or another chair to dry off and put on your clothes. Get help going up and down stairs if your legs are weak.       Go to physical or occupational therapy if directed. A physical therapist can teach you exercises to help strengthen weak muscles. An occupational therapist can show you ways to do your daily activities more easily. For example, light forks and spoons can be easier to use if you have hand weakness. You may also learn ways to organize your household items so you are not moving heavy items.      Balance rest with exercise. Exercise can help increase your muscle strength and energy. Do not exercise for long periods at a time. Take breaks often to rest. Too much exercise can cause muscle strain or make you more tired. Ask your healthcare provider how much exercise is right for you.      Eat a variety of healthy foods. Too much or too little food may cause weakness or tiredness. Ask your healthcare provider what a healthy amount of food is for you. Healthy foods include fruits, vegetables, whole-grain breads, low-fat dairy products, lean meats and fish, nuts, and cooked beans.      Do not smoke. Nicotine and other chemicals in cigarettes and cigars can make your symptoms worse, and can cause lung damage. Ask your healthcare provider for information if you currently smoke and need help to quit. E-cigarettes or smokeless tobacco still contain nicotine. Talk to your healthcare provider before you use these products.       Do not use caffeine, alcohol, or illegal drugs. These may cause muscle twitching, which could lead to worsened weakness.     Follow up with your healthcare provider as directed: Write down your questions so you remember to ask them during your visits.       © Copyright [employer] 2019 All illustrations and images included in CareNotes are the copyrighted property of [employer], [employer] or [employer].     Shortness of Breath, Adult  ImageShortness of breath is when a person has trouble breathing enough air, or when a person feels like she or he is having trouble breathing in enough air. Shortness of breath could be a sign of medical problem.    Follow these instructions at home:  Pay attention to any changes in your symptoms. Take these actions to help with your condition:    Do not smoke. Smoking is a common cause of shortness of breath. If you smoke and you need help quitting, ask your health care provider.  Avoid things that can irritate your airways, such as:    Mold.  Dust.  Air pollution.  Chemical fumes.  Things that can cause allergy symptoms (allergens), if you have allergies.    Keep your living space clean and free of mold and dust.  Rest as needed. Slowly return to your usual activities.  Take over-the-counter and prescription medicines, including oxygen and inhaled medicines, only as told by your health care provider.  Keep all follow-up visits as told by your health care provider. This is important.    Contact a health care provider if:  Your condition does not improve as soon as expected.  You have a hard time doing your normal activities, even after you rest.  You have new symptoms.  Get help right away if:  Your shortness of breath gets worse.  You have shortness of breath when you are resting.  You feel light-headed or you faint.  You have a cough that is not controlled with medicines.  You cough up blood.  You have pain with breathing.  You have pain in your chest, arms, shoulders, or abdomen.  You have a fever.  You cannot walk up stairs or exercise the way that you normally do.  This information is not intended to replace advice given to you by your health care provider. Make sure you discuss any questions you have with your health care provider.

## 2021-10-03 NOTE — ED CDU PROVIDER INITIAL DAY NOTE - PROGRESS NOTE DETAILS
pt placed in obs for transufion of blood; multiple tx in past - pt given lasix before and between prbs; consented; pt to f/u with HD in 2 days(tues,th,sat) FF: pt finished two units of prbc with lasix prior too and in between blood transfusion. pt reports he is feeling well. pt denies sob. I spoke with pt daughter who reports pt would need transportation to return home today and next dialysis session is this tuesday. I discussed strict return precautions with pt and pt daughter. agreeable to dc.

## 2021-10-03 NOTE — ED CDU PROVIDER DISPOSITION NOTE - PATIENT PORTAL LINK FT
You can access the FollowMyHealth Patient Portal offered by [facility] by registering at the following website: [email or web address]. By joining [facility]’s FollowMyHealth portal, you will also be able to view your health information using other applications (apps) compatible with our system.

## 2021-10-03 NOTE — ED ADULT NURSE REASSESSMENT NOTE - COMFORT CARE
darkened lights/meal provided/plan of care explained/side rails up/wait time explained/warm blanket provided

## 2021-11-03 NOTE — ED PROVIDER NOTE - CARE PLAN
1 Principal Discharge DX:	Acute respiratory failure with hypoxia  Secondary Diagnosis:	Pulmonary edema  Secondary Diagnosis:	Fluid overload  Secondary Diagnosis:	ESRD on dialysis  Secondary Diagnosis:	Elevated troponin  Secondary Diagnosis:	Elevated lactic acid level  Secondary Diagnosis:	Hypercapnia

## 2021-11-03 NOTE — PROCEDURE NOTE - NSICDXPROCEDURE_GEN_ALL_CORE_FT
PROCEDURES:  Ultrasound guidance for placement of central venous catheter (CVC) 03-Nov-2021 14:13:34  Geoffrey Garcia

## 2021-11-03 NOTE — ED PROVIDER NOTE - CLINICAL SUMMARY MEDICAL DECISION MAKING FREE TEXT BOX
Patient presented with worsening dyspnea, (+) grossly fluid overloaded on exam. On arrival, hypoxic and tachypneic on RA which improved with bipap. Obtained EKG which was non-specific without evidence of STEMI. CXR with (+) b/l pulmonary edema. Labs remarkable for respiratory acidosis as well as elevated troponin. Patient remained stable on bipap. Consulted nephrology who will dialyze patient and will admit to ICU SDU for further management. HD stable at time of admission.

## 2021-11-03 NOTE — ED ADULT NURSE REASSESSMENT NOTE - NS ED NURSE REASSESS COMMENT FT1
patient was intubated in HD patient on cardiac monitor, vitals stable. will continue to assess and monitor

## 2021-11-03 NOTE — CONSULT NOTE ADULT - SUBJECTIVE AND OBJECTIVE BOX
Patient is a 71y old  Male who presents with a chief complaint of     HPI: 72 yo M PMHx of COPD on 3L NC, BIPAP at night, HTN, HLD, DM II, Alcoholic Cirrhosis, ESRD on HD on (T/Th/Sat), history of GIB secondary to AVM s/p cauterization presented with complaint of SOB since morning of presentation, as per daughter patient has had nasal congestion and productive cough since yesterday.    PAST MEDICAL & SURGICAL HISTORY:  HTN (hypertension)    Diabetes mellitus    ESRD (end stage renal disease) on dialysis    H/O rheumatic heart disease    MARLI (obstructive sleep apnea)    Anemia    GIB (gastrointestinal bleeding)    H/O aortic valve replacement    H/O mitral valve replacement    H/O cardiac pacemaker    AV fistula  right upper arm        SOCIAL HX:   Smoking    exsmoker quit 2019, smoked cigars and one pack daily for 45 years                  ETOH      denies                      Other denies  Lives with wife    FAMILY HISTORY:  No pertinent family history in first degree relatives    :  No known cardiovascular family history     Review Of Systems:     All ROS are negative except per HPI       Allergies    No Known Allergies    Intolerances          PHYSICAL EXAM    ICU Vital Signs Last 24 Hrs  T(C): 36.1 (03 Nov 2021 07:39), Max: 36.1 (03 Nov 2021 07:17)  T(F): 97 (03 Nov 2021 07:39), Max: 97 (03 Nov 2021 07:39)  HR: 65 (03 Nov 2021 10:26) (63 - 98)  BP: 106/55 (03 Nov 2021 10:26) (95/53 - 127/64)  BP(mean): --  ABP: --  ABP(mean): --  RR: 23 (03 Nov 2021 10:26) (20 - 36)  SpO2: 93% (03 Nov 2021 10:26) (80% - 100%)      CONSTITUTIONAL:  Ill appearing  NAD    ENT:   Airway patent,   Mouth with normal mucosa.   No thrush      CARDIAC:   Normal rate,   Regular rhythm.    No edema      Vascular:   normal systolic impulse  no bruits    RESPIRATORY:   Decreased bilateral breath sounds  bilateral crackles  No wheezing  Bilateral BS   Not tachypneic,  No use of accessory muscles    GASTROINTESTINAL:  increased abdominal girth  Abdomen soft,   Non-tender,   No guarding,   + BS      NEUROLOGICAL:   Alert and oriented   No motor deficits.    SKIN:   Skin normal color for race,   No evidence of rash.      HEME LYMPH: .  No cervical  lymphadenopathy.  No inguinal lymphadenopathy              LABS:                          10.5   3.72  )-----------( 80       ( 03 Nov 2021 07:46 )             35.5                                               11-03    142  |  96<L>  |  54<H>  ----------------------------<  76  4.2   |  24  |  4.7<HH>    Ca    8.5      03 Nov 2021 07:46    TPro  6.5  /  Alb  3.8  /  TBili  3.1<H>  /  DBili  x   /  AST  30  /  ALT  29  /  AlkPhos  228<H>  11-03                                                 CARDIAC MARKERS ( 03 Nov 2021 07:46 )  x     / 0.17 ng/mL / x     / x     / x                                                LIVER FUNCTIONS - ( 03 Nov 2021 07:46 )  Alb: 3.8 g/dL / Pro: 6.5 g/dL / ALK PHOS: 228 U/L / ALT: 29 U/L / AST: 30 U/L / GGT: x                                                                                                                                       X-Rays reviewed  pulmonary edema, bilateral congestion        Patient is a 71y old  Male who presents with a chief complaint of     HPI: 70 yo M PMHx of COPD on 3L NC, MARLI on BIPAP, HTN, HLD, DM II, Alcoholic Cirrhosis, ESRD on HD on (T/Th/Sat), history of GIB secondary to AVM s/p cauterization presented with complaint of SOB since morning of presentation, as per daughter patient has had nasal congestion and productive cough since yesterday. Patient last dialyzed yesterday.     PAST MEDICAL & SURGICAL HISTORY:  HTN (hypertension)    Diabetes mellitus    ESRD (end stage renal disease) on dialysis    H/O rheumatic heart disease    MARLI (obstructive sleep apnea)    Anemia    GIB (gastrointestinal bleeding)    H/O aortic valve replacement    H/O mitral valve replacement    H/O cardiac pacemaker    AV fistula  right upper arm        SOCIAL HX:   Smoking    exsmoker quit 2019, smoked cigars and one pack daily for 45 years                  ETOH      denies                      Other denies  Lives with wife    FAMILY HISTORY:  No pertinent family history in first degree relatives    :  No known cardiovascular family history     Review Of Systems:     All ROS are negative except per HPI       Allergies    No Known Allergies    Intolerances          PHYSICAL EXAM    ICU Vital Signs Last 24 Hrs  T(C): 36.1 (03 Nov 2021 07:39), Max: 36.1 (03 Nov 2021 07:17)  T(F): 97 (03 Nov 2021 07:39), Max: 97 (03 Nov 2021 07:39)  HR: 65 (03 Nov 2021 10:26) (63 - 98)  BP: 106/55 (03 Nov 2021 10:26) (95/53 - 127/64)  BP(mean): --  ABP: --  ABP(mean): --  RR: 23 (03 Nov 2021 10:26) (20 - 36)  SpO2: 93% (03 Nov 2021 10:26) (80% - 100%)      CONSTITUTIONAL:  Ill appearing  NAD    ENT:   Airway patent,   Mouth with normal mucosa.   No thrush      CARDIAC:   Normal rate,   Regular rhythm.    No edema      Vascular:   normal systolic impulse  no bruits    RESPIRATORY:   Decreased bilateral breath sounds  bilateral crackles  No wheezing  Bilateral BS   Not tachypneic,  No use of accessory muscles    GASTROINTESTINAL:  increased abdominal girth  Abdomen soft,   Non-tender,   No guarding,   + BS      NEUROLOGICAL:   Alert and oriented   No motor deficits.    SKIN:   Skin normal color for race,   No evidence of rash.      HEME LYMPH: .  No cervical  lymphadenopathy.  No inguinal lymphadenopathy              LABS:                          10.5   3.72  )-----------( 80       ( 03 Nov 2021 07:46 )             35.5                                               11-03    142  |  96<L>  |  54<H>  ----------------------------<  76  4.2   |  24  |  4.7<HH>    Ca    8.5      03 Nov 2021 07:46    TPro  6.5  /  Alb  3.8  /  TBili  3.1<H>  /  DBili  x   /  AST  30  /  ALT  29  /  AlkPhos  228<H>  11-03                                                 CARDIAC MARKERS ( 03 Nov 2021 07:46 )  x     / 0.17 ng/mL / x     / x     / x                                                LIVER FUNCTIONS - ( 03 Nov 2021 07:46 )  Alb: 3.8 g/dL / Pro: 6.5 g/dL / ALK PHOS: 228 U/L / ALT: 29 U/L / AST: 30 U/L / GGT: x                                                                                                                                       X-Rays reviewed  pulmonary edema, bilateral congestion        Patient is a 71y old  Male who presents with a chief complaint of     HPI: 70 yo M PMHx of COPD on 3L NC, MARLI on BIPAP, HTN, HLD, DM II, Alcoholic Cirrhosis, ESRD on HD on (T/Th/Sat), history of GIB secondary to AVM s/p cauterization presented with complaint of SOB since morning of presentation, as per daughter patient has had nasal congestion and productive cough since yesterday. Patient last dialyzed yesterday. Patient became tachypnea with labored breathing with use of accessory muscles while in HD.     PAST MEDICAL & SURGICAL HISTORY:  HTN (hypertension)    Diabetes mellitus    ESRD (end stage renal disease) on dialysis    H/O rheumatic heart disease    MARLI (obstructive sleep apnea)    Anemia    GIB (gastrointestinal bleeding)    H/O aortic valve replacement    H/O mitral valve replacement    H/O cardiac pacemaker    AV fistula  right upper arm        SOCIAL HX:   Smoking    exsmoker quit 2019, smoked cigars and one pack daily for 45 years                  ETOH      denies                      Other denies  Lives with wife    FAMILY HISTORY:  No pertinent family history in first degree relatives    :  No known cardiovascular family history     Review Of Systems:     All ROS are negative except per HPI       Allergies    No Known Allergies    Intolerances          PHYSICAL EXAM    ICU Vital Signs Last 24 Hrs  T(C): 36.1 (03 Nov 2021 07:39), Max: 36.1 (03 Nov 2021 07:17)  T(F): 97 (03 Nov 2021 07:39), Max: 97 (03 Nov 2021 07:39)  HR: 65 (03 Nov 2021 10:26) (63 - 98)  BP: 106/55 (03 Nov 2021 10:26) (95/53 - 127/64)  BP(mean): --  ABP: --  ABP(mean): --  RR: 23 (03 Nov 2021 10:26) (20 - 36)  SpO2: 93% (03 Nov 2021 10:26) (80% - 100%)      CONSTITUTIONAL:  Ill appearing  NAD    ENT:   Airway patent,   Mouth with normal mucosa.   No thrush      CARDIAC:   Normal rate,   Regular rhythm.    No edema      Vascular:   normal systolic impulse  no bruits    RESPIRATORY:   Decreased bilateral breath sounds  bilateral crackles  No wheezing  Bilateral BS   Not tachypneic,  No use of accessory muscles    GASTROINTESTINAL:  increased abdominal girth  Abdomen soft,   Non-tender,   No guarding,   + BS      NEUROLOGICAL:   Alert   Smnolent  No motor deficits.    SKIN:   Skin normal color for race,   No evidence of rash.      HEME LYMPH: .  No cervical  lymphadenopathy.  No inguinal lymphadenopathy              LABS:                          10.5   3.72  )-----------( 80       ( 03 Nov 2021 07:46 )             35.5                                               11-03    142  |  96<L>  |  54<H>  ----------------------------<  76  4.2   |  24  |  4.7<HH>    Ca    8.5      03 Nov 2021 07:46    TPro  6.5  /  Alb  3.8  /  TBili  3.1<H>  /  DBili  x   /  AST  30  /  ALT  29  /  AlkPhos  228<H>  11-03                                                 CARDIAC MARKERS ( 03 Nov 2021 07:46 )  x     / 0.17 ng/mL / x     / x     / x                                                LIVER FUNCTIONS - ( 03 Nov 2021 07:46 )  Alb: 3.8 g/dL / Pro: 6.5 g/dL / ALK PHOS: 228 U/L / ALT: 29 U/L / AST: 30 U/L / GGT: x                                                                                                                                       X-Rays reviewed  pulmonary edema, bilateral congestion

## 2021-11-03 NOTE — ED PROVIDER NOTE - PHYSICAL EXAMINATION
Physical Exam    Vital Signs: I have reviewed the initial vital signs.  Constitutional: ill appearing, in acute distress, tachypneic   Eyes: Conjunctiva pink, Sclera clear  Cardiovascular: S1 and S2, tachycardic, regular rhythm, well-perfused extremities, radial pulses equal and 2+, pedal pulses 2+ and equal   Respiratory: labored respiratory effort, diffuse rales throughout   Gastrointestinal: soft, non-tender abdomen, no pulsatile mass, normal bowl sounds  Musculoskeletal: supple neck, no lower extremity edema, no midline tenderness  Integumentary: warm, dry, no rash  Neurologic: awake, alert, nvi

## 2021-11-03 NOTE — H&P ADULT - NSHPPHYSICALEXAM_GEN_ALL_CORE
PHYSICAL EXAM:  GENERAL: NAD, intubated and sedated   HEAD:  Atraumatic, Normocephalic  EYES: EOMI, PERRLA, conjunctiva and sclera clear  ENT: Moist mucous membranes  NECK: Supple, No JVD  CHEST/LUNG: Clear to auscultation bilaterally; No rales, rhonchi, wheezing, or rubs. Unlabored respirations  HEART: Regular rate and rhythm; No murmurs, rubs, or gallops  ABDOMEN: Bowel sounds present; Soft, Nontender, Nondistended. No hepatomegaly  EXTREMITIES:  2+ Peripheral Pulses, brisk capillary refill. No clubbing, cyanosis, or edema  NERVOUS SYSTEM:  no involuntary movements    MSK: FROM all 4 extremities, full and equal strength  SKIN: No rashes or lesions

## 2021-11-03 NOTE — ED PROVIDER NOTE - CRITICAL CARE ATTENDING CONTRIBUTION TO CARE
71 year old male, pmhx as documented presenting with dyspnea that began upon awakening this AM associated with productive cough. Patient hypoxic in the field per EMS report. Patient is very limited historian and history therefore unable to be further obtained at this time.     Vital Signs: I have reviewed the initial vital signs.  Constitutional: Well-nourished, appears stated age, (+) moderate respiratory distress  HEENT: Airway patent, moist MM, no erythema/swelling/deformity of oral structures. EOMI, PERRLA.  CV: regular rate, regular rhythm, well-perfused extremities, 2+ b/l DP and radial pulses equal.  Lungs: (+) rales b/l, (+) increased WOB  ABD: NTND, no guarding or rebound, no pulsatile mass, no hernias.   MSK: Neck supple, nontender, nl ROM, no stepoff. Chest nontender. Back nontender in TLS spine or to b/l bony structures or flanks. Ext nontender, nl rom, no deformity.   INTEG: Skin warm, dry, no rash.  NEURO: A&Ox3, normal strength, nl sensation throughout, normal speech.   PSYCH: Calm, cooperative, normal affect and interaction.    Likely pulmonary edema from fluid overload 2/2 kidney disease. Patient placed on bipap with improvement. Will obtain EKG, CXR, labs, re-eval.

## 2021-11-03 NOTE — CONSULT NOTE ADULT - SUBJECTIVE AND OBJECTIVE BOX
[facility] INITIAL CONSULT NOTE  --------------------------------------------------------------------------------  HPI:  72 YO male with PMH as below including ESRD on HD TTS at [facility] under the care of Dr Amin. Last HD yesterday with 3L removed. Presents today with SOB, MERCADO, LE edema and orthopnea. SOB improved with bipap. CXR consistent with pulmonary edema.    PAST HISTORY  --------------------------------------------------------------------------------  PAST MEDICAL & SURGICAL HISTORY:  HTN (hypertension)  Diabetes mellitus  ESRD (end stage renal disease) on dialysis  H/O rheumatic heart disease  MARLI (obstructive sleep apnea)  Anemia  GIB (gastrointestinal bleeding)  H/O aortic valve replacement  H/O mitral valve replacement  H/O cardiac pacemaker  AV fistula right upper arm    FAMILY HISTORY:  No pertinent family history in first degree relatives    SOCIAL HISTORY:  No current ETOH, tobacco, or illicit drug use    ALLERGIES & MEDICATIONS  --------------------------------------------------------------------------------  Allergies    No Known Allergies    Medications reviewed    PRN Inpatient Medications      REVIEW OF SYSTEMS  --------------------------------------------------------------------------------  Gen: No fevers/chills  Skin: No rashes  Head/Eyes/Ears/Mouth: No headache;  No sinus pain/discomfort, sore throat  Respiratory: No cough, wheezing, hemoptysis  CV: No chest pain  GI: No abdominal pain, diarrhea, constipation, nausea, vomiting, melena, hematochezia  : No increased frequency, dysuria, hematuria, nocturia  All other systems were reviewed and are negative, except as noted.    VITALS/PHYSICAL EXAM  --------------------------------------------------------------------------------  T(C): 36.1 (11-03-21 @ 07:39), Max: 36.1 (11-03-21 @ 07:17)  HR: 65 (11-03-21 @ 12:25) (63 - 98)  BP: 84/46 (11-03-21 @ 12:25) (84/46 - 127/64)  RR: 16 (11-03-21 @ 12:25) (16 - 36)  SpO2: 93% (11-03-21 @ 12:25) (80% - 100%)  Height (cm): 182.9 (11-03-21 @ 07:17)    Physical Exam:  	Gen: NAD on bipap  	Pulm:  B/L rales  	CV: RRR, S1S2  	Back: No CVA tenderness; no sacral edema  	Abd: +BS, soft, nontender/nondistended  	: No suprapubic tenderness  	LE: Warm,  edema  	Neuro: AAO x3  	Vascular access: AVF    LABS/STUDIES  --------------------------------------------------------------------------------              10.5   3.72  >-----------<  80       [11-03-21 @ 07:46]              35.5     142  |  96  |  54  ----------------------------<  76      [11-03-21 @ 07:46]  4.2   |  24  |  4.7        Ca     8.5     [11-03-21 @ 07:46]    TPro  6.5  /  Alb  3.8  /  TBili  3.1  /  DBili  x   /  AST  30  /  ALT  29  /  AlkPhos  228  [11-03-21 @ 07:46]    Troponin 0.17      [11-03-21 @ 07:46]    Creatinine Trend:  SCr 4.7 [11-03 @ 07:46]    Urinalysis - [08-19-21 @ 00:00]      Color Red / Appearance Turbid / SG 1.020 / pH 6.0      Gluc Negative / Ketone Negative  / Bili Negative / Urobili <2 mg/dL       Blood Large / Protein Negative / Leuk Est Moderate / Nitrite Negative      RBC >720 / WBC 10 / Hyaline  / Gran  / Sq Epi  / Non Sq Epi  / Bacteria     Iron 58, TIBC 274, %sat 21      [02-13-21 @ 16:55]  Ferritin 615      [02-13-21 @ 16:55]    HBsAg Nonreact      [12-22-19 @ 07:13]  HCV 0.24, Nonreact      [12-22-19 @ 07:13]    BETZAIDA: titer 1:80, pattern Speckled      [12-22-19 @ 07:13]

## 2021-11-03 NOTE — H&P ADULT - HISTORY OF PRESENT ILLNESS
Chief Complaint: Intubated upon my exam, initially presented with SOB    Past Medical History: ESRD on HD, COPD on 3LNC at home, MARLI on BiPAP, T2DM, Hepatic cirrhosis, HTN, HLD,     Mr. Pierson is a 71M with a past medical history as described above who presented to the hospital for evaluation of shortness of breath. Patients daughter reports that he has had sinus congestion and cold symptoms for a few days prior to presentation. Patient was taken to HD while in the ED as he was volume overloaded and dyspneic. During his HD session he began to become more tachypneic and blood pressure began to drop. Decision was made to intubated the patient. A central line was also placed for pressor support. He was upgraded to ICU for management of Acute Hypoxic Respiratory Failure with possible sepsis.

## 2021-11-03 NOTE — CONSULT NOTE ADULT - ATTENDING COMMENTS
IMPRESSION:    Acute on Chronic Hypoxemic/Hypercapnic Respiratory Failure  Pulmonary Edema  Sepsis POA  Probable Superimposed CAP  COPD Exacerbation  HO ESRD on HD  HO HFrEF SP AICD  HO AVR, Moderate MS and Abnormal AS of Bioprosthetic   HO Alcoholic Cirrhosis     Plan as outlined above

## 2021-11-03 NOTE — CONSULT NOTE ADULT - ASSESSMENT
ESRD on HD   - via RUE AVF  anemia  dyspnea  fluid overload  borderline low BP  afib not on AC due to GIB  HFrEF / rheumatic heart disease s/p AVR and MVR  DM  MARLI on home O2 and bipap  cirrhosis   splenomegaly / ascites  ex-etoh abuse    plan:     HD today via AVF - 3kg off goal as bp tolerates  epogen 10,000U IV QHD  lasix 80mg iv q12h while inpatient  right arm precautions   low K+ renal diet / fluid restriction  phoslo with meals

## 2021-11-03 NOTE — ED PROVIDER NOTE - PROGRESS NOTE DETAILS
pt improving on bipap, vss, will rpt vbg. s/w dr mckeon icu fellow, approved sdu, pt will go to hd now, s/w dr moreno.

## 2021-11-03 NOTE — H&P ADULT - NSHPLABSRESULTS_GEN_ALL_CORE
(11-03 @ 07:46)                      10.5  3.72 )-----------( 80                 35.5    Neutrophils = 3.34 (89.8%)  Lymphocytes = 0.18 (4.8%)  Eosinophils = 0.08 (2.2%)  Basophils = 0.01 (0.3%)  Monocytes = 0.09 (2.4%)  Bands = --%    11-03    142  |  96<L>  |  54<H>  ----------------------------<  76  4.2   |  24  |  4.7<HH>    Ca    8.5      03 Nov 2021 07:46    TPro  6.5  /  Alb  3.8  /  TBili  3.1<H>  /  DBili  x   /  AST  30  /  ALT  29  /  AlkPhos  228<H>  11-03      CARDIAC MARKERS ( 03 Nov 2021 07:46 )  Trop 0.17 ng/mL<HH> / CK x     / CKMB x           RVP:(11-03 @ 07:46)  [facility]      Venous Blood Gas:  11-03 @ 09:10  7.19/81/40/31/53.4  VBG Lactate: 3.70  Venous Blood Gas:  11-03 @ 07:31  7.13/94/52/31/70.7  VBG Lactate: 4.60        Tox:

## 2021-11-03 NOTE — CONSULT NOTE ADULT - ASSESSMENT
IMPRESSION:    Acute on Chronic Hypercapnic Respiratory Failure  COPD Exacerbation  HO Bioprosthetic AVR, Moderate        PLAN:    CNS:    HEENT: Oral care    PULMONARY:  HOB @ 45 degrees.  Aspiration precautions     CARDIOVASCULAR:    GI: GI prophylaxis.  Feeding.  Bowel regimen     RENAL:  Follow up lytes.  Correct as needed    INFECTIOUS DISEASE: Follow up cultures    HEMATOLOGICAL:  DVT prophylaxis.    ENDOCRINE:  Follow up FS.  Insulin protocol if needed.    MUSCULOSKELETAL:         IMPRESSION:    Acute on Chronic Hypercapnic Respiratory Failure  COPD Exacerbation  HO ESRD on HD  HO HFrEF SP AICD  HO AVR, Moderate MS and Abnormal AS of Bioprosthetic   HO Alcoholic Cirrhosis       PLAN:    CNS: avoid depressants    HEENT: Oral care    PULMONARY:  HOB @ 45 degrees.  Aspiration precautions. low threshold for intubation. BIPAP     CARDIOVASCULAR: Keep negative balance, trend CE, Cardiology evaluation     GI: GI prophylaxis.  NPO for now. Bowel regimen. US Abdomen, check ammonia    RENAL:  Follow up lytes.  Correct as needed. Nephrology for urgent HD today    INFECTIOUS DISEASE: Follow up cultures, check procalcitonin. Ceftriaxone and Azithromycin      HEMATOLOGICAL:  DVT prophylaxis.    ENDOCRINE:  Follow up FS.  Insulin protocol if needed.    MUSCULOSKELETAL: bedrest    SDU monitoring       IMPRESSION:    Acute on Chronic Hypoxemic/Hypercapnic Respiratory Failure  Sepsis POA  COPD Exacerbation  Probable CAP  HO ESRD on HD  HO HFrEF SP AICD  HO AVR, Moderate MS and Abnormal AS of Bioprosthetic   HO Alcoholic Cirrhosis       PLAN:    CNS: avoid depressants    HEENT: Oral care    PULMONARY:  HOB @ 45 degrees.  Aspiration precautions. low threshold for intubation. BIPAP     CARDIOVASCULAR: Keep negative balance, trend CE, Cardiology evaluation     GI: GI prophylaxis.  NPO for now. Bowel regimen. US Abdomen, check ammonia    RENAL:  Follow up lytes.  Correct as needed. Nephrology for urgent HD today    INFECTIOUS DISEASE: Follow up cultures, check procalcitonin. Ceftriaxone and Azithromycin      HEMATOLOGICAL:  DVT prophylaxis.    ENDOCRINE:  Follow up FS.  Insulin protocol if needed.    MUSCULOSKELETAL: bedrest    SDU monitoring       IMPRESSION:    Acute on Chronic Hypoxemic/Hypercapnic Respiratory Failure  Pulmonary Edema  Sepsis POA  Probable CAP  COPD Exacerbation  HO ESRD on HD  HO HFrEF SP AICD  HO AVR, Moderate MS and Abnormal AS of Bioprosthetic   HO Alcoholic Cirrhosis       PLAN:    CNS: avoid depressants    HEENT: Oral care    PULMONARY:  HOB @ 45 degrees.  Aspiration precautions. Vent settings per ABG. CXR    CARDIOVASCULAR: Keep negative balance, trend CE, Cardiology evaluation     GI: GI prophylaxis.  NPO for now. Bowel regimen. US Abdomen, check ammonia    RENAL:  Follow up lytes.  Correct as needed. Nephrology for HD    INFECTIOUS DISEASE: Follow up cultures, check procalcitonin. Ceftriaxone and Azithromycin , nasal MRSA    HEMATOLOGICAL:  DVT prophylaxis.    ENDOCRINE:  Follow up FS.  Insulin protocol if needed.    MUSCULOSKELETAL: bedrest    MICU monitoring      IMPRESSION:    Acute on Chronic Hypoxemic/Hypercapnic Respiratory Failure  Pulmonary Edema  Sepsis POA  Probable Superimposed CAP  COPD Exacerbation  HO ESRD on HD  HO HFrEF SP AICD  HO AVR, Moderate MS and Abnormal AS of Bioprosthetic   HO Alcoholic Cirrhosis       PLAN:    CNS: avoid depressants    HEENT: Oral care    PULMONARY:  HOB @ 45 degrees.  Aspiration precautions. Vent settings per ABG. CXR    CARDIOVASCULAR: Keep negative balance, trend CE, Cardiology evaluation     GI: GI prophylaxis.  NPO for now. Bowel regimen. US Abdomen, check ammonia    RENAL:  Follow up lytes.  Correct as needed. Nephrology for HD    INFECTIOUS DISEASE: Follow up cultures, check procalcitonin. Ceftriaxone and Azithromycin , nasal MRSA    HEMATOLOGICAL:  DVT prophylaxis.    ENDOCRINE:  Follow up FS.  Insulin protocol if needed.    MUSCULOSKELETAL: bedrest, LE duplex    MICU monitoring      IMPRESSION:    Acute on Chronic Hypoxemic/Hypercapnic Respiratory Failure  Pulmonary Edema  Sepsis POA  Probable Superimposed CAP  COPD Exacerbation  HO ESRD on HD  HO HFrEF SP AICD  HO AVR, Moderate MS and Abnormal AS of Bioprosthetic   HO Alcoholic Cirrhosis       PLAN:    CNS: Will need sedation after intubation and MV     HEENT: Oral care    PULMONARY:  HOB @ 45 degrees.  Aspiration precautions. Vent settings per ABG. CXR    CARDIOVASCULAR: Keep negative balance, trend CE, Cardiology evaluation     GI: GI prophylaxis.  OG Feeding post intubation.  Bowel regimen. US Abdomen, check ammonia    RENAL:  Follow up lytes.  Correct as needed. Nephrology for HD    INFECTIOUS DISEASE: Follow up cultures, check procalcitonin. Ceftriaxone and Azithromycin, nasal MRSA.  DTA     HEMATOLOGICAL:  DVT prophylaxis.  DImer     ENDOCRINE:  Follow up FS.  Insulin protocol if needed.    MUSCULOSKELETAL: bedrest, LE duplex    MICU monitoring

## 2021-11-03 NOTE — CHART NOTE - NSCHARTNOTEFT_GEN_A_CORE
Pulm/CC fellow Dr. Braun spoke with daughter Lucrecia regarding consent for intubation and invasive procedures for emergent intubation. Daughter agreeable with plan, pt in HD became tachypneic with use of access muscles. Respiratory and CRNA called and intubated patient in the HD unit. Upgraded to ICU, agreeable with plan.

## 2021-11-03 NOTE — ED ADULT TRIAGE NOTE - CHIEF COMPLAINT QUOTE
BIBA from home, c/o difficulty breathing since last night, (+) cold sx since yesterday. Pt fell out of bed this morning, no head injury. SP02 room air per EMS = 60-80%. SpO2 w/ NRM = 96%. Pt w/ ESRD, HD done yesterday.

## 2021-11-03 NOTE — ED PROVIDER NOTE - SECONDARY DIAGNOSIS.
Pulmonary edema Fluid overload ESRD on dialysis Elevated troponin Elevated lactic acid level Hypercapnia

## 2021-11-03 NOTE — H&P ADULT - ASSESSMENT
Impression   Acute Hypoxic Respiratory Failure due to ESRD volume overload   COPD exacerbation   Sepsis pneumonia?     Plan     CNS: Avoid CNS depressants. Fentanyl and Precedex for sedation.     CARDIOVASCULAR: Levo, keep MAP 65. Avoid hypotension. EKG in AM.      PULMONARY: HOB >45 degrees. Keep SpO2 >95% Vent settings per pulmonary CXR in am.      INFECTIOUS DISEASE: IV and catheter care PRN. Rocephin and azithro. Follow cultures. procal.     GI: Prophylaxis with Protonix 40mg IV daily     RENAL: I = O. Replace electrolytes if needed. Nuñez. HD per renal      ENDOCRINE: FS at bedtime and before meals. Insulin protocol if needed.     HEMATOLOGY: DVT ppx with Heparin 5000 units SubQ every 8 hours. VA duplex.     FULL CODE  Prognosis guarded

## 2021-11-04 NOTE — PROGRESS NOTE ADULT - ASSESSMENT
ESRD on HD   - via RUE AVF  anemia  dyspnea  R/O PNA/sepsis  afib not on AC due to GIB  HFrEF / rheumatic heart disease s/p AVR and MVR  DM  MARLI on home O2 and bipap  cirrhosis   splenomegaly / ascites  ex-etoh abuse    plan:     HD ongoing via AVF - 3kg off goal as bp tolerates ( on pressor)  epogen 10,000U IV QHD  right arm precautions   IV Abxs per ICU/ID  on vent and pressor  poor prognosis       d/w ER and HD nurse

## 2021-11-04 NOTE — PROGRESS NOTE ADULT - ASSESSMENT
IMPRESSION:    Acute on Chronic Hypercapnic Respiratory Failure with hypoxemia   Pulmonary Edema  Sepsis POA  Probable Superimposed CAP  HO ESRD on HD  HO HFrEF SP AICD  HO AVR, Moderate MS and Abnormal AS of Bioprosthetic   HO Alcoholic Cirrhosis       PLAN:    CNS: SAT and continue sedation      HEENT: Oral care    PULMONARY:  HOB @ 45 degrees.  Aspiration precautions. Vent setting:  RR 24.  PEEP 10.  Wean o2.  DTA     CARDIOVASCULAR: Keep negative balance, trend CE.      GI: GI prophylaxis.  OG Feeding post intubation.  Bowel regimen.  check ammonia    RENAL:  Follow up lytes.  Correct as needed. Nephrology for HD today     INFECTIOUS DISEASE: Follow up cultures, check procalcitonin. Ceftriaxone and Azithromycin, Nasal MRSA.  DTA.  One dose Vanc     HEMATOLOGICAL:  DVT prophylaxis.  DImer     ENDOCRINE:  Follow up FS.  Insulin protocol if needed.    MUSCULOSKELETAL: bedrest.    Prognosis guarded     MICU monitoring

## 2021-11-04 NOTE — ED ADULT NURSE REASSESSMENT NOTE - NS ED NURSE REASSESS COMMENT FT1
MD at bedside with family, health care proxy decided to withdraw medications and proceed with comfort care. MD and RN signed molst form. pt made DNR.

## 2021-11-05 LAB
COVID-19 NUCLEOCAPSID GAM AB INTERP: POSITIVE
COVID-19 NUCLEOCAPSID TOTAL GAM ANTIBODY RESULT: 160 INDEX — HIGH
COVID-19 SPIKE DOMAIN AB INTERP: POSITIVE
COVID-19 SPIKE DOMAIN ANTIBODY RESULT: >250 U/ML — HIGH
SARS-COV-2 IGG+IGM SERPL QL IA: 160 INDEX — HIGH
SARS-COV-2 IGG+IGM SERPL QL IA: >250 U/ML — HIGH
SARS-COV-2 IGG+IGM SERPL QL IA: POSITIVE
SARS-COV-2 IGG+IGM SERPL QL IA: POSITIVE

## 2021-11-12 DIAGNOSIS — J96.01 ACUTE RESPIRATORY FAILURE WITH HYPOXIA: ICD-10-CM

## 2021-11-12 DIAGNOSIS — D64.9 ANEMIA, UNSPECIFIED: ICD-10-CM

## 2021-11-12 DIAGNOSIS — Z99.81 DEPENDENCE ON SUPPLEMENTAL OXYGEN: ICD-10-CM

## 2021-11-12 DIAGNOSIS — Z95.2 PRESENCE OF PROSTHETIC HEART VALVE: ICD-10-CM

## 2021-11-12 DIAGNOSIS — J44.1 CHRONIC OBSTRUCTIVE PULMONARY DISEASE WITH (ACUTE) EXACERBATION: ICD-10-CM

## 2021-11-12 DIAGNOSIS — I13.2 HYPERTENSIVE HEART AND CHRONIC KIDNEY DISEASE WITH HEART FAILURE AND WITH STAGE 5 CHRONIC KIDNEY DISEASE, OR END STAGE RENAL DISEASE: ICD-10-CM

## 2021-11-12 DIAGNOSIS — A41.9 SEPSIS, UNSPECIFIED ORGANISM: ICD-10-CM

## 2021-11-12 DIAGNOSIS — E11.22 TYPE 2 DIABETES MELLITUS WITH DIABETIC CHRONIC KIDNEY DISEASE: ICD-10-CM

## 2021-11-12 DIAGNOSIS — J18.9 PNEUMONIA, UNSPECIFIED ORGANISM: ICD-10-CM

## 2021-11-12 DIAGNOSIS — R06.02 SHORTNESS OF BREATH: ICD-10-CM

## 2021-11-12 DIAGNOSIS — Z79.899 OTHER LONG TERM (CURRENT) DRUG THERAPY: ICD-10-CM

## 2021-11-12 DIAGNOSIS — Z99.2 DEPENDENCE ON RENAL DIALYSIS: ICD-10-CM

## 2021-11-12 DIAGNOSIS — E78.5 HYPERLIPIDEMIA, UNSPECIFIED: ICD-10-CM

## 2021-11-12 DIAGNOSIS — I48.91 UNSPECIFIED ATRIAL FIBRILLATION: ICD-10-CM

## 2021-11-12 DIAGNOSIS — N18.6 END STAGE RENAL DISEASE: ICD-10-CM

## 2021-11-12 DIAGNOSIS — E87.2 ACIDOSIS: ICD-10-CM

## 2021-11-12 DIAGNOSIS — K70.31 ALCOHOLIC CIRRHOSIS OF LIVER WITH ASCITES: ICD-10-CM

## 2021-11-12 DIAGNOSIS — Z95.810 PRESENCE OF AUTOMATIC (IMPLANTABLE) CARDIAC DEFIBRILLATOR: ICD-10-CM

## 2021-11-12 DIAGNOSIS — E87.79 OTHER FLUID OVERLOAD: ICD-10-CM

## 2021-11-12 DIAGNOSIS — I50.22 CHRONIC SYSTOLIC (CONGESTIVE) HEART FAILURE: ICD-10-CM

## 2021-11-12 DIAGNOSIS — G47.33 OBSTRUCTIVE SLEEP APNEA (ADULT) (PEDIATRIC): ICD-10-CM

## 2022-04-13 NOTE — ED ADULT TRIAGE NOTE - HEART RATE (BEATS/MIN)
Assessment/Plan:   Diagnoses and all orders for this visit:    Encounter to establish care  Eating disorder, unspecified type  -     Ambulatory Referral to Psychiatry; Future  -     Ambulatory Referral to [facility]rap[facility]; Future  -     TSH, 3rd generation with Free T4 reflex  -     Comprehensive metabolic panel; Future  -     CBC and differential; Future  -     Iron Panel (Includes Ferritin, Iron Sat%, Iron, and TIBC); Future  -     Vitamin B12; Future  -     Vitamin D 25 hydroxy; Future  -     Comprehensive metabolic panel  -     CBC and differential  -     Vitamin B12  -     Vitamin D 25 hydroxy  - (+) irregular menses   - told Mom 2days ago that she has an eating disorder   - exercising for hours, drinking Energy drinks to stay awake, difficulty concentrating   - would not eat or if would eat a nml meal - would throw-up right afterwards   - was restless when went to get pizza with Dad and older brother a few days ago - forced herself to throw-up after eating   - UTD with Dental - no comments re: enamel erosion 2/2 purging   - PHQ-A score of 9  - denies SI/HI  - amenable to talking with Psych and Therapist - referrals given   - reached out to Jonathon Grijalva for assistance in finding local [facility] in 1month, with labs, for close f/u - pt and Mom aware and agreeable     Family history of hyperlipidemia  -     Lipid panel; Future  -     Lipid panel  - (+) FHx of HL in Father           Subjective:    Patient ID: Yessica Lazcano is a 13 y o  female    Yessica Lazcano is a 13 y o  female who presents to the office with Mom and Older Sister to establish care/annual exam and concerns   1) Establish Care/Annual   - prior PCP: [facility], last OV was 2019   - PMHx: none  - allergies: NKDA  - Meds: none   - PSHx: none    - FHx: F (HL), M (a/w), MGM (MM), denies FHx of breast/cervical/colon ca   - Immunizations: UTD with COVID primary series, due for HPV #2/2 and will get in the office today - GYN Hx: (+) irregular menses  - diet/exercise: see below   - social: denies tob/EtOH/illicits, is in 9th grade at [employer]   - sexual Hx: not active   - last vision: wears glasses/contacts - goes annually   - last dental: Dr Tracey Kiran - goes Q6months  2) Eating Disorder  - (+) irregular menses - FDLMP: earlier this week, but over the summer didn't have a period for a few months   - told Mom 2days ago   - exercising for hours, drinking Energy drinks to stay awake, difficulty concentrating   - would not eat or if would eat a nml meal - would throw-up right afterwards   - was restless when went to get pizza with Dad and older brother a few days ago - forced herself to throw-up after eating   - UTD with Dental - no comments re: enamel erosion 2/2 purging   - PHQ-A score of 9  - denies SI/HI      The following portions of the patient's history were reviewed and updated as appropriate: allergies, current medications, past family history, past medical history, past social history, past surgical history and problem list     Review of Systems  as per HPI    Objective:  BP (!) 122/80   Pulse 62   Ht 5' 2 5" (1 588 m)   Wt 58 5 kg (129 lb)   SpO2 100%   BMI 23 22 kg/m²    Physical Exam  Vitals reviewed  Constitutional:       General: She is not in acute distress  Appearance: Normal appearance  She is not ill-appearing, toxic-appearing or diaphoretic  HENT:      Head: Normocephalic and atraumatic  Right Ear: External ear normal       Left Ear: External ear normal       Nose: Nose normal    Eyes:      General: No scleral icterus  Right eye: No discharge  Left eye: No discharge  Extraocular Movements: Extraocular movements intact  Conjunctiva/sclera: Conjunctivae normal    Cardiovascular:      Rate and Rhythm: Normal rate and regular rhythm  Heart sounds: Normal heart sounds  No murmur heard  No friction rub  No gallop      Pulmonary:      Effort: Pulmonary effort is normal  No respiratory distress  Breath sounds: Normal breath sounds  No stridor  No wheezing, rhonchi or rales  Abdominal:      Palpations: Abdomen is soft  Musculoskeletal:         General: Normal range of motion  Cervical back: Normal range of motion and neck supple  Right lower leg: No edema  Left lower leg: No edema  Skin:     General: Skin is warm  Neurological:      General: No focal deficit present  Mental Status: She is alert and oriented to person, place, and time  Psychiatric:         Attention and Perception: Attention normal          Mood and Affect: Affect normal  Mood is depressed  Speech: Speech normal  She is communicative  Behavior: Behavior normal  Behavior is cooperative  Thought Content: Thought content normal  Thought content does not include homicidal or suicidal ideation  Thought content does not include homicidal or suicidal plan  Cognition and Memory: Cognition normal       Comments: PHQ-A score of 9, denies SI/HI         Nutrition and Exercise Counseling: The patient's Body mass index is 23 22 kg/m²  This is 83 %ile (Z= 0 97) based on CDC (Girls, 2-20 Years) BMI-for-age based on BMI available as of 4/13/2022    Nutrition counseling provided:  Anticipatory guidance for nutrition given and counseled on healthy eating habits  Exercise counseling provided:  Anticipatory guidance and counseling on exercise and physical activity given      Depression screen performed:  Patient screened- Positive Referred to mental health and Discussed with family/patient 63

## 2022-10-18 NOTE — H&P ADULT - ASSESSMENT
Chief Complaint:  Patient is a 82y old  Female who presents with a chief complaint of Hepatic Mass (18 Oct 2022 08:59)      HPI/ 24 hr events: Patient seen and examined at bedside. Pt feeling well this morning. Tolerating diet. Reports no BM for 3 days. Denies nausea, vomiting, abdominal pain, hematemesis, hematochezia, melena. Episode of tachycardia this morning, no leukocytosis, CBC stable, LFTs stable. MRI abdomen is pending at this time.     [facility] #[id number]      REVIEW OF SYSTEMS:   General: Negative  HEENT: Negative  CV: Negative  Respiratory: Negative  GI: See HPI  : Negative  MSK: Negative  Hematologic: Negative  Skin: Negative    MEDICATIONS:   MEDICATIONS  (STANDING):  atorvastatin 40 milliGRAM(s) Oral at bedtime  budesonide 160 MICROgram(s)/formoterol 4.5 MICROgram(s) Inhaler 2 Puff(s) Inhalation two times a day  carvedilol 12.5 milliGRAM(s) Oral every 12 hours  dextrose 5%. 1000 milliLiter(s) (100 mL/Hr) IV Continuous <Continuous>  dextrose 5%. 1000 milliLiter(s) (50 mL/Hr) IV Continuous <Continuous>  dextrose 50% Injectable 25 Gram(s) IV Push once  dextrose 50% Injectable 12.5 Gram(s) IV Push once  dextrose 50% Injectable 25 Gram(s) IV Push once  glucagon  Injectable 1 milliGRAM(s) IntraMuscular once  hydrALAZINE 50 milliGRAM(s) Oral two times a day  insulin glargine Injectable (LANTUS) 5 Unit(s) SubCutaneous at bedtime  insulin lispro (ADMELOG) corrective regimen sliding scale   SubCutaneous three times a day before meals  insulin lispro (ADMELOG) corrective regimen sliding scale   SubCutaneous at bedtime  levothyroxine 25 MICROGram(s) Oral daily  NIFEdipine XL 60 milliGRAM(s) Oral daily  pantoprazole    Tablet 40 milliGRAM(s) Oral before breakfast  senna 2 Tablet(s) Oral at bedtime    MEDICATIONS  (PRN):  dextrose Oral Gel 15 Gram(s) Oral once PRN Blood Glucose LESS THAN 70 milliGRAM(s)/deciliter  labetalol Injectable 10 milliGRAM(s) IV Push every 6 hours PRN Systolic blood pressure > 180      ALLERGIES:   Allergies    amlodipine (Swelling)  aspirin (Rash)    Intolerances        VITAL SIGNS:   Vital Signs Last 24 Hrs  T(C): 36.6 (18 Oct 2022 05:03), Max: 36.6 (17 Oct 2022 16:32)  T(F): 97.9 (18 Oct 2022 05:03), Max: 97.9 (18 Oct 2022 05:03)  HR: 107 (18 Oct 2022 05:03) (84 - 107)  BP: 130/53 (18 Oct 2022 05:03) (130/53 - 149/66)  BP(mean): --  RR: 18 (18 Oct 2022 05:03) (18 - 19)  SpO2: 96% (18 Oct 2022 05:03) (96% - 96%)    Parameters below as of 18 Oct 2022 05:03  Patient On (Oxygen Delivery Method): room air      I&O's Summary      PHYSICAL EXAM:   GENERAL:  No acute distress  HEENT:  NC/AT,  conjunctiva clear, sclera anicteric  CHEST:  No increased effort, breath sounds clear  HEART:  Regular rhythm, S1, S2,   ABDOMEN:  Soft, non-tender, non-distended, normoactive bowel sounds,  no rebound or guarding  EXTREMITIES: No edema  SKIN:  Warm, dry  NEURO:  Calm, cooperative    LABS:  CBC Full  -  ( 18 Oct 2022 05:36 )  WBC Count : 7.13 K/uL  RBC Count : 3.32 M/uL  Hemoglobin : 8.3 g/dL  Hematocrit : 27.1 %  Platelet Count - Automated : 176 K/uL  Mean Cell Volume : 81.6 fl  Mean Cell Hemoglobin : 25.0 pg  Mean Cell Hemoglobin Concentration : 30.6 gm/dL  Auto Neutrophil # : 3.26 K/uL  Auto Lymphocyte # : 3.00 K/uL  Auto Monocyte # : 0.78 K/uL  Auto Eosinophil # : 0.07 K/uL  Auto Basophil # : 0.01 K/uL  Auto Neutrophil % : 45.8 %  Auto Lymphocyte % : 42.1 %  Auto Monocyte % : 10.9 %  Auto Eosinophil % : 1.0 %  Auto Basophil % : 0.1 %    10-18    137  |  102  |  15.4  ----------------------------<  194<H>  4.5   |  22.0  |  0.98    Ca    9.4      18 Oct 2022 05:36  Phos  3.4     10-18  Mg     1.7     10-18    TPro  7.0  /  Alb  3.4  /  TBili  0.3<L>  /  DBili  0.1  /  AST  21  /  ALT  15  /  AlkPhos  116  10-18    LIVER FUNCTIONS - ( 18 Oct 2022 05:36 )  Alb: 3.4 g/dL / Pro: 7.0 g/dL / ALK PHOS: 116 U/L / ALT: 15 U/L / AST: 21 U/L / GGT: x           PT/INR - ( 17 Oct 2022 05:31 )   PT: 15.1 sec;   INR: 1.30 ratio         PTT - ( 17 Oct 2022 05:31 )  PTT:26.9 sec    Hepatitis A IgG Ab Result: Reactive (10.16.22 @ 11:25)    Hepatitis B Core Antibody, Total: Reactive: Test repeated. (10.16.22 @ 11:25)    Alpha Fetoprotein - Tumor Marker (10.16.22 @ 11:25)    Alpha Fetoprotein - Tumor Marker: 186.0: Method: Roche Electrochemiluminescence Immuno Assay  Values obtained with different assay methods or kits cannot be  used interchangeably.  Results cannot be interpreted as absolute evidence of the presence  or absence of malignant disease.  AFP values are not interpretable in pregnant females. ng/mL    Carcinoembryonic Antigen (10.16.22 @ 16:26)    Carcinoembryonic Antigen: 4.2: Method: Roche Electrochemiluminescence Immuno Assay  Values obtained with different assay methods or kits cannot be  used interchangeably.  Results cannot be interpreted as absolute evidence of the presence  or absence of malignant disease.   CEA Normal Ranges   _________________   Non-smoker: less than 3.9 ng/mL       Smoker: less than 5.5 ng/mL ng/mL                        RADIOLOGY & ADDITIONAL STUDIES:    < from: CT Angio Abdomen and Pelvis w/ IV Cont (10.16.22 @ 01:14) >    ACC: [id number] EXAM:  CT ANGIO ABD PELV (W)AW IC                          PROCEDURE DATE:  10/16/2022          INTERPRETATION:  CLINICAL INFORMATION: Abdominal pain. Evaluate for   mesenteric ischemia.    COMPARISON: None.    CONTRAST/COMPLICATIONS:  IV Contrast: Omnipaque 350  95 cc administered   5 cc discarded  Oral Contrast: NONE  Complications: None reported at time of study completion    PROCEDURE:  CT Angiography of the Abdomen and Pelvis was performed.  Arterial and venous phases were acquired.  Sagittal and coronal reformats were performed as well as 3D (MIP)   reconstructions.    FINDINGS:  LOWER CHEST: Heart is borderline in size. Coronary calcifications are   noted..    LIVER: Nodularity of the hepatic contour with preferential enlargement of   the left hepatic lobe is consistent with cirrhosis. There is a 2.9 cm   hypervascular lesion of segment 2 of the left hepatic lobe, with lungs   and subsequent portal venous phase imaging. This is likely a flash   filling hemangioma. An additional subcentimeter hypervascular focus more   anteriorly within the left hepatic lobe is noted and is indeterminate   nature. A third hypervascular lesion is seen within the right hepatic   lobe measuring 3 cm. This demonstrates decreased attenuation on venous   phase imaging suggestive of washout. This is a finding associated with   hepatocellular carcinoma. There is diffuse fatty replacement of liver.  BILE DUCTS: Normal caliber.  GALLBLADDER: Within normal limits.  SPLEEN: Within normallimits.  PANCREAS: Within normal limits.  ADRENALS: Within normal limits.  KIDNEYS/URETERS: Within normal limits.    BLADDER: Within normal limits.  REPRODUCTIVE ORGANS:    BOWEL: There is no bowel obstruction.. Appendix is normal. There is   colonicdiverticulosis. There is no focal diverticulitis. A large amount   of stool seen throughout the colon. There is a small hiatal hernia.  PERITONEUM: No ascites.  VESSELS: Atherosclerotic ossifications of the aorta and iliac arteries   are appreciated.Atherosclerotic calcifications are seen at the origins   of the celiac axis and SMA. This causes 50-70% stenosis of the celiac   axis and SMA. The BC is patent. There is no venous occlusive disease.  RETROPERITONEUM/LYMPH NODES: No lymphadenopathy.  ABDOMINAL WALL: Within normal limits.  BONES: Degenerative changes.      IMPRESSION:  Hypervascular lesions seen in liver, at least one of which is typical for   a flash filling hemangioma. A lesion within the right hepatic lobe   demonstrates washout, suspicious for hepatocellular carcinoma. Additional   subcentimeter hypervascular lesion of left hepatic lobe is indeterminate.   Confirmation with multiphasic hepatic protocol MRI or CT is advised for   more definitive evaluation.    Cirrhosis.    Atherosclerotic changes of the arterial structures, with 50-70% stenosis   of the origins of the celiac axis and SMA. No mesenteric venous occlusive   disease.    Constipation.          --- End of Report ---            MONI ENG MD; Attending Radiologist  This document has been electronically signed. Oct 16 2022  1:38AM    < end of copied text >       Patient is a 70 yo Faroese speaking male with PMH of Hypertension, ESRD on HD T/Th/S, Atrial fibrillation not on AC due to GI bleed, DM, MARLI on O2 3l and BiPAP at night, rheumatic heart disease s/p Aortic and Mitral valve replacement present with chief complaint of fever and generalized weakness for 2 days. Platelet 127, bilirubin 7, alkaline phosphatase 714, AST 83, , troponin 0.14, BNP 49402. CT abdomen showed Slightly nodular hepatic contour with associated splenomegaly and small volume abdominal pelvic ascites.     ASSESSMENT AND PLAN:    #Cholelithiasis  -Abdominal pain, currently resolved, bilirubin 7, alkaline phosphatase 714  -US abdomen showed cholelithiasis with normal CBD   -Normal WBC count, no fevers  -Trend LFTs  -Consult surgery for possible cholecystectomy    # Cirrhosis of liver  -Labs showed Platelet 127, bilirubin 7, alkaline phosphatase 714, AST 83,   -CT abdomen showed Slightly nodular hepatic contour. Given the associated splenomegaly and small volume abdominal pelvic ascites, cirrhosis and portal hypertension is favored  -Patient was never diagnosed with cirrhosis   -MELD score 33  -EGD in 2015 for GI bleed showed no varices  -No ascites, change in mental status  -GI consult placed    #DM  -Monitor FS and start on insulin if FS >180    #ESRD on HD   -Started on HD in 2015  -Started on T/Th/S  -Skipped HD on thursday  -Nephro following    #MARLI  -on 3l NC and BiPAP overnight    #Diet: DASH/TLC/Carb consistent/Renal diet  #DVT ppx: heparin sq  #GI ppx: protonix  #Dispo: Acute

## 2023-01-01 NOTE — DISCHARGE NOTE PROVIDER - NSDCMRMEDTOKEN_GEN_ALL_CORE_FT
calcium acetate 667 mg oral capsule: 1 cap(s) orally 3 times a day (with meals)  cefpodoxime 200 mg oral tablet: 1 tab(s) orally once a day to be given after dialysis,   finasteride 5 mg oral tablet: 1 tab(s) orally once a day  pantoprazole 40 mg oral delayed release tablet: 1 tab(s) orally once a day
Statement Selected

## 2023-01-30 NOTE — ED ADULT NURSE NOTE - NS ED NOTE  TALK SOMEONE YN
Eucrisa Pregnancy And Lactation Text: This medication has not been assigned a Pregnancy Risk Category but animal studies failed to show danger with the topical medication. It is unknown if the medication is excreted in breast milk. No

## 2023-02-16 NOTE — ED PROVIDER NOTE - NS ED ATTENDING STATEMENT MOD
Hypertension Medications Protocol Passed  Last refill 1/30/23 #90 0 refill   This request refused I have personally seen and examined this patient.  I have fully participated in the care of this patient. I have reviewed all pertinent clinical information, including history, physical exam, plan and the Resident’s note and agree except as noted.

## 2023-07-28 NOTE — CONSULT NOTE ADULT - CONSULT REQUESTED DATE/TIME
22-Dec-2019 00:00 Odomzo Counseling- I discussed with the patient the risks of Odomzo including but not limited to nausea, vomiting, diarrhea, constipation, weight loss, changes in the sense of taste, decreased appetite, muscle spasms, and hair loss.  The patient verbalized understanding of the proper use and possible adverse effects of Odomzo.  All of the patient's questions and concerns were addressed.

## 2023-09-22 NOTE — PROGRESS NOTE ADULT - SUBJECTIVE AND OBJECTIVE BOX
Patient was examined during HD treatment. Very tachypneic. D/W critical care team and family. Will need to be intubated.    Hemodialysis Prescription:  	Access: AVF  	Dialyzer: Opti 160  	Blood Flow (mL/Min): 400  	Dialysate Flow (mL/Min): 500  	Target UF (Liters): 2-3  	Treatment Time: 2 hours  	Potassium: 2K  	Calcium: 2.5  	  
 [facility] FOLLOW UP NOTE  --------------------------------------------------------------------------------  Chief Complaint/24 hour events/subjective:    pt seen in HD, d/w family and HD nurse    PAST HISTORY  --------------------------------------------------------------------------------  No significant changes to PMH, PSH, FHx, SHx, unless otherwise noted    ALLERGIES & MEDICATIONS  --------------------------------------------------------------------------------  Allergies    No Known Allergies    Intolerances      Standing Inpatient Medications  atorvastatin 20 milliGRAM(s) Oral at bedtime  azithromycin  IVPB 500 milliGRAM(s) IV Intermittent every 24 hours  calcium acetate 667 milliGRAM(s) Oral three times a day with meals  cefTRIAXone   IVPB 1000 milliGRAM(s) IV Intermittent every 24 hours  chlorhexidine 4% Liquid 1 Application(s) Topical <User Schedule>  dexMEDEtomidine Infusion 0.2 MICROgram(s)/kG/Hr IV Continuous <Continuous>  fentaNYL   Infusion. 0.5 MICROgram(s)/kG/Hr IV Continuous <Continuous>  finasteride 5 milliGRAM(s) Oral daily  heparin   Injectable 5000 Unit(s) SubCutaneous every 8 hours  norepinephrine Infusion 0.05 MICROgram(s)/kG/Min IV Continuous <Continuous>  pantoprazole  Injectable 40 milliGRAM(s) IV Push daily  vancomycin  IVPB 500 milliGRAM(s) IV Intermittent daily  vasopressin Infusion 0.04 Unit(s)/Min IV Continuous <Continuous>    PRN Inpatient Medications  acetaminophen     Tablet .. 650 milliGRAM(s) Oral every 6 hours PRN      REVIEW OF SYSTEMS  --------------------------------------------------------------------------------    All other systems were reviewed and are negative, except as noted.    VITALS/PHYSICAL EXAM  --------------------------------------------------------------------------------  T(C): 37.2 (11-04-21 @ 13:15), Max: 40.9 (11-04-21 @ 08:30)  HR: 72 (11-04-21 @ 13:15) (68 - 98)  BP: 119/53 (11-04-21 @ 06:00) (85/42 - 146/65)  RR: 28 (11-04-21 @ 13:15) (20 - 28)  SpO2: 94% (11-04-21 @ 13:15) (91% - 100%)  Wt(kg): --  Height (cm): 182.9 (11-03-21 @ 07:17)  Weight (kg): 125.5 (11-04-21 @ 07:30)  BMI (kg/m2): 37.5 (11-04-21 @ 07:30)  BSA (m2): 2.45 (11-04-21 @ 07:30)      11-03-21 @ 07:01  -  11-04-21 @ 07:00  --------------------------------------------------------  IN: 0 mL / OUT: 600 mL / NET: -600 mL    11-04-21 @ 07:01  -  11-04-21 @ 13:22  --------------------------------------------------------  IN: 150 mL / OUT: 2500 mL / NET: -2350 mL      Physical Exam:    	on vent  	Pulm: B/L low BS  	CV: S1S2; no rub  	Abd: +BS, soft, nontender/nondistended  	LE:   edema      LABS/STUDIES  --------------------------------------------------------------------------------              10.3   3.06  >-----------<  52       [11-04-21 @ 09:36]              34.6     141  |  100  |  29  ----------------------------<  80      [11-04-21 @ 12:22]  3.5   |  22  |  2.9        Ca     7.5     [11-04-21 @ 12:22]      Mg     1.9     [11-04-21 @ 12:22]    TPro  5.2  /  Alb  3.0  /  TBili  3.8  /  DBili  x   /  AST  116  /  ALT  52  /  AlkPhos  389  [11-04-21 @ 10:36]        Troponin 0.17      [11-03-21 @ 07:46]    Creatinine Trend:  SCr 2.9 [11-04 @ 12:22]  SCr 3.5 [11-04 @ 10:36]  SCr 4.7 [11-03 @ 07:46]        Iron 58, TIBC 274, %sat 21      [02-13-21 @ 16:55]  Ferritin 615      [02-13-21 @ 16:55]      
Patient is a 71y old  Male who presents with a chief complaint of Acute Hypoxic Respiratory Failure (03 Nov 2021 15:24)        Over Night Events:  Remains critically ill on MV.  Sedated.  On Levophed.         ROS:     All ROS are negative except HPI         PHYSICAL EXAM    ICU Vital Signs Last 24 Hrs  T(C): 36.1 (03 Nov 2021 07:39), Max: 36.1 (03 Nov 2021 07:17)  T(F): 97 (03 Nov 2021 07:39), Max: 97 (03 Nov 2021 07:39)  HR: 98 (04 Nov 2021 06:00) (63 - 98)  BP: 119/53 (04 Nov 2021 06:00) (84/46 - 146/65)  BP(mean): 70 (04 Nov 2021 06:00) (56 - 74)  ABP: 139/52 (03 Nov 2021 18:55) (139/52 - 139/52)  ABP(mean): --  RR: 28 (04 Nov 2021 06:00) (16 - 36)  SpO2: 99% (04 Nov 2021 06:00) (80% - 100%)      CONSTITUTIONAL:  Ill appearing In  NAD    ENT:   Airway patent,   Mouth with normal mucosa.   No thrush    EYES:   Pupils equal,   Round and reactive to light.    CARDIAC:   Normal rate,   Regular rhythm.    Edema      Vascular:  Normal systolic impulse  No Carotid bruits    RESPIRATORY:   No wheezing  Bilateral BS  Normal chest expansion  Not tachypneic,  No use of accessory muscles    GASTROINTESTINAL:  Abdomen soft,   Non-tender,   No guarding,   + BS    MUSCULOSKELETAL:   Range of motion is not limited,  No clubbing, cyanosis    NEUROLOGICAL:   Sedated  No motor  deficits.    SKIN:   Skin normal color for race,   Warm and dry  No evidence of rash.    PSYCHIATRIC:   Sedated  No apparent risk to self or others.    HEMATOLOGICAL:  No cervical  lymphadenopathy.  no inguinal lymphadenopathy      11-03-21 @ 07:01  -  11-04-21 @ 07:00  --------------------------------------------------------  IN:  Total IN: 0 mL    OUT:    Other (mL): 600 mL  Total OUT: 600 mL    Total NET: -600 mL          LABS:                            10.5   3.72  )-----------( 80       ( 03 Nov 2021 07:46 )             35.5                                               11-03             10.5   3.72  )-----------( 80       ( 11-03 @ 07:46 )             35.5           142  |  96<L>  |  54<H>  ----------------------------<  76  4.2   |  24  |  4.7<HH>    Ca    8.5      03 Nov 2021 07:46    TPro  6.5  /  Alb  3.8  /  TBili  3.1<H>  /  DBili  x   /  AST  30  /  ALT  29  /  AlkPhos  228<H>  11-03                                                 CARDIAC MARKERS ( 03 Nov 2021 07:46 )  x     / 0.17 ng/mL / x     / x     / x                                                LIVER FUNCTIONS - ( 03 Nov 2021 07:46 )  Alb: 3.8 g/dL / Pro: 6.5 g/dL / ALK PHOS: 228 U/L / ALT: 29 U/L / AST: 30 U/L / GGT: x                                                                                               Mode: AC/ CMV (Assist Control/ Continuous Mandatory Ventilation)  RR (machine): 28  TV (machine): 400  FiO2: 60  PEEP: 8  ITime: 0.9  MAP: 16  PIP: 29                                      ABG - ( 04 Nov 2021 05:58 )  pH, Arterial: 7.32  pH, Blood: x     /  pCO2: 52    /  pO2: 65    / HCO3: 27    / Base Excess: 0.1   /  SaO2: 90.0  PPL 26               MEDICATIONS  (STANDING):  atorvastatin 20 milliGRAM(s) Oral at bedtime  azithromycin  IVPB 500 milliGRAM(s) IV Intermittent every 24 hours  calcium acetate 667 milliGRAM(s) Oral three times a day with meals  cefTRIAXone   IVPB 1000 milliGRAM(s) IV Intermittent every 24 hours  chlorhexidine 4% Liquid 1 Application(s) Topical <User Schedule>  dexMEDEtomidine Infusion 0.2 MICROgram(s)/kG/Hr (5.65 mL/Hr) IV Continuous <Continuous>  fentaNYL   Infusion. 0.5 MICROgram(s)/kG/Hr (5.65 mL/Hr) IV Continuous <Continuous>  finasteride 5 milliGRAM(s) Oral daily  heparin   Injectable 5000 Unit(s) SubCutaneous every 8 hours  norepinephrine Infusion 0.05 MICROgram(s)/kG/Min (10.6 mL/Hr) IV Continuous <Continuous>  pantoprazole  Injectable 40 milliGRAM(s) IV Push daily    MEDICATIONS  (PRN):      New X-rays reviewed:                                                                                  ECHO    CXR interpreted by me:      
None

## 2024-01-12 NOTE — ED CDU PROVIDER DISPOSITION NOTE - NS_EDPROVIDERDISPOUSERTYPE_ED_A_ED
Patient discharged in stable condition per orders. Patient verbalized understanding of all discharge instructions. All belongings accounted for. Ambulatory for discharge with steady gait.   Attending Attestation (For Attendings USE Only)...

## 2024-05-10 NOTE — ED ADULT NURSE NOTE - NSICDXPASTMEDICALHX_GEN_ALL_CORE_FT
Teresa Mao  tolerated hydration well with no complications.      Teresa Mao is aware of future appt on 5-13-24 at 800    AVS declined.        
PAST MEDICAL HISTORY:  Anemia     Diabetes mellitus     ESRD (end stage renal disease) on dialysis     GIB (gastrointestinal bleeding)     H/O rheumatic heart disease     HTN (hypertension)     MARLI (obstructive sleep apnea)

## 2025-02-19 NOTE — ED ADULT NURSE NOTE - CHIEF COMPLAINT QUOTE
[facility] Progress Note      Patient Name: Wilner Menjivar  : 1986  MRN: [id number]  Primary Care Physician:  Roosevelt Thao MD  Date of admission: 2025    Subjective     Interval History:   Follow-up on ESRD  Upset this morning because he is not on dialysis yet.  Denies any nausea or vomiting.  No fever or chills.  Pain seems to be controlled    Review of Systems:   As noted above    Objective     Vitals:   Temp:  [98.1 °F (36.7 °C)-99 °F (37.2 °C)] 99 °F (37.2 °C)  Heart Rate:  [] 105  Resp:  [18] 18  BP: (116-138)/(67-84) 130/67  No intake or output data in the 24 hours ending 25 1041        Physical Exam:    General Appearance: Awake, chronically ill, no acute distress  Skin: warm and dry  HEENT: oral mucosa normal, nonicteric sclera, legally blind  Neck: supple, no JVD  Lungs: CTA, no wheezing, nonlabored on RA  Heart: Tachycardic, RR, no rub  Abdomen: soft, nontender, nondistended, BS +  : no palpable bladder  Extremities: no edema, cyanosis or clubbing; dressing on right foot  Neuro: normal speech and mental status   Vascular: L AVF patent, + thrill and bruit    Scheduled Meds:     amLODIPine, 5 mg, Oral, Q24H  aspirin, 81 mg, Oral, Daily  atorvastatin, 10 mg, Oral, Nightly  bumetanide, 2 mg, Oral, BID Diuretics  carvedilol, 25 mg, Oral, BID  cefTRIAXone, 2,000 mg, Intravenous, Daily  docusate sodium, 100 mg, Oral, BID  epoetin jamil/jamil-epbx, 10,000 Units, Subcutaneous, Once per day on   insulin glargine, 20 Units, Subcutaneous, Nightly  insulin lispro, 2-7 Units, Subcutaneous, 4x Daily AC & at Bedtime  insulin lispro, 7 Units, Subcutaneous, TID With Meals  losartan, 50 mg, Oral, Q24H  pantoprazole, 40 mg, Oral, BID AC  polyethylene glycol, 17 g, Oral, Daily  terazosin, 1 mg, Oral, Nightly  ticagrelor, 90 mg, Oral, Q12H  vancomycin, 1,250 mg, Intravenous, Once  Vancomycin Pharmacy Intermittent/Pulse Dosing, , Not Applicable,  Daily      IV Meds:   Pharmacy to dose vancomycin,         Results Reviewed:   I have personally reviewed the results from the time of this admission to 2/19/2025 10:41 EST     Results from last 7 days   Lab Units 02/19/25  0616 02/18/25 0348 02/17/25  0526 02/14/25  0333 02/13/25  0507   SODIUM mmol/L 130* 127* 131*   < > 130*   POTASSIUM mmol/L 4.2 4.3 3.9   < > 3.5   CHLORIDE mmol/L 94* 93* 92*   < > 94*   CO2 mmol/L 21.0* 23.0 20.3*   < > 24.0   BUN mg/dL 39* 31* 46*   < > 26*   CREATININE mg/dL 7.27* 5.65* 6.59*   < > 4.84*   CALCIUM mg/dL 8.9 9.3 9.3   < > 9.1   BILIRUBIN mg/dL  --   --   --   --  0.6   ALK PHOS U/L  --   --   --   --  131*   ALT (SGPT) U/L  --   --   --   --  19   AST (SGOT) U/L  --   --   --   --  25   GLUCOSE mg/dL 303* 326* 166*   < > 148*    < > = values in this interval not displayed.       Estimated Creatinine Clearance: 15.8 mL/min (A) (by C-G formula based on SCr of 7.27 mg/dL (H)).    Results from last 7 days   Lab Units 02/19/25  0616 02/18/25 0348 02/14/25  0333   PHOSPHORUS mg/dL 3.9 3.4 3.5             Results from last 7 days   Lab Units 02/19/25  0616 02/18/25  0348 02/17/25  0526 02/16/25  0457 02/15/25  0725   WBC 10*3/mm3 15.41* 15.91* 18.20* 14.97* 15.30*   HEMOGLOBIN g/dL 6.7* 8.5* 8.4* 8.9* 9.1*   PLATELETS 10*3/mm3 331 337 359 304 294             Assessment / Plan     ASSESSMENT:     End  Stage Renal Disease ( ESRD )  since 6/2024 followed by Dr. Wasserman on Hemodialysis on a Monday, Wednesday and Friday schedule via LUE AVF.    Anemia of CKD on Epogen protocol.  10,000 units subcu. iron studies are pending   History of hyperphosphatemia  Hypertension w/ CKD.  Continue home regimen. We will continue to follow closely  Coronary artery disease status postcardiac cath with stent placement to mid/distal OM2 , and proximal Cx. ( 9/24 ).  On medical management  Diabetes Mellitus type 2 w/ complications ( retinopathy , peripheral vascular disease  nephropathy ) .Continue  insulin regimen and Diabetic diet, as per primary team   Right diabetic foot/osteomyelitis follow-up podiatry and vascular surgery status post right fifth toe amputation  Anemia postoperative and anemia of CKD being transfused     PLAN:    Will continue dialysis on Monday Wednesday and Friday with plan for dialysis today  Being transfused 1 unit today.  Check iron panel  Labs in a.m.  Surveillance labs    Thank you for involving us in the care of Wilner Menjivar.  Please feel free to call with any questions.    Brenna Denis MD  02/19/25  10:41 [facility]    [facility]  [phone]    Please note that portions of this note were completed with a voice recognition program.   pt sent for hemoglobin of 6.3, hx of renal failure. having exertional sob as well,     [phone]. nava elizabeth (daughter)  [phone]             alexis (daughter)
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
## 2025-06-03 NOTE — ASU PATIENT PROFILE, ADULT - CHRONIC PAIN COMMENT, PROFILE
Goal Outcome Evaluation:      VSS, patient received ativan throughout shift for CIWA (see MAR), patient had 2 BM and good UOP, no acute events this shift, will continue to monitor.                                       0